# Patient Record
Sex: FEMALE | Race: BLACK OR AFRICAN AMERICAN | NOT HISPANIC OR LATINO | Employment: UNEMPLOYED | URBAN - METROPOLITAN AREA
[De-identification: names, ages, dates, MRNs, and addresses within clinical notes are randomized per-mention and may not be internally consistent; named-entity substitution may affect disease eponyms.]

---

## 2017-04-29 ENCOUNTER — HOSPITAL ENCOUNTER (EMERGENCY)
Facility: HOSPITAL | Age: 47
Discharge: HOME | End: 2017-04-29
Attending: EMERGENCY MEDICINE
Payer: MEDICAID

## 2017-04-29 VITALS
HEART RATE: 84 BPM | OXYGEN SATURATION: 97 % | DIASTOLIC BLOOD PRESSURE: 77 MMHG | RESPIRATION RATE: 18 BRPM | WEIGHT: 246 LBS | SYSTOLIC BLOOD PRESSURE: 133 MMHG | HEIGHT: 68 IN | TEMPERATURE: 99 F | BODY MASS INDEX: 37.28 KG/M2

## 2017-04-29 DIAGNOSIS — R03.0 ELEVATED BLOOD PRESSURE READING: ICD-10-CM

## 2017-04-29 DIAGNOSIS — L03.011 PARONYCHIA OF RIGHT RING FINGER: Primary | ICD-10-CM

## 2017-04-29 PROCEDURE — 99283 EMERGENCY DEPT VISIT LOW MDM: CPT | Mod: 25

## 2017-04-29 PROCEDURE — 10060 I&D ABSCESS SIMPLE/SINGLE: CPT

## 2017-04-29 PROCEDURE — 25000003 PHARM REV CODE 250: Performed by: EMERGENCY MEDICINE

## 2017-04-29 RX ORDER — HYDROCODONE BITARTRATE AND ACETAMINOPHEN 7.5; 325 MG/1; MG/1
1 TABLET ORAL EVERY 6 HOURS PRN
Qty: 11 TABLET | Refills: 0 | Status: ON HOLD | OUTPATIENT
Start: 2017-04-29 | End: 2018-09-05 | Stop reason: HOSPADM

## 2017-04-29 RX ORDER — CLINDAMYCIN HYDROCHLORIDE 150 MG/1
300 CAPSULE ORAL
Status: COMPLETED | OUTPATIENT
Start: 2017-04-29 | End: 2017-04-29

## 2017-04-29 RX ORDER — CLINDAMYCIN HYDROCHLORIDE 300 MG/1
300 CAPSULE ORAL EVERY 8 HOURS
Qty: 30 CAPSULE | Refills: 0 | Status: ON HOLD | OUTPATIENT
Start: 2017-04-29 | End: 2018-09-05 | Stop reason: HOSPADM

## 2017-04-29 RX ADMIN — CLINDAMYCIN HYDROCHLORIDE 300 MG: 150 CAPSULE ORAL at 11:04

## 2017-04-29 NOTE — ED AVS SNAPSHOT
[facility]  [address] [address] [address]               Marcie Geiger   2017 10:04 PM   ED    Description:  Female : 1970   Department:  [facility]           Your Care was Coordinated By:     Provider Role From To    Alivia Miller DO Attending Provider 17 [phone] --      Reason for Visit     Hand Pain           Diagnoses this Visit        Comments    Paronychia of right ring finger    -  Primary     Elevated blood pressure reading           ED Disposition     None           To Do List           Follow-up Information     Follow up with Rafael Santana MD. Schedule an appointment as soon as possible for a visit in 2 days.    Specialty:  Family Medicine    Why:  Warm soaks of the finger.  Keep elevated.  Return to emergency department for increasing swelling, drainage, pain, or other concerns.    Contact information:    [address] [facility] [address] [facility] [address]  [phone]          Please follow up.    Why:  Pelvic primary care physician for screening for high blood pressure.       These Medications        Disp Refills Start End    clindamycin (CLEOCIN) 300 MG capsule 30 capsule 0 2017     Take 1 capsule (300 mg total) by mouth every 8 (eight) hours. - Oral    hydrocodone-acetaminophen 7.5-325mg (NORCO) 7.5-325 mg per tablet 11 tablet 0 2017     Take 1 tablet by mouth every 6 (six) hours as needed for Pain. - Oral      [facility]     [facility] - 24/7 Assistance  Unless otherwise directed by your provider, please contact [facility], our nurse care line that is available for 24/7 assistance.     Registered nurses in the [facility] provide: appointment scheduling, clinical advisement, health education, and other advisory services.  Call: [phone] (toll free)               Medications           Message regarding Medications     Verify  "the changes and/or additions to your medication regime listed below are the same as discussed with your clinician today.  If any of these changes or additions are incorrect, please notify your healthcare provider.        START taking these NEW medications        Refills    clindamycin (CLEOCIN) 300 MG capsule 0    Sig: Take 1 capsule (300 mg total) by mouth every 8 (eight) hours.    Class: Print    Route: Oral    hydrocodone-acetaminophen 7.5-325mg (NORCO) 7.5-325 mg per tablet 0    Sig: Take 1 tablet by mouth every 6 (six) hours as needed for Pain.    Class: Print    Route: Oral      These medications were administered today        Dose Freq    clindamycin capsule 300 mg 300 mg ED 1 Time    Sig: Take 2 capsules (300 mg total) by mouth ED 1 Time.    Class: Normal    Route: Oral           Verify that the below list of medications is an accurate representation of the medications you are currently taking.  If none reported, the list may be blank. If incorrect, please contact your healthcare provider. Carry this list with you in case of emergency.           Current Medications     clindamycin (CLEOCIN) 300 MG capsule Take 1 capsule (300 mg total) by mouth every 8 (eight) hours.    clindamycin capsule 300 mg Take 2 capsules (300 mg total) by mouth ED 1 Time.    hydrocodone-acetaminophen 7.5-325mg (NORCO) 7.5-325 mg per tablet Take 1 tablet by mouth every 6 (six) hours as needed for Pain.           Clinical Reference Information           Your Vitals Were     BP Pulse Temp Resp Height Weight    133/77 (BP Location: Left arm, Patient Position: Sitting) 84 98.8 °F (37.1 °C) (Oral) 18 5' 8" (1.727 m) 111.6 kg (246 lb)    SpO2 BMI             97% 37.4 kg/m2         Allergies as of 4/29/2017     No Known Allergies      Immunizations Administered on Date of Encounter - 4/29/2017     None      ED Micro, Lab, POCT     None      ED Imaging Orders     None      Discharge References/Attachments     PARONYCHIA OF THE FINGER OR TOE " (ENGLISH)    ACETAMINOPHEN; HYDROCODONE TABLETS OR CAPSULES (ENGLISH)    CLINDAMYCIN CAPSULES (ENGLISH)    HYPERTENSION, TO BE CONFIRMED (ENGLISH)      [facility] Sign-Up     Activating your [facility] account is as easy as 1-2-3!     1) Visit [email or web address], select Sign Up Now, enter this activation code and your date of birth, then select Next.  [email or web address]  Expires: 6/13/2017 10:04 PM      2) Create a username and password to use when you visit [facility] in the future and select a security question in case you lose your password and select Next.    3) Enter your e-mail address and click Sign Up!    Additional Information  If you have questions, please e-mail [email or web address] or call [phone] to talk to our [facility] staff. Remember, [facility] is NOT to be used for urgent needs. For medical emergencies, dial 911.         Smoking Cessation     If you would like to quit smoking:   You may be eligible for free services if you are a [place] resident and started smoking cigarettes before September 1, 1988.  Call the Smoking Cessation Trust (SCT) toll free at (960) 956-1067 or (642) 346-5391.   Call 1-800-QUIT-NOW if you do not meet the above criteria.   Contact us via email: [email or web address]   View our website for more information: [email or web address]         [facility] complies with applicable Federal civil rights laws and does not discriminate on the basis of race, color, national origin, age, disability, or sex.        Language Assistance Services     ATTENTION: Language assistance services are available, free of charge. Please call [phone].      ATENCIÓN: Si habla español, tiene a blount disposición servicios gratuitos de asistencia lingüística. Llame al [phone].     CHÚ Ý: N?u b?n nói Ti?ng Vi?t, có các d?ch v? h? tr? ngôn ng? mi?n phí dành cho b?n. G?i s? [phone].

## 2017-04-30 NOTE — ED PROVIDER NOTES
Encounter Date: 4/29/2017       History     Chief Complaint   Patient presents with    Hand Pain     distal 4th digit on R hand swelling and inflammation. onset approx Monday. Denies any drainage.      Review of patient's allergies indicates:  No Known Allergies  HPI Comments: CHIEF COMPLIANT: Hand Pain (distal 4th digit on R hand swelling and inflammation. onset approx Monday. Denies any drainage. )      4/29/2017, 10:41 PM     The history is provided by the patient. Marcie Geiger is a 46 y.o. female presenting to the ED for right ring finger pain.  Onset was 6 days ago.  Patient believes she might have picked a hangnail, however she is not sure.  She does there is been worsening swelling to the right ring finger.  She notes that it is throbbing in nature.  It is worse when she touches it.  Pain is rated as moderate.  There is no radiation. Patient denies any.  Nails were pressed on nails.  Patient denies any temperature, fever, nausea or vomiting.     PCP: Rafael Santana MD  Specialist:       The history is provided by the patient.     History reviewed. No pertinent past medical history.  History reviewed. No pertinent surgical history.  History reviewed. No pertinent family history.  Social History   Substance Use Topics    Smoking status: Former Smoker     Types: Cigarettes     Quit date: 2000    Smokeless tobacco: None    Alcohol use Yes      Comment: occasionally      Review of Systems   Constitutional: Negative for fever.   HENT: Negative for sore throat.    Respiratory: Negative for shortness of breath.    Cardiovascular: Negative for chest pain.   Gastrointestinal: Negative for nausea.   Genitourinary: Negative for dysuria.   Musculoskeletal: Negative for back pain.        Right forth digit pain   Skin: Negative for rash.   Neurological: Negative for weakness.   Hematological: Does not bruise/bleed easily.       Physical Exam   Initial Vitals   BP Pulse Resp Temp SpO2   04/29/17 2201  "04/29/17 2201 04/29/17 2201 04/29/17 2201 04/29/17 2201   133/77 84 18 98.8 °F (37.1 °C) 97 %     Vitals:    04/29/17 2201   BP: 133/77   Pulse: 84   Resp: 18   Temp: 98.8 °F (37.1 °C)   TempSrc: Oral   SpO2: 97%   Weight: 111.6 kg (246 lb)   Height: 5' 8" (1.727 m)       Physical Exam    Constitutional: She appears well-developed and well-nourished.   HENT:   Head: Normocephalic and atraumatic.   Eyes: Conjunctivae and EOM are normal. Pupils are equal, round, and reactive to light.   Neck: Normal range of motion.   Cardiovascular: Normal rate, regular rhythm and normal heart sounds.   Pulmonary/Chest: Breath sounds normal. No respiratory distress.   Abdominal: Soft. Bowel sounds are normal. She exhibits no mass. There is no rebound and no guarding.   Musculoskeletal: Normal range of motion.        Right hand: Right ring finger: Exhibits tenderness (There is fluctuance and tenderness along the right lateral nail fold ulnar side.).   Neurological: She is alert and oriented to person, place, and time. She has normal strength. No cranial nerve deficit.   Cranial nerves II-XII intact   Skin: Skin is warm and dry.   Psychiatric: She has a normal mood and affect. Her speech is normal and behavior is normal. Thought content normal.             ED Course   I & D - Incision and Drainage  Date/Time: 4/29/2017 11:11 PM  Performed by: NABILA WILSON  Authorized by: NABILA WILSON   Type: abscess (Drainage of paronychia)  Body area: upper extremity  Location details: right ring finger  Scalpel size: 11  Drainage: pus  Drainage amount: moderate  Wound treatment: incision (Nailfold gently lifted with #11 blade)  Complications: No  Estimated blood loss (mL): 0  Specimens: No  Comments: Area was cleaned with ChloraPrep.  No full.  Lifted using #11 blade.  Area bandaged         Labs Reviewed - No data to display                            ED Course     Medications   clindamycin capsule 300 mg (300 mg Oral Given 4/29/17 0749) "       23:40  AM Reassessment: Dr. Miller reassessed the pt.  The pt is resting comfortably and is NAD.  Pt states their sx have improved. Discussed test results, shared treatment plan, specific conditions for return, and the need for f/u.  Answered their questions at this time.  Pt understands and agrees to the plan.  The pt has remained hemodynamically stable through ED course and is stable for discharge.     Follow-up Information     Follow up with Rafael Santana MD. Schedule an appointment as soon as possible for a visit in 2 days.    Specialty:  Family Medicine    Why:  Warm soaks of the finger.  Keep elevated.  Return to emergency department for increasing swelling, drainage, pain, or other concerns.    Contact information:    [address] [address] [facility]  [place] [address]  [phone]          Please follow up.    Why:  Pelvic primary care physician for screening for high blood pressure.            Discharge Medication List as of 4/29/2017 11:14 PM      START taking these medications    Details   clindamycin (CLEOCIN) 300 MG capsule Take 1 capsule (300 mg total) by mouth every 8 (eight) hours., Starting 4/29/2017, Until Discontinued, Print      hydrocodone-acetaminophen 7.5-325mg (NORCO) 7.5-325 mg per tablet Take 1 tablet by mouth every 6 (six) hours as needed for Pain., Starting 4/29/2017, Until Discontinued, Print              Discharge Medication List as of 4/29/2017 11:14 PM        I discussed wound care precautions with patient and/or family/caretaker; specifically that all wounds have risk of infection despite efforts to cleanse and debride the wound; and there is a risk of an occult foreign body (and thus increased risk of infection) despite a negative examination.  I discussed with patient need to return for any signs of infection, specifically redness, increased pain, fever, drainage of pus, or any concern, immediately.      Pre-hypertension/Hypertension: The pt has been  informed that they may have pre-hypertension or hypertension based on a blood pressure reading in the ED. I recommend that the pt call the PCP listed on their discharge instructions or a physician of their choice this week to arrange f/u for further evaluation of possible pre-hypertension or hypertension.     Clinical Impression:       ICD-10-CM ICD-9-CM   1. Paronychia of right ring finger L03.011 681.02   2. Elevated blood pressure reading R03.0 796.2         Disposition:   Disposition: Discharged  Condition: Stable       Alivia Miller, DO  04/30/17 7388

## 2017-08-28 ENCOUNTER — HOSPITAL ENCOUNTER (OUTPATIENT)
Dept: RADIOLOGY | Facility: HOSPITAL | Age: 47
Discharge: HOME | End: 2017-08-28
Attending: NURSE PRACTITIONER
Payer: MEDICAID

## 2017-08-28 DIAGNOSIS — Z92.89 HISTORY OF SCREENING MAMMOGRAPHY: Primary | ICD-10-CM

## 2017-08-28 DIAGNOSIS — Z92.89 HISTORY OF SCREENING MAMMOGRAPHY: ICD-10-CM

## 2017-08-28 PROCEDURE — 77067 SCR MAMMO BI INCL CAD: CPT | Mod: 26,,, | Performed by: RADIOLOGY

## 2017-08-28 PROCEDURE — 77063 BREAST TOMOSYNTHESIS BI: CPT | Mod: 26,,, | Performed by: RADIOLOGY

## 2017-08-28 PROCEDURE — 77067 SCR MAMMO BI INCL CAD: CPT | Mod: TC

## 2017-12-22 LAB — HPV16+18+H RISK 12 DNA CVX-IMP: NEGATIVE

## 2018-05-05 ENCOUNTER — HOSPITAL ENCOUNTER (EMERGENCY)
Facility: HOSPITAL | Age: 48
Discharge: HOME | End: 2018-05-05
Attending: EMERGENCY MEDICINE
Payer: MEDICAID

## 2018-05-05 VITALS
RESPIRATION RATE: 20 BRPM | DIASTOLIC BLOOD PRESSURE: 80 MMHG | HEIGHT: 68 IN | OXYGEN SATURATION: 98 % | SYSTOLIC BLOOD PRESSURE: 140 MMHG | TEMPERATURE: 99 F | WEIGHT: 258.13 LBS | HEART RATE: 94 BPM | BODY MASS INDEX: 39.12 KG/M2

## 2018-05-05 DIAGNOSIS — L02.411 ABSCESS OF RIGHT AXILLA: Primary | ICD-10-CM

## 2018-05-05 PROCEDURE — 99283 EMERGENCY DEPT VISIT LOW MDM: CPT | Mod: 25

## 2018-05-05 PROCEDURE — 10060 I&D ABSCESS SIMPLE/SINGLE: CPT | Mod: RT

## 2018-05-05 PROCEDURE — 25000003 PHARM REV CODE 250: Performed by: EMERGENCY MEDICINE

## 2018-05-05 RX ORDER — SULFAMETHOXAZOLE AND TRIMETHOPRIM 800; 160 MG/1; MG/1
2 TABLET ORAL 2 TIMES DAILY
Qty: 28 TABLET | Refills: 0 | Status: SHIPPED | OUTPATIENT
Start: 2018-05-05 | End: 2018-05-12

## 2018-05-05 RX ORDER — LIDOCAINE HYDROCHLORIDE 10 MG/ML
5 INJECTION, SOLUTION EPIDURAL; INFILTRATION; INTRACAUDAL; PERINEURAL
Status: COMPLETED | OUTPATIENT
Start: 2018-05-05 | End: 2018-05-05

## 2018-05-05 RX ORDER — ESOMEPRAZOLE MAGNESIUM 40 MG/1
40 CAPSULE, DELAYED RELEASE ORAL
COMMUNITY
End: 2020-02-14 | Stop reason: SDUPTHER

## 2018-05-05 RX ADMIN — LIDOCAINE HYDROCHLORIDE 50 MG: 10 INJECTION, SOLUTION EPIDURAL; INFILTRATION; INTRACAUDAL; PERINEURAL at 11:05

## 2018-05-05 NOTE — ED PROVIDER NOTES
Encounter Date: 5/5/2018       History     Chief Complaint   Patient presents with    Abscess     right axilla x 1 week, denies drainage     The history is provided by the patient.   Abscess    This is a new problem. The current episode started several days ago. The problem occurs rarely. The problem has been unchanged. The abscess is present on the torso. The abscess is characterized by swelling. Pertinent negatives include no fever and no sore throat. She has received no recent medical care.     Review of patient's allergies indicates:  No Known Allergies  History reviewed. No pertinent past medical history.  History reviewed. No pertinent surgical history.  History reviewed. No pertinent family history.  Social History   Substance Use Topics    Smoking status: Former Smoker     Types: Cigarettes     Quit date: 2000    Smokeless tobacco: Not on file    Alcohol use Yes      Comment: occasionally      Review of Systems   Constitutional: Negative for fever.   HENT: Negative for sore throat.    Respiratory: Negative for shortness of breath.    Cardiovascular: Negative for chest pain.   Gastrointestinal: Negative for nausea.   Genitourinary: Negative for dysuria.   Musculoskeletal: Negative for back pain.   Skin: Negative for rash.   Neurological: Negative for weakness.   Hematological: Does not bruise/bleed easily.       Physical Exam     Initial Vitals [05/05/18 1100]   BP Pulse Resp Temp SpO2   (!) 140/80 94 20 98.8 °F (37.1 °C) 98 %      MAP       100         Physical Exam    Nursing note and vitals reviewed.  Constitutional: She appears well-developed and well-nourished. No distress.   HENT:   Head: Normocephalic and atraumatic.   Mouth/Throat: Oropharynx is clear and moist.   Eyes: Conjunctivae and EOM are normal. Pupils are equal, round, and reactive to light.   Neck: Normal range of motion. Neck supple.   Cardiovascular: Normal rate, regular rhythm and normal heart sounds. Exam reveals no gallop and no  friction rub.    No murmur heard.  Pulmonary/Chest: Breath sounds normal. No respiratory distress. She has no wheezes. She has no rhonchi. She has no rales.   Abdominal: Soft. Bowel sounds are normal. She exhibits no distension and no mass. There is no tenderness. There is no rebound and no guarding.   Musculoskeletal: Normal range of motion. She exhibits no edema or tenderness.   Neurological: She is alert and oriented to person, place, and time. She has normal strength.   Skin: Skin is warm and dry. No rash noted.        Psychiatric: She has a normal mood and affect. Thought content normal.         ED Course   I & D - Incision and Drainage  Date/Time: 5/5/2018 11:20 AM  Performed by: HILARIO MOON  Authorized by: HILARIO MOON   Consent Done: Yes  Consent: Verbal consent obtained.  Type: abscess  Body area: trunk (Right axilla)  Anesthesia: local infiltration    Anesthesia:  Local Anesthetic: lidocaine 1% without epinephrine  Scalpel size: 11  Incision type: single straight  Complexity: simple  Drainage: purulent  Drainage amount: moderate  Wound treatment: incision,  drainage and  wound packed  Packing material: 1/4 in gauze  Patient tolerance: Patient tolerated the procedure well with no immediate complications        Labs Reviewed - No data to display                               Clinical Impression:       ICD-10-CM ICD-9-CM   1. Abscess of right axilla L02.411 682.3         Disposition:   Disposition: Discharged  Condition: Stable                        Hilario Moon MD  05/05/18 1200

## 2018-08-29 ENCOUNTER — HOSPITAL ENCOUNTER (OUTPATIENT)
Dept: RADIOLOGY | Facility: HOSPITAL | Age: 48
Discharge: HOME | End: 2018-08-29
Attending: FAMILY MEDICINE
Payer: MEDICAID

## 2018-08-29 ENCOUNTER — HOSPITAL ENCOUNTER (OUTPATIENT)
Dept: RADIOLOGY | Facility: HOSPITAL | Age: 48
Discharge: HOME | End: 2018-08-29
Attending: NURSE PRACTITIONER
Payer: MEDICAID

## 2018-08-29 VITALS — BODY MASS INDEX: 39.1 KG/M2 | WEIGHT: 258 LBS | HEIGHT: 68 IN

## 2018-08-29 DIAGNOSIS — R60.0 LOCALIZED EDEMA: Primary | ICD-10-CM

## 2018-08-29 DIAGNOSIS — Z12.31 ENCOUNTER FOR SCREENING MAMMOGRAM FOR MALIGNANT NEOPLASM OF BREAST: ICD-10-CM

## 2018-08-29 DIAGNOSIS — R60.0 LOCALIZED EDEMA: ICD-10-CM

## 2018-08-29 PROCEDURE — 93970 EXTREMITY STUDY: CPT | Mod: 26,,, | Performed by: RADIOLOGY

## 2018-08-29 PROCEDURE — 77067 SCR MAMMO BI INCL CAD: CPT | Mod: 26,,, | Performed by: RADIOLOGY

## 2018-08-29 PROCEDURE — 77067 SCR MAMMO BI INCL CAD: CPT | Mod: TC,PO

## 2018-08-29 PROCEDURE — 93970 EXTREMITY STUDY: CPT | Mod: TC,PO

## 2018-09-04 ENCOUNTER — HOSPITAL ENCOUNTER (OUTPATIENT)
Facility: HOSPITAL | Age: 48
Discharge: HOME | End: 2018-09-05
Attending: EMERGENCY MEDICINE | Admitting: INTERNAL MEDICINE
Payer: MEDICAID

## 2018-09-04 DIAGNOSIS — E87.0 HYPERNATREMIA: ICD-10-CM

## 2018-09-04 DIAGNOSIS — R11.2 NAUSEA AND VOMITING, INTRACTABILITY OF VOMITING NOT SPECIFIED, UNSPECIFIED VOMITING TYPE: ICD-10-CM

## 2018-09-04 DIAGNOSIS — N28.9 RENAL INSUFFICIENCY: ICD-10-CM

## 2018-09-04 DIAGNOSIS — R73.9 HYPERGLYCEMIA: Primary | ICD-10-CM

## 2018-09-04 PROBLEM — J06.9 UPPER RESPIRATORY INFECTION: Status: ACTIVE | Noted: 2018-09-04

## 2018-09-04 PROBLEM — E11.65 TYPE 2 DIABETES MELLITUS WITH HYPERGLYCEMIA: Status: ACTIVE | Noted: 2018-09-04

## 2018-09-04 LAB
ALBUMIN SERPL BCP-MCNC: 3.4 G/DL
ALBUMIN SERPL BCP-MCNC: 3.7 G/DL
ALP SERPL-CCNC: 148 U/L
ALT SERPL W/O P-5'-P-CCNC: 76 U/L
AMPHET+METHAMPHET UR QL: NEGATIVE
AMYLASE SERPL-CCNC: 43 U/L
ANION GAP SERPL CALC-SCNC: 12 MMOL/L
ANION GAP SERPL CALC-SCNC: 14 MMOL/L
ANION GAP SERPL CALC-SCNC: 15 MMOL/L
ANION GAP SERPL CALC-SCNC: 16 MMOL/L
ANION GAP SERPL CALC-SCNC: 16 MMOL/L
AST SERPL-CCNC: 35 U/L
BACTERIA #/AREA URNS AUTO: NORMAL /HPF
BARBITURATES UR QL SCN>200 NG/ML: NEGATIVE
BASOPHILS # BLD AUTO: 0.03 K/UL
BASOPHILS # BLD AUTO: 0.03 K/UL
BASOPHILS NFR BLD: 0.2 %
BASOPHILS NFR BLD: 0.3 %
BENZODIAZ UR QL SCN>200 NG/ML: NEGATIVE
BILIRUB SERPL-MCNC: 0.6 MG/DL
BILIRUB UR QL STRIP: NEGATIVE
BUN SERPL-MCNC: 10 MG/DL
BUN SERPL-MCNC: 8 MG/DL
BUN SERPL-MCNC: 9 MG/DL
BZE UR QL SCN: NEGATIVE
CALCIUM SERPL-MCNC: 9.1 MG/DL
CALCIUM SERPL-MCNC: 9.2 MG/DL
CALCIUM SERPL-MCNC: 9.7 MG/DL
CANNABINOIDS UR QL SCN: NEGATIVE
CHLORIDE SERPL-SCNC: 108 MMOL/L
CHLORIDE SERPL-SCNC: 108 MMOL/L
CHLORIDE SERPL-SCNC: 109 MMOL/L
CHLORIDE SERPL-SCNC: 111 MMOL/L
CHLORIDE SERPL-SCNC: 98 MMOL/L
CLARITY UR REFRACT.AUTO: ABNORMAL
CO2 SERPL-SCNC: 19 MMOL/L
CO2 SERPL-SCNC: 19 MMOL/L
CO2 SERPL-SCNC: 21 MMOL/L
CO2 SERPL-SCNC: 21 MMOL/L
CO2 SERPL-SCNC: 22 MMOL/L
COLOR UR AUTO: YELLOW
CREAT SERPL-MCNC: 1.1 MG/DL
CREAT SERPL-MCNC: 1.3 MG/DL
CREAT SERPL-MCNC: 1.7 MG/DL
CREAT UR-MCNC: 88.8 MG/DL
DEPRECATED S PYO AG THROAT QL EIA: NEGATIVE
DIFFERENTIAL METHOD: ABNORMAL
DIFFERENTIAL METHOD: ABNORMAL
EOSINOPHIL # BLD AUTO: 0 K/UL
EOSINOPHIL # BLD AUTO: 0 K/UL
EOSINOPHIL NFR BLD: 0.2 %
EOSINOPHIL NFR BLD: 0.3 %
ERYTHROCYTE [DISTWIDTH] IN BLOOD BY AUTOMATED COUNT: 14 %
ERYTHROCYTE [DISTWIDTH] IN BLOOD BY AUTOMATED COUNT: 14.1 %
EST. GFR  (AFRICAN AMERICAN): 40.8 ML/MIN/1.73 M^2
EST. GFR  (AFRICAN AMERICAN): 56.5 ML/MIN/1.73 M^2
EST. GFR  (AFRICAN AMERICAN): >60 ML/MIN/1.73 M^2
EST. GFR  (NON AFRICAN AMERICAN): 35.4 ML/MIN/1.73 M^2
EST. GFR  (NON AFRICAN AMERICAN): 49 ML/MIN/1.73 M^2
EST. GFR  (NON AFRICAN AMERICAN): 59.9 ML/MIN/1.73 M^2
EST. GFR  (NON AFRICAN AMERICAN): 60 ML/MIN/1.73 M^2
EST. GFR  (NON AFRICAN AMERICAN): 60 ML/MIN/1.73 M^2
GIANT PLATELETS BLD QL SMEAR: PRESENT
GLUCOSE SERPL-MCNC: 400 MG/DL
GLUCOSE SERPL-MCNC: 407 MG/DL
GLUCOSE SERPL-MCNC: 412 MG/DL
GLUCOSE SERPL-MCNC: 617 MG/DL
GLUCOSE SERPL-MCNC: 978 MG/DL
GLUCOSE UR QL STRIP: ABNORMAL
HCT VFR BLD AUTO: 42.9 %
HCT VFR BLD AUTO: 43.7 %
HGB BLD-MCNC: 14.5 G/DL
HGB BLD-MCNC: 15 G/DL
HGB UR QL STRIP: ABNORMAL
KETONES UR QL STRIP: NEGATIVE
LEUKOCYTE ESTERASE UR QL STRIP: NEGATIVE
LIPASE SERPL-CCNC: 18 U/L
LYMPHOCYTES # BLD AUTO: 2.1 K/UL
LYMPHOCYTES # BLD AUTO: 3.5 K/UL
LYMPHOCYTES NFR BLD: 17.9 %
LYMPHOCYTES NFR BLD: 25 %
MAGNESIUM SERPL-MCNC: 2.2 MG/DL
MCH RBC QN AUTO: 26.5 PG
MCH RBC QN AUTO: 27.1 PG
MCHC RBC AUTO-ENTMCNC: 33.8 G/DL
MCHC RBC AUTO-ENTMCNC: 34.3 G/DL
MCV RBC AUTO: 77 FL
MCV RBC AUTO: 80 FL
METHADONE UR QL SCN>300 NG/ML: NEGATIVE
MICROSCOPIC COMMENT: NORMAL
MONOCYTES # BLD AUTO: 0.8 K/UL
MONOCYTES # BLD AUTO: 0.8 K/UL
MONOCYTES NFR BLD: 5.6 %
MONOCYTES NFR BLD: 6.3 %
NEUTROPHILS # BLD AUTO: 9 K/UL
NEUTROPHILS # BLD AUTO: 9.7 K/UL
NEUTROPHILS NFR BLD: 69.2 %
NEUTROPHILS NFR BLD: 75 %
NITRITE UR QL STRIP: NEGATIVE
OPIATES UR QL SCN: NEGATIVE
PCP UR QL SCN>25 NG/ML: NEGATIVE
PH UR STRIP: 6 [PH] (ref 5–8)
PHOSPHATE SERPL-MCNC: 3.1 MG/DL
PHOSPHATE SERPL-MCNC: 3.1 MG/DL
PLATELET # BLD AUTO: 262 K/UL
PLATELET # BLD AUTO: 326 K/UL
PLATELET BLD QL SMEAR: ABNORMAL
PMV BLD AUTO: 11.6 FL
PMV BLD AUTO: 12.6 FL
POCT GLUCOSE: 330 MG/DL (ref 70–110)
POCT GLUCOSE: 386 MG/DL (ref 70–110)
POCT GLUCOSE: >500 MG/DL (ref 70–110)
POTASSIUM SERPL-SCNC: 4.2 MMOL/L
POTASSIUM SERPL-SCNC: 4.3 MMOL/L
POTASSIUM SERPL-SCNC: 4.4 MMOL/L
PROT SERPL-MCNC: 8.8 G/DL
PROT UR QL STRIP: NEGATIVE
RBC # BLD AUTO: 5.35 M/UL
RBC # BLD AUTO: 5.66 M/UL
RBC #/AREA URNS AUTO: 3 /HPF (ref 0–4)
SODIUM SERPL-SCNC: 135 MMOL/L
SODIUM SERPL-SCNC: 142 MMOL/L
SODIUM SERPL-SCNC: 143 MMOL/L
SODIUM SERPL-SCNC: 144 MMOL/L
SODIUM SERPL-SCNC: 145 MMOL/L
SP GR UR STRIP: <=1.005 (ref 1–1.03)
SQUAMOUS #/AREA URNS AUTO: 6 /HPF
TOXICOLOGY INFORMATION: NORMAL
TSH SERPL DL<=0.005 MIU/L-ACNC: 0.67 UIU/ML
URN SPEC COLLECT METH UR: ABNORMAL
UROBILINOGEN UR STRIP-ACNC: NEGATIVE EU/DL
WBC # BLD AUTO: 11.93 K/UL
WBC # BLD AUTO: 14.02 K/UL
YEAST UR QL AUTO: NORMAL

## 2018-09-04 PROCEDURE — 80053 COMPREHEN METABOLIC PANEL: CPT

## 2018-09-04 PROCEDURE — 96374 THER/PROPH/DIAG INJ IV PUSH: CPT

## 2018-09-04 PROCEDURE — 96361 HYDRATE IV INFUSION ADD-ON: CPT

## 2018-09-04 PROCEDURE — 82962 GLUCOSE BLOOD TEST: CPT

## 2018-09-04 PROCEDURE — 96372 THER/PROPH/DIAG INJ SC/IM: CPT | Mod: 59

## 2018-09-04 PROCEDURE — 85025 COMPLETE CBC W/AUTO DIFF WBC: CPT | Mod: 91

## 2018-09-04 PROCEDURE — 25000003 PHARM REV CODE 250: Performed by: EMERGENCY MEDICINE

## 2018-09-04 PROCEDURE — 96375 TX/PRO/DX INJ NEW DRUG ADDON: CPT

## 2018-09-04 PROCEDURE — 63600175 PHARM REV CODE 636 W HCPCS: Performed by: HOSPITALIST

## 2018-09-04 PROCEDURE — 87880 STREP A ASSAY W/OPTIC: CPT

## 2018-09-04 PROCEDURE — 85025 COMPLETE CBC W/AUTO DIFF WBC: CPT

## 2018-09-04 PROCEDURE — 96376 TX/PRO/DX INJ SAME DRUG ADON: CPT

## 2018-09-04 PROCEDURE — 87081 CULTURE SCREEN ONLY: CPT

## 2018-09-04 PROCEDURE — 83036 HEMOGLOBIN GLYCOSYLATED A1C: CPT

## 2018-09-04 PROCEDURE — 83735 ASSAY OF MAGNESIUM: CPT

## 2018-09-04 PROCEDURE — 84443 ASSAY THYROID STIM HORMONE: CPT

## 2018-09-04 PROCEDURE — 84100 ASSAY OF PHOSPHORUS: CPT

## 2018-09-04 PROCEDURE — 99284 EMERGENCY DEPT VISIT MOD MDM: CPT | Mod: 25

## 2018-09-04 PROCEDURE — 80048 BASIC METABOLIC PNL TOTAL CA: CPT | Mod: 91

## 2018-09-04 PROCEDURE — 80048 BASIC METABOLIC PNL TOTAL CA: CPT

## 2018-09-04 PROCEDURE — G0378 HOSPITAL OBSERVATION PER HR: HCPCS

## 2018-09-04 PROCEDURE — 83690 ASSAY OF LIPASE: CPT

## 2018-09-04 PROCEDURE — 80069 RENAL FUNCTION PANEL: CPT

## 2018-09-04 PROCEDURE — 25000003 PHARM REV CODE 250: Performed by: HOSPITALIST

## 2018-09-04 PROCEDURE — 36415 COLL VENOUS BLD VENIPUNCTURE: CPT

## 2018-09-04 PROCEDURE — 80307 DRUG TEST PRSMV CHEM ANLYZR: CPT

## 2018-09-04 PROCEDURE — 63600175 PHARM REV CODE 636 W HCPCS: Performed by: EMERGENCY MEDICINE

## 2018-09-04 PROCEDURE — 80061 LIPID PANEL: CPT

## 2018-09-04 PROCEDURE — 81000 URINALYSIS NONAUTO W/SCOPE: CPT | Mod: 59

## 2018-09-04 PROCEDURE — 82150 ASSAY OF AMYLASE: CPT

## 2018-09-04 RX ORDER — SODIUM CHLORIDE 450 MG/100ML
1000 INJECTION, SOLUTION INTRAVENOUS CONTINUOUS
Status: DISCONTINUED | OUTPATIENT
Start: 2018-09-04 | End: 2018-09-04

## 2018-09-04 RX ORDER — SODIUM CHLORIDE 0.9 % (FLUSH) 0.9 %
5 SYRINGE (ML) INJECTION
Status: DISCONTINUED | OUTPATIENT
Start: 2018-09-04 | End: 2018-09-05 | Stop reason: HOSPADM

## 2018-09-04 RX ORDER — GLUCAGON 1 MG
1 KIT INJECTION
Status: DISCONTINUED | OUTPATIENT
Start: 2018-09-04 | End: 2018-09-05 | Stop reason: HOSPADM

## 2018-09-04 RX ORDER — ONDANSETRON 2 MG/ML
4 INJECTION INTRAMUSCULAR; INTRAVENOUS
Status: COMPLETED | OUTPATIENT
Start: 2018-09-04 | End: 2018-09-04

## 2018-09-04 RX ORDER — ONDANSETRON 2 MG/ML
4 INJECTION INTRAMUSCULAR; INTRAVENOUS EVERY 12 HOURS PRN
Status: DISCONTINUED | OUTPATIENT
Start: 2018-09-04 | End: 2018-09-05 | Stop reason: HOSPADM

## 2018-09-04 RX ORDER — IBUPROFEN 200 MG
24 TABLET ORAL
Status: DISCONTINUED | OUTPATIENT
Start: 2018-09-04 | End: 2018-09-05 | Stop reason: HOSPADM

## 2018-09-04 RX ORDER — ENOXAPARIN SODIUM 100 MG/ML
40 INJECTION SUBCUTANEOUS EVERY 24 HOURS
Status: DISCONTINUED | OUTPATIENT
Start: 2018-09-04 | End: 2018-09-05 | Stop reason: HOSPADM

## 2018-09-04 RX ORDER — PROMETHAZINE HYDROCHLORIDE AND DEXTROMETHORPHAN HYDROBROMIDE 6.25; 15 MG/5ML; MG/5ML
5 SYRUP ORAL EVERY 6 HOURS PRN
Qty: 120 ML | Refills: 0 | Status: SHIPPED | OUTPATIENT
Start: 2018-09-04 | End: 2018-09-14

## 2018-09-04 RX ORDER — ACETAMINOPHEN 325 MG/1
650 TABLET ORAL EVERY 8 HOURS PRN
Status: DISCONTINUED | OUTPATIENT
Start: 2018-09-04 | End: 2018-09-05 | Stop reason: HOSPADM

## 2018-09-04 RX ORDER — IBUPROFEN 200 MG
16 TABLET ORAL
Status: DISCONTINUED | OUTPATIENT
Start: 2018-09-04 | End: 2018-09-05 | Stop reason: HOSPADM

## 2018-09-04 RX ORDER — SODIUM CHLORIDE 450 MG/100ML
INJECTION, SOLUTION INTRAVENOUS CONTINUOUS
Status: DISCONTINUED | OUTPATIENT
Start: 2018-09-04 | End: 2018-09-05 | Stop reason: HOSPADM

## 2018-09-04 RX ORDER — INSULIN ASPART 100 [IU]/ML
1-10 INJECTION, SOLUTION INTRAVENOUS; SUBCUTANEOUS EVERY 4 HOURS PRN
Status: DISCONTINUED | OUTPATIENT
Start: 2018-09-04 | End: 2018-09-05 | Stop reason: HOSPADM

## 2018-09-04 RX ADMIN — ONDANSETRON 4 MG: 2 INJECTION, SOLUTION INTRAMUSCULAR; INTRAVENOUS at 08:09

## 2018-09-04 RX ADMIN — SODIUM CHLORIDE 1000 ML: 0.45 INJECTION, SOLUTION INTRAVENOUS at 12:09

## 2018-09-04 RX ADMIN — INSULIN HUMAN 10 UNITS: 100 INJECTION, SOLUTION PARENTERAL at 09:09

## 2018-09-04 RX ADMIN — SODIUM CHLORIDE 1000 ML: 0.9 INJECTION, SOLUTION INTRAVENOUS at 08:09

## 2018-09-04 RX ADMIN — SODIUM CHLORIDE: 0.45 INJECTION, SOLUTION INTRAVENOUS at 10:09

## 2018-09-04 RX ADMIN — INSULIN ASPART 4 UNITS: 100 INJECTION, SOLUTION INTRAVENOUS; SUBCUTANEOUS at 11:09

## 2018-09-04 RX ADMIN — SODIUM CHLORIDE 1000 ML: 0.9 INJECTION, SOLUTION INTRAVENOUS at 09:09

## 2018-09-04 RX ADMIN — ENOXAPARIN SODIUM 40 MG: 40 INJECTION, SOLUTION INTRAVENOUS; SUBCUTANEOUS at 10:09

## 2018-09-04 NOTE — ED NOTES
Pt aware of admit and awaiting bed . Warm blanket provided and pt napping at this time. Denies any needs . Call light within reach.

## 2018-09-04 NOTE — ED NOTES
Aaox3, skin warm and dry, resp unlabored and even. amb with steady gait . Arzola well and without difficulty. C/o sore throat for 1 week states having problems keeping liquids down but states vomited x1 per day only. Pt drinking water in triage well with any problems or vomiting noted.

## 2018-09-04 NOTE — ED PROVIDER NOTES
Encounter Date: 2018       History     Chief Complaint   Patient presents with    Sore Throat     sore throat for 1 week and vomiting 5 days -1 time a day. also c/o dizziness for 3 days    Emesis     Patient is also complaining of dizziness, weakness, and frequent urination.      The history is provided by the patient.   Sore Throat   This is a new problem. The current episode started more than 2 days ago. The problem occurs constantly. The problem has not changed since onset.Pertinent negatives include no chest pain, no abdominal pain, no headaches and no shortness of breath. The symptoms are aggravated by swallowing. Nothing relieves the symptoms. She has tried nothing for the symptoms.   Emesis    This is a new problem. The current episode started several days ago. Episode frequency: Once per day. The problem has been gradually improving. The emesis has an appearance of stomach contents. Associated symptoms include cough. Pertinent negatives include no abdominal pain, no fever and no headaches. Risk factors include ill contacts.     Review of patient's allergies indicates:  No Known Allergies  History reviewed. No pertinent past medical history.  History reviewed. No pertinent surgical history.  History reviewed. No pertinent family history.  Social History     Tobacco Use    Smoking status: Former Smoker     Types: Cigarettes     Last attempt to quit: 2000     Years since quittin.6    Smokeless tobacco: Never Used   Substance Use Topics    Alcohol use: No     Frequency: Never    Drug use: No     Review of Systems   Constitutional: Negative for fever.   HENT: Positive for sore throat.    Respiratory: Positive for cough. Negative for shortness of breath.    Cardiovascular: Negative for chest pain.   Gastrointestinal: Positive for vomiting. Negative for abdominal pain and nausea.   Genitourinary: Positive for frequency. Negative for dysuria and pelvic pain.   Musculoskeletal: Negative for back pain.    Skin: Negative for rash.   Neurological: Negative for weakness and headaches.   Hematological: Does not bruise/bleed easily.       Physical Exam     Initial Vitals [09/04/18 0751]   BP Pulse Resp Temp SpO2   126/86 101 20 97.5 °F (36.4 °C) 98 %      MAP       --         Physical Exam    Nursing note and vitals reviewed.  Constitutional: She appears well-developed and well-nourished. No distress.   HENT:   Head: Normocephalic and atraumatic.   Mouth/Throat: Oropharynx is clear and moist. No oropharyngeal exudate.   Eyes: Conjunctivae and EOM are normal. Pupils are equal, round, and reactive to light.   Neck: Normal range of motion. Neck supple.   Cardiovascular: Normal rate, regular rhythm, normal heart sounds and intact distal pulses. Exam reveals no gallop and no friction rub.    No murmur heard.  Pulmonary/Chest: Breath sounds normal. No respiratory distress. She has no wheezes. She has no rhonchi. She has no rales.   Abdominal: Soft. Bowel sounds are normal. She exhibits no distension and no mass. There is no tenderness. There is no rebound and no guarding.   Musculoskeletal: Normal range of motion. She exhibits no edema or tenderness.   Neurological: She is alert and oriented to person, place, and time. She has normal strength.   Skin: Skin is warm and dry. No rash noted.   Psychiatric: She has a normal mood and affect. Thought content normal.         ED Course   Procedures  Labs Reviewed   CBC W/ AUTO DIFFERENTIAL - Abnormal; Notable for the following components:       Result Value    RBC 5.66 (*)     MCV 77 (*)     MCH 26.5 (*)     Gran # (ANC) 9.0 (*)     Gran% 75.0 (*)     Lymph% 17.9 (*)     All other components within normal limits   COMPREHENSIVE METABOLIC PANEL - Abnormal; Notable for the following components:    Sodium 135 (*)     CO2 21 (*)     Glucose 978 (*)     Creatinine 1.7 (*)     Total Protein 8.8 (*)     Alkaline Phosphatase 148 (*)     ALT 76 (*)     eGFR if  40.8 (*)      "eGFR if non  35.4 (*)     All other components within normal limits    Narrative:      Glucose critical result(s) called and verbal readback obtained from   Scarlet Looney RN in ED. , 09/04/2018 09:02   URINALYSIS, REFLEX TO URINE CULTURE - Abnormal; Notable for the following components:    Appearance, UA Hazy (*)     Specific Gravity, UA <=1.005 (*)     Glucose, UA 3+ (*)     Occult Blood UA 1+ (*)     All other components within normal limits    Narrative:     Preferred Collection Type->Urine, Clean Catch   BASIC METABOLIC PANEL - Abnormal; Notable for the following components:    CO2 21 (*)     Glucose 617 (*)     eGFR if  56.5 (*)     eGFR if non  49.0 (*)     All other components within normal limits    Narrative:      Glucose critical result(s) called and verbal readback obtained from   Dr. Moon. , 09/04/2018 12:30   POCT GLUCOSE - Abnormal; Notable for the following components:    POCT Glucose >500 (*)     All other components within normal limits   THROAT SCREEN, RAPID   CULTURE, STREP A,  THROAT   URINALYSIS MICROSCOPIC    Narrative:     Preferred Collection Type->Urine, Clean Catch   POCT GLUCOSE MONITORING CONTINUOUS          Imaging Results    None       ED Vital Signs:  Vitals:    09/04/18 0751 09/04/18 1132   BP: 126/86 128/75   Pulse: 101 94   Resp: 20 17   Temp: 97.5 °F (36.4 °C) 97.4 °F (36.3 °C)   TempSrc: Oral Oral   SpO2: 98% 100%   Weight: 110.3 kg (243 lb 2.7 oz)    Height: 5' 8" (1.727 m)          Abnormal Lab Results:  Labs Reviewed   CBC W/ AUTO DIFFERENTIAL - Abnormal; Notable for the following components:       Result Value    RBC 5.66 (*)     MCV 77 (*)     MCH 26.5 (*)     Gran # (ANC) 9.0 (*)     Gran% 75.0 (*)     Lymph% 17.9 (*)     All other components within normal limits   COMPREHENSIVE METABOLIC PANEL - Abnormal; Notable for the following components:    Sodium 135 (*)     CO2 21 (*)     Glucose 978 (*)     Creatinine 1.7 (*)     " Total Protein 8.8 (*)     Alkaline Phosphatase 148 (*)     ALT 76 (*)     eGFR if  40.8 (*)     eGFR if non  35.4 (*)     All other components within normal limits    Narrative:      Glucose critical result(s) called and verbal readback obtained from   Scarlet Looney RN in ED. , 09/04/2018 09:02   URINALYSIS, REFLEX TO URINE CULTURE - Abnormal; Notable for the following components:    Appearance, UA Hazy (*)     Specific Gravity, UA <=1.005 (*)     Glucose, UA 3+ (*)     Occult Blood UA 1+ (*)     All other components within normal limits    Narrative:     Preferred Collection Type->Urine, Clean Catch   BASIC METABOLIC PANEL - Abnormal; Notable for the following components:    CO2 21 (*)     Glucose 617 (*)     eGFR if  56.5 (*)     eGFR if non  49.0 (*)     All other components within normal limits    Narrative:      Glucose critical result(s) called and verbal readback obtained from   Dr. Moon. , 09/04/2018 12:30   POCT GLUCOSE - Abnormal; Notable for the following components:    POCT Glucose >500 (*)     All other components within normal limits   THROAT SCREEN, RAPID   CULTURE, STREP A,  THROAT   URINALYSIS MICROSCOPIC    Narrative:     Preferred Collection Type->Urine, Clean Catch   POCT GLUCOSE MONITORING CONTINUOUS          All Lab Results:  Results for orders placed or performed during the hospital encounter of 09/04/18   Rapid strep screen   Result Value Ref Range    Rapid Strep A Screen Negative Negative   CBC auto differential   Result Value Ref Range    WBC 11.93 3.90 - 12.70 K/uL    RBC 5.66 (H) 4.00 - 5.40 M/uL    Hemoglobin 15.0 12.0 - 16.0 g/dL    Hematocrit 43.7 37.0 - 48.5 %    MCV 77 (L) 82 - 98 fL    MCH 26.5 (L) 27.0 - 31.0 pg    MCHC 34.3 32.0 - 36.0 g/dL    RDW 14.1 11.5 - 14.5 %    Platelets 326 150 - 350 K/uL    MPV 12.6 9.2 - 12.9 fL    Gran # (ANC) 9.0 (H) 1.8 - 7.7 K/uL    Lymph # 2.1 1.0 - 4.8 K/uL    Mono # 0.8 0.3 - 1.0  K/uL    Eos # 0.0 0.0 - 0.5 K/uL    Baso # 0.03 0.00 - 0.20 K/uL    Gran% 75.0 (H) 38.0 - 73.0 %    Lymph% 17.9 (L) 18.0 - 48.0 %    Mono% 6.3 4.0 - 15.0 %    Eosinophil% 0.2 0.0 - 8.0 %    Basophil% 0.3 0.0 - 1.9 %    Differential Method Automated    Comprehensive metabolic panel   Result Value Ref Range    Sodium 135 (L) 136 - 145 mmol/L    Potassium 4.2 3.5 - 5.1 mmol/L    Chloride 98 95 - 110 mmol/L    CO2 21 (L) 23 - 29 mmol/L    Glucose 978 (HH) 70 - 110 mg/dL    BUN, Bld 10 6 - 20 mg/dL    Creatinine 1.7 (H) 0.5 - 1.4 mg/dL    Calcium 9.7 8.7 - 10.5 mg/dL    Total Protein 8.8 (H) 6.0 - 8.4 g/dL    Albumin 3.7 3.5 - 5.2 g/dL    Total Bilirubin 0.6 0.1 - 1.0 mg/dL    Alkaline Phosphatase 148 (H) 55 - 135 U/L    AST 35 10 - 40 U/L    ALT 76 (H) 10 - 44 U/L    Anion Gap 16 8 - 16 mmol/L    eGFR if African American 40.8 (A) >60 mL/min/1.73 m^2    eGFR if non  35.4 (A) >60 mL/min/1.73 m^2   Urinalysis, Reflex to Urine Culture Urine, Clean Catch   Result Value Ref Range    Specimen UA Urine, Clean Catch     Color, UA Yellow Yellow, Straw, July    Appearance, UA Hazy (A) Clear    pH, UA 6.0 5.0 - 8.0    Specific Gravity, UA <=1.005 (A) 1.005 - 1.030    Protein, UA Negative Negative    Glucose, UA 3+ (A) Negative    Ketones, UA Negative Negative    Bilirubin (UA) Negative Negative    Occult Blood UA 1+ (A) Negative    Nitrite, UA Negative Negative    Urobilinogen, UA Negative <2.0 EU/dL    Leukocytes, UA Negative Negative   Urinalysis Microscopic   Result Value Ref Range    RBC, UA 3 0 - 4 /hpf    Bacteria, UA Rare None-Occ /hpf    Yeast, UA None None    Squam Epithel, UA 6 /hpf    Microscopic Comment SEE COMMENT    Basic metabolic panel   Result Value Ref Range    Sodium 144 136 - 145 mmol/L    Potassium 4.3 3.5 - 5.1 mmol/L    Chloride 109 95 - 110 mmol/L    CO2 21 (L) 23 - 29 mmol/L    Glucose 617 (HH) 70 - 110 mg/dL    BUN, Bld 9 6 - 20 mg/dL    Creatinine 1.3 0.5 - 1.4 mg/dL    Calcium 9.1 8.7 -  10.5 mg/dL    Anion Gap 14 8 - 16 mmol/L    eGFR if African American 56.5 (A) >60 mL/min/1.73 m^2    eGFR if non African American 49.0 (A) >60 mL/min/1.73 m^2   POCT glucose   Result Value Ref Range    POCT Glucose >500 (H) 70 - 110 mg/dL           Imaging Results:  Imaging Results    None            The Emergency Provider reviewed the vital signs and test results, which are outlined above.    ED Discussions:  12:45 PM: Re-evaluated pt. I have discussed test results, shared treatment plan, and the need for admission with patient and family at bedside. Pt and family express understanding at this time and agree with all information. All questions answered. Pt and family have no further questions or concerns at this time. Pt is ready for admit.    All historical, clinical, radiographic, and laboratory findings were reviewed with the patient/family in detail along with the indications for transport to the facility in [place] in order to receive iv fluids and insulin.  All remaining questions and concerns were addressed at this time and the patient/family communicates understanding and agrees to proceed accordingly.  Similarly, all pertinent details of the encounter were discussed with Dr. Biggs via Tamra MURGUIA who agrees to receive the patient at [facility] for further care as outlined above.  The patient will be transferred by [facility] ambulance services secondary to a need for ongoing IV fluids and cardiac monitoring en route.  Nain Moon MD  12:45 PM                                      Clinical Impression:       ICD-10-CM ICD-9-CM   1. Hyperglycemia R73.9 790.29   2. Nausea and vomiting, intractability of vomiting not specified, unspecified vomiting type R11.2 787.01   3. Renal insufficiency N28.9 593.9   4. Hypernatremia E87.0 276.0         Disposition:   Disposition: Placed in Observation  Condition: Serious                        Nain Moon MD  09/04/18 0901       Nain Moon  MD  09/04/18 1243       Nain Mono MD  09/04/18 6237

## 2018-09-04 NOTE — ED NOTES
Report received from LINDA Viveros. I have assumed care of the pt at this time. Pt is aao4, resp e/u, VSS on cardiac monitor, skin warm and dry, nad. Pt is aware of poc. Bed locked in lowest position, side rails up X2, call bell in reach. Will continue to monitor.

## 2018-09-05 VITALS
WEIGHT: 247.81 LBS | RESPIRATION RATE: 18 BRPM | SYSTOLIC BLOOD PRESSURE: 140 MMHG | OXYGEN SATURATION: 97 % | BODY MASS INDEX: 37.56 KG/M2 | TEMPERATURE: 98 F | HEART RATE: 89 BPM | HEIGHT: 68 IN | DIASTOLIC BLOOD PRESSURE: 82 MMHG

## 2018-09-05 PROBLEM — E11.9 NEW ONSET TYPE 2 DIABETES MELLITUS: Status: ACTIVE | Noted: 2018-09-04

## 2018-09-05 LAB
ALBUMIN SERPL BCP-MCNC: 3.1 G/DL
ANION GAP SERPL CALC-SCNC: 14 MMOL/L
BASOPHILS # BLD AUTO: 0.03 K/UL
BASOPHILS NFR BLD: 0.2 %
BUN SERPL-MCNC: 9 MG/DL
CALCIUM SERPL-MCNC: 8.8 MG/DL
CHLORIDE SERPL-SCNC: 107 MMOL/L
CHOLEST SERPL-MCNC: 185 MG/DL
CHOLEST/HDLC SERPL: 6 {RATIO}
CO2 SERPL-SCNC: 21 MMOL/L
CREAT SERPL-MCNC: 1.1 MG/DL
DIFFERENTIAL METHOD: ABNORMAL
EOSINOPHIL # BLD AUTO: 0.1 K/UL
EOSINOPHIL NFR BLD: 0.4 %
ERYTHROCYTE [DISTWIDTH] IN BLOOD BY AUTOMATED COUNT: 14 %
EST. GFR  (AFRICAN AMERICAN): >60 ML/MIN/1.73 M^2
EST. GFR  (NON AFRICAN AMERICAN): 60 ML/MIN/1.73 M^2
ESTIMATED AVG GLUCOSE: 295 MG/DL
ESTIMATED AVG GLUCOSE: 295 MG/DL
GLUCOSE SERPL-MCNC: 416 MG/DL
HAV IGM SERPL QL IA: NEGATIVE
HBA1C MFR BLD HPLC: 11.9 %
HBA1C MFR BLD HPLC: 11.9 %
HBV CORE IGM SERPL QL IA: NEGATIVE
HBV SURFACE AG SERPL QL IA: NEGATIVE
HCT VFR BLD AUTO: 42.3 %
HCV AB SERPL QL IA: NEGATIVE
HDLC SERPL-MCNC: 31 MG/DL
HDLC SERPL: 16.8 %
HGB BLD-MCNC: 14.2 G/DL
HIV 1+2 AB+HIV1 P24 AG SERPL QL IA: NEGATIVE
LDLC SERPL CALC-MCNC: 116 MG/DL
LYMPHOCYTES # BLD AUTO: 3.5 K/UL
LYMPHOCYTES NFR BLD: 26.3 %
MAGNESIUM SERPL-MCNC: 2.1 MG/DL
MCH RBC QN AUTO: 27.3 PG
MCHC RBC AUTO-ENTMCNC: 33.6 G/DL
MCV RBC AUTO: 81 FL
MONOCYTES # BLD AUTO: 0.7 K/UL
MONOCYTES NFR BLD: 5.4 %
NEUTROPHILS # BLD AUTO: 8.9 K/UL
NEUTROPHILS NFR BLD: 67.7 %
NONHDLC SERPL-MCNC: 154 MG/DL
PHOSPHATE SERPL-MCNC: 2.9 MG/DL
PHOSPHATE SERPL-MCNC: 2.9 MG/DL
PLATELET # BLD AUTO: 273 K/UL
PMV BLD AUTO: 11.6 FL
POCT GLUCOSE: 363 MG/DL (ref 70–110)
POCT GLUCOSE: 380 MG/DL (ref 70–110)
POCT GLUCOSE: 493 MG/DL (ref 70–110)
POTASSIUM SERPL-SCNC: 4 MMOL/L
RBC # BLD AUTO: 5.2 M/UL
SODIUM SERPL-SCNC: 142 MMOL/L
TRIGL SERPL-MCNC: 190 MG/DL
WBC # BLD AUTO: 13.11 K/UL

## 2018-09-05 PROCEDURE — 25000003 PHARM REV CODE 250: Performed by: HOSPITALIST

## 2018-09-05 PROCEDURE — G0378 HOSPITAL OBSERVATION PER HR: HCPCS

## 2018-09-05 PROCEDURE — 80069 RENAL FUNCTION PANEL: CPT

## 2018-09-05 PROCEDURE — 85025 COMPLETE CBC W/AUTO DIFF WBC: CPT

## 2018-09-05 PROCEDURE — 83735 ASSAY OF MAGNESIUM: CPT

## 2018-09-05 PROCEDURE — 63600175 PHARM REV CODE 636 W HCPCS: Performed by: HOSPITALIST

## 2018-09-05 PROCEDURE — 86703 HIV-1/HIV-2 1 RESULT ANTBDY: CPT

## 2018-09-05 PROCEDURE — 63600175 PHARM REV CODE 636 W HCPCS: Performed by: NURSE PRACTITIONER

## 2018-09-05 PROCEDURE — 84100 ASSAY OF PHOSPHORUS: CPT

## 2018-09-05 PROCEDURE — 80074 ACUTE HEPATITIS PANEL: CPT

## 2018-09-05 PROCEDURE — C9113 INJ PANTOPRAZOLE SODIUM, VIA: HCPCS | Performed by: HOSPITALIST

## 2018-09-05 RX ORDER — INSULIN GLARGINE 100 [IU]/ML
20 INJECTION, SOLUTION SUBCUTANEOUS DAILY
Qty: 15 ML | Refills: 0 | Status: SHIPPED | OUTPATIENT
Start: 2018-09-05 | End: 2019-09-19

## 2018-09-05 RX ADMIN — INSULIN DETEMIR 15 UNITS: 100 INJECTION, SOLUTION SUBCUTANEOUS at 09:09

## 2018-09-05 RX ADMIN — INSULIN HUMAN 10 UNITS: 100 INJECTION, SOLUTION PARENTERAL at 12:09

## 2018-09-05 RX ADMIN — DEXTROSE 40 MG: 50 INJECTION, SOLUTION INTRAVENOUS at 09:09

## 2018-09-05 RX ADMIN — INSULIN ASPART 10 UNITS: 100 INJECTION, SOLUTION INTRAVENOUS; SUBCUTANEOUS at 11:09

## 2018-09-05 RX ADMIN — INSULIN ASPART 5 UNITS: 100 INJECTION, SOLUTION INTRAVENOUS; SUBCUTANEOUS at 03:09

## 2018-09-05 NOTE — HOSPITAL COURSE
The pt was placed in observation for hyperglycemia and new onset DM. Pt was placed on NISS and Levemir insulin. Blood sugars improved. Infectious work up negative. Diabetic educator was consulted and provided a glucometer, test strips and lancet. Pt was extensively educated on Diabetic diet, S/S hypo/hyperglycemia and what to do, insulin administration, and logging blood sugars. Pt will be discharged on Levemir 20units daily. She was instructed to bring her log to her PCP appointment in 2-5 days. However, pt left hospital prior to discharge. She did not have her medication prescriptions. She did not have her blood sugar checked prior to leaving. She did not get her discharge instructions. Attempts to call pt multiple times unsuccessful.

## 2018-09-05 NOTE — PROGRESS NOTES
Selina RN went to pt's room and she ws no longer in hospital. Pt left hospital with PIV in place and before getting blood sugar rechecked, obtaining her discharge medications, and receiving her discharge instructions. Attempted to contact pt unsuccessful. Called pt's sister, Wendi Geiger, and requested that she contact pt and have her call [phone] ASAP.

## 2018-09-05 NOTE — ASSESSMENT & PLAN NOTE
- newly diagnosed  - check a1c  - continue POC glucose q4h, SSI PRN  - check lipid panel, a1c, tsh, and uds

## 2018-09-05 NOTE — ASSESSMENT & PLAN NOTE
- possible gastroenteritis vs esophagitis vs pancreatitis vs gastroparesis  - check lipase, hiv panel, acute hep panel  - continue IVF

## 2018-09-05 NOTE — DISCHARGE SUMMARY
[facility] Hospital Medicine  Discharge Summary      Patient Name: Marcie Geiger  MRN: [id number]  Admission Date: 9/4/2018  Hospital Length of Stay: 0 days  Discharge Date and Time:  09/05/2018 4:48 PM  Attending Physician:Dr. Biggs   Discharging Provider: Ella Burton NP  Primary Care Provider: Rafael Santana MD      HPI:   47F no significant PMH presnets with sore throat x 5 days.  Associated with NP cough. The problem occurs constantly. The problem has not changed since onset.Pertinent negatives include no chest pain, no abdominal pain, no headaches and no shortness of breath. The symptoms are aggravated by swallowing. Nothing relieves the symptoms. She has tried nothing for the symptoms. Also c/o nausea and vomiting x 1.  Denies abdominal pain, , no fever and no headaches.   In ER, glucose >900 and corrected Na 149.  Patient given 2L NS bolus and 20 units regular insulin in ER.  Hospital medicine called for observation.           * No surgery found *      Hospital Course:   The pt was placed in observation for hyperglycemia and new onset DM. Pt was placed on NISS and Levemir insulin. Blood sugars improved. Infectious work up negative. Diabetic educator was consulted and provided a glucometer, test strips and lancet. Pt was extensively educated on Diabetic diet, S/S hypo/hyperglycemia and what to do, insulin administration, and logging blood sugars. Pt will be discharged on Levemir 20units daily. She was instructed to bring her log to her PCP appointment in 2-5 days. However, pt left hospital prior to discharge. She did not have her medication prescriptions. She did not have her blood sugar checked prior to leaving. She did not get her discharge instructions. Attempts to call pt multiple times unsuccessful.      Consults:   Consults (From admission, onward)        Status Ordering Provider     Inpatient consult to Diabetes educator  Once     Provider:  (Not yet assigned)     Completed ELLA SLAUGHTER.              Final Active Diagnoses:    Diagnosis Date Noted POA    PRINCIPAL PROBLEM:  New onset type 2 diabetes mellitus [E11.9] 09/04/2018 Yes    Nausea and vomiting [R11.2] 09/04/2018 Yes    Upper respiratory infection [J06.9] 09/04/2018 Yes    Hypernatremia [E87.0] 09/04/2018 Yes      Problems Resolved During this Admission:       Discharged Condition: stable    Disposition: Home or Self Care    Follow Up:  Follow-up Information     Rafael Santana MD.    Specialty:  Family Medicine  Why:  2-5 days  Contact information:  [address] [facility]  [place] [address]  [phone]                 Patient Instructions:      Diet diabetic     Activity as tolerated       Significant Diagnostic Studies:   Imaging Results    None         Pending Diagnostic Studies:     None         Medications:  Reconciled Home Medications:      Medication List      START taking these medications    insulin glargine 100 unit/mL (3 mL) Inpn pen  Commonly known as:  BASAGLAR KWIKPEN U-100 INSULIN  Inject 20 Units into the skin once daily.     promethazine-dextromethorphan 6.25-15 mg/5 mL Syrp  Commonly known as:  PROMETHAZINE-DM  Take 5 mLs by mouth every 6 (six) hours as needed.        CONTINUE taking these medications    esomeprazole 40 MG capsule  Commonly known as:  NEXIUM  Take 40 mg by mouth before breakfast.     estradiol cypionate 5 mg/mL injection  Commonly known as:  DEPO-ESTRADIOL  Inject into the muscle every 28 days.        STOP taking these medications    clindamycin 300 MG capsule  Commonly known as:  CLEOCIN     HYDROcodone-acetaminophen 7.5-325 mg per tablet  Commonly known as:  NORCO            Indwelling Lines/Drains at time of discharge:   Lines/Drains/Airways          None          Time spent on the discharge of patient: 42 minutes  Patient was seen and examined on the date of discharge and determined to be suitable for discharge.         Ella Slaughter  NP  Department of Hospital Medicine  [facility]

## 2018-09-05 NOTE — PROGRESS NOTES
Went to check pts blood sugar and pt was no longer there. I checked for pts IV I could not find it. Pt left without discharge paper work.

## 2018-09-05 NOTE — PLAN OF CARE
Problem: Patient Care Overview  Goal: Plan of Care Review  Outcome: Ongoing (interventions implemented as appropriate)  POC reviewed, verbalized understanding. Pt remained free from falls, fall precautions in place. Pt is NSR on monitor, box number [id number]. VSS.  Blood glucose monitoring Q4H. NaCl 0.45% infusing. No other c/o at this time. PIV intact. Call bell and personal belongings within reach. Hourly rounding complete. Reminded to call for assistance. Will continue to monitor.

## 2018-09-05 NOTE — ASSESSMENT & PLAN NOTE
- multifactorial due to GI fluid loss, decrease oral intake due to URI, osmotic diuresis from diabetes  - current corrected Na is 150  - NPO, except free water intake of about 500ml every 6 hours  - continue 1/2 NS at 125cc/hr  - repeat labs in AM  - goal Na 145 in 10-12 hours and 140 in 20-24 hours

## 2018-09-05 NOTE — HPI
47F no significant PMH presnets with sore throat x 5 days.  Associated with NP cough. The problem occurs constantly. The problem has not changed since onset.Pertinent negatives include no chest pain, no abdominal pain, no headaches and no shortness of breath. The symptoms are aggravated by swallowing. Nothing relieves the symptoms. She has tried nothing for the symptoms. Also c/o nausea and vomiting x 1.  Denies abdominal pain, , no fever and no headaches.   In ER, glucose >900 and corrected Na 149.  Patient given 2L NS bolus and 20 units regular insulin in ER.  Hospital medicine called for observation.

## 2018-09-05 NOTE — H&P
[facility] Hospital Medicine  History & Physical    Patient Name: Marcie Geiger  MRN: [id number]  Admission Date: 9/4/2018  Attending Physician: Nestor Cosme MD  Primary Care Provider: Rafael Santana MD         Patient information was obtained from patient and ER records.     Subjective:     Principal Problem:Type 2 diabetes mellitus with hyperglycemia    Chief Complaint:   Chief Complaint   Patient presents with    Sore Throat     sore throat for 1 week and vomiting 5 days -1 time a day. also c/o dizziness for 3 days    Emesis     47F no significant PMH presnets with sore throat x 5 days.  Associated with NP cough. The problem occurs constantly. The problem has not changed since onset.Pertinent negatives include no chest pain, no abdominal pain, no headaches and no shortness of breath. The symptoms are aggravated by swallowing. Nothing relieves the symptoms. She has tried nothing for the symptoms. Also c/o nausea and vomiting x 1.  Denies abdominal pain, , no fever and no headaches.   In ER, glucose >900 and corrected Na 149.  Patient given 2L NS bolus and 20 units regular insulin in ER.  Hospital medicine called for observation.         History reviewed. No pertinent past medical history.    History reviewed. No pertinent surgical history.    Review of patient's allergies indicates:  No Known Allergies    No current facility-administered medications on file prior to encounter.      Current Outpatient Medications on File Prior to Encounter   Medication Sig    esomeprazole (NEXIUM) 40 MG capsule Take 40 mg by mouth before breakfast.    clindamycin (CLEOCIN) 300 MG capsule Take 1 capsule (300 mg total) by mouth every 8 (eight) hours.    estradiol cypionate (DEPO-ESTRADIOL) 5 mg/mL injection Inject into the muscle every 28 days.    hydrocodone-acetaminophen 7.5-325mg (NORCO) 7.5-325 mg per tablet Take 1 tablet by mouth every 6 (six) hours as needed for Pain.     Family History      Reviewed and not Pertinent           Tobacco Use    Smoking status: Former Smoker     Types: Cigarettes     Last attempt to quit: 2000     Years since quittin.6    Smokeless tobacco: Never Used   Substance and Sexual Activity    Alcohol use: No     Frequency: Never    Drug use: No    Sexual activity: Yes     Review of Systems   Constitutional: Negative for chills, diaphoresis, fatigue, fever and unexpected weight change.   HENT: Positive for sore throat. Negative for congestion, facial swelling and trouble swallowing.    Eyes: Negative for photophobia, redness and visual disturbance.   Respiratory: Positive for cough. Negative for apnea, chest tightness, shortness of breath and wheezing.    Cardiovascular: Negative for chest pain, palpitations and leg swelling.   Gastrointestinal: Positive for nausea and vomiting. Negative for abdominal distention, abdominal pain, blood in stool, constipation and diarrhea.   Endocrine: Negative for polydipsia, polyphagia and polyuria.   Genitourinary: Negative for difficulty urinating, dysuria, flank pain, frequency, hematuria and urgency.   Musculoskeletal: Negative for arthralgias, back pain, joint swelling, myalgias and neck stiffness.   Skin: Negative for pallor and rash.   Allergic/Immunologic: Negative for immunocompromised state.   Neurological: Positive for dizziness and light-headedness. Negative for tremors, syncope, weakness and headaches.   Psychiatric/Behavioral: Negative for agitation, confusion and suicidal ideas.   All other systems reviewed and are negative.    Objective:     Vital Signs (Most Recent):  Temp: 98.4 °F (36.9 °C) (18)  Pulse: 82 (18)  Resp: 18 (18)  BP: (!) 142/83 (18)  SpO2: 97 % (18) Vital Signs (24h Range):  Temp:  [97.4 °F (36.3 °C)-98.9 °F (37.2 °C)] 98.4 °F (36.9 °C)  Pulse:  [] 82  Resp:  [16-20] 18  SpO2:  [97 %-100 %] 97 %  BP: (118-152)/(66-86) 142/83     Weight: 112.4 kg  (247 lb 13.5 oz)  Body mass index is 37.68 kg/m².    Physical Exam   Constitutional: She is oriented to person, place, and time. She appears well-developed and well-nourished. No distress.   HENT:   Head: Normocephalic and atraumatic.   Mouth/Throat: Oropharynx is clear and moist.   Eyes: Conjunctivae and EOM are normal. Pupils are equal, round, and reactive to light. No scleral icterus.   Neck: Normal range of motion. Neck supple. No JVD present. No thyromegaly present.   Cardiovascular: Normal rate, regular rhythm and intact distal pulses. Exam reveals no gallop and no friction rub.   No murmur heard.  Pulmonary/Chest: Effort normal and breath sounds normal. No respiratory distress. She has no wheezes. She has no rales. She exhibits no tenderness.   Abdominal: Soft. Bowel sounds are normal. She exhibits no distension and no mass. There is no tenderness. There is no guarding.   Musculoskeletal: Normal range of motion. She exhibits no tenderness.   Neurological: She is alert and oriented to person, place, and time. No cranial nerve deficit.   Skin: Skin is warm and dry. Capillary refill takes 2 to 3 seconds. No rash noted. She is not diaphoretic. No erythema.   Psychiatric: She has a normal mood and affect.   Nursing note and vitals reviewed.        CRANIAL NERVES     CN III, IV, VI   Pupils are equal, round, and reactive to light.  Extraocular motions are normal.        Significant Labs:   CBC:   Recent Labs   Lab  09/04/18   0821  09/04/18   2150   WBC  11.93  14.02*   HGB  15.0  14.5   HCT  43.7  42.9   PLT  326  262     CMP:   Recent Labs   Lab  09/04/18   0821   09/04/18   1634  09/04/18   2133  09/04/18   2150   NA  135*   < >  145  142  143   K  4.2   < >  4.4  4.2  4.2   CL  98   < >  111*  108  108   CO2  21*   < >  22*  19*  19*   GLU  978*   < >  400*  407*  412*   BUN  10   < >  8  9  9   CREATININE  1.7*   < >  1.1  1.1  1.1   CALCIUM  9.7   < >  9.2  9.2  9.2   PROT  8.8*   --    --    --    --     ALBUMIN  3.7   --    --    --   3.4*   BILITOT  0.6   --    --    --    --    ALKPHOS  148*   --    --    --    --    AST  35   --    --    --    --    ALT  76*   --    --    --    --    ANIONGAP  16   < >  12  15  16   EGFRNONAA  35.4*   < >  59.9*  60  60    < > = values in this interval not displayed.     Lactic Acid: No results for input(s): LACTATE in the last 48 hours.  Urine Studies:   Recent Labs   Lab  09/04/18   0810   COLORU  Yellow   APPEARANCEUA  Hazy*   PHUR  6.0   SPECGRAV  <=1.005*   PROTEINUA  Negative   GLUCUA  3+*   KETONESU  Negative   BILIRUBINUA  Negative   OCCULTUA  1+*   NITRITE  Negative   UROBILINOGEN  Negative   LEUKOCYTESUR  Negative   RBCUA  3   BACTERIA  Rare   SQUAMEPITHEL  6     All pertinent labs within the past 24 hours have been reviewed.    Significant Imaging: I have reviewed all pertinent imaging results/findings within the past 24 hours.   Imaging Results    None           Assessment/Plan:     * Type 2 diabetes mellitus with hyperglycemia    - newly diagnosed  - check a1c  - continue POC glucose q4h, SSI PRN  - check lipid panel, a1c, tsh, and uds            Hypernatremia    - multifactorial due to GI fluid loss, decrease oral intake due to URI, osmotic diuresis from diabetes  - current corrected Na is 150  - NPO, except free water intake of about 500ml every 6 hours  - continue 1/2 NS at 125cc/hr  - repeat labs in AM  - goal Na 145 in 10-12 hours and 140 in 20-24 hours          Upper respiratory infection    - likely viral  - supportive care with antitussives, duonebs prn sob          Nausea and vomiting    - possible gastroenteritis vs esophagitis vs pancreatitis vs gastroparesis  - check lipase, hiv panel, acute hep panel  - continue IVF             VTE Risk Mitigation (From admission, onward)        Ordered     enoxaparin injection 40 mg  Daily      09/04/18 2158     IP VTE HIGH RISK PATIENT  Once      09/04/18 2158     Place sequential compression device  Until  discontinued      09/04/18 2107             Baez Hugo Cosme MD  Department of Hospital Medicine   [facility]

## 2018-09-05 NOTE — ED NOTES
Spoke to Whitley RN at [facility] to inform her that specimen is hemolyzed and they will need to recollect when pt gets to their facility. She has verbalized understanding.

## 2018-09-05 NOTE — SUBJECTIVE & OBJECTIVE
History reviewed. No pertinent past medical history.    History reviewed. No pertinent surgical history.    Review of patient's allergies indicates:  No Known Allergies    No current facility-administered medications on file prior to encounter.      Current Outpatient Medications on File Prior to Encounter   Medication Sig    esomeprazole (NEXIUM) 40 MG capsule Take 40 mg by mouth before breakfast.    clindamycin (CLEOCIN) 300 MG capsule Take 1 capsule (300 mg total) by mouth every 8 (eight) hours.    estradiol cypionate (DEPO-ESTRADIOL) 5 mg/mL injection Inject into the muscle every 28 days.    hydrocodone-acetaminophen 7.5-325mg (NORCO) 7.5-325 mg per tablet Take 1 tablet by mouth every 6 (six) hours as needed for Pain.     Family History     None        Tobacco Use    Smoking status: Former Smoker     Types: Cigarettes     Last attempt to quit: 2000     Years since quittin.6    Smokeless tobacco: Never Used   Substance and Sexual Activity    Alcohol use: No     Frequency: Never    Drug use: No    Sexual activity: Yes     Review of Systems   Constitutional: Negative for chills, diaphoresis, fatigue, fever and unexpected weight change.   HENT: Positive for sore throat. Negative for congestion, facial swelling and trouble swallowing.    Eyes: Negative for photophobia, redness and visual disturbance.   Respiratory: Positive for cough. Negative for apnea, chest tightness, shortness of breath and wheezing.    Cardiovascular: Negative for chest pain, palpitations and leg swelling.   Gastrointestinal: Positive for nausea and vomiting. Negative for abdominal distention, abdominal pain, blood in stool, constipation and diarrhea.   Endocrine: Negative for polydipsia, polyphagia and polyuria.   Genitourinary: Negative for difficulty urinating, dysuria, flank pain, frequency, hematuria and urgency.   Musculoskeletal: Negative for arthralgias, back pain, joint swelling, myalgias and neck stiffness.   Skin:  Negative for pallor and rash.   Allergic/Immunologic: Negative for immunocompromised state.   Neurological: Positive for dizziness and light-headedness. Negative for tremors, syncope, weakness and headaches.   Psychiatric/Behavioral: Negative for agitation, confusion and suicidal ideas.   All other systems reviewed and are negative.    Objective:     Vital Signs (Most Recent):  Temp: 98.4 °F (36.9 °C) (09/04/18 2317)  Pulse: 82 (09/04/18 2317)  Resp: 18 (09/04/18 2317)  BP: (!) 142/83 (09/04/18 2317)  SpO2: 97 % (09/04/18 2317) Vital Signs (24h Range):  Temp:  [97.4 °F (36.3 °C)-98.9 °F (37.2 °C)] 98.4 °F (36.9 °C)  Pulse:  [] 82  Resp:  [16-20] 18  SpO2:  [97 %-100 %] 97 %  BP: (118-152)/(66-86) 142/83     Weight: 112.4 kg (247 lb 13.5 oz)  Body mass index is 37.68 kg/m².    Physical Exam   Constitutional: She is oriented to person, place, and time. She appears well-developed and well-nourished. No distress.   HENT:   Head: Normocephalic and atraumatic.   Mouth/Throat: Oropharynx is clear and moist.   Eyes: Conjunctivae and EOM are normal. Pupils are equal, round, and reactive to light. No scleral icterus.   Neck: Normal range of motion. Neck supple. No JVD present. No thyromegaly present.   Cardiovascular: Normal rate, regular rhythm and intact distal pulses. Exam reveals no gallop and no friction rub.   No murmur heard.  Pulmonary/Chest: Effort normal and breath sounds normal. No respiratory distress. She has no wheezes. She has no rales. She exhibits no tenderness.   Abdominal: Soft. Bowel sounds are normal. She exhibits no distension and no mass. There is no tenderness. There is no guarding.   Musculoskeletal: Normal range of motion. She exhibits no tenderness.   Neurological: She is alert and oriented to person, place, and time. No cranial nerve deficit.   Skin: Skin is warm and dry. Capillary refill takes 2 to 3 seconds. No rash noted. She is not diaphoretic. No erythema.   Psychiatric: She has a normal  mood and affect.   Nursing note and vitals reviewed.        CRANIAL NERVES     CN III, IV, VI   Pupils are equal, round, and reactive to light.  Extraocular motions are normal.        Significant Labs:   CBC:   Recent Labs   Lab  09/04/18   0821  09/04/18   2150   WBC  11.93  14.02*   HGB  15.0  14.5   HCT  43.7  42.9   PLT  326  262     CMP:   Recent Labs   Lab  09/04/18   0821   09/04/18   1634  09/04/18   2133  09/04/18   2150   NA  135*   < >  145  142  143   K  4.2   < >  4.4  4.2  4.2   CL  98   < >  111*  108  108   CO2  21*   < >  22*  19*  19*   GLU  978*   < >  400*  407*  412*   BUN  10   < >  8  9  9   CREATININE  1.7*   < >  1.1  1.1  1.1   CALCIUM  9.7   < >  9.2  9.2  9.2   PROT  8.8*   --    --    --    --    ALBUMIN  3.7   --    --    --   3.4*   BILITOT  0.6   --    --    --    --    ALKPHOS  148*   --    --    --    --    AST  35   --    --    --    --    ALT  76*   --    --    --    --    ANIONGAP  16   < >  12  15  16   EGFRNONAA  35.4*   < >  59.9*  60  60    < > = values in this interval not displayed.     Lactic Acid: No results for input(s): LACTATE in the last 48 hours.  Urine Studies:   Recent Labs   Lab  09/04/18   0810   COLORU  Yellow   APPEARANCEUA  Hazy*   PHUR  6.0   SPECGRAV  <=1.005*   PROTEINUA  Negative   GLUCUA  3+*   KETONESU  Negative   BILIRUBINUA  Negative   OCCULTUA  1+*   NITRITE  Negative   UROBILINOGEN  Negative   LEUKOCYTESUR  Negative   RBCUA  3   BACTERIA  Rare   SQUAMEPITHEL  6     All pertinent labs within the past 24 hours have been reviewed.    Significant Imaging: I have reviewed all pertinent imaging results/findings within the past 24 hours.   Imaging Results    None

## 2018-09-05 NOTE — CONSULTS
Consult received   Chart reviewed   Patient assessed  She reports was informed by her PCP in the past that her glucose was elevated and she needed to make changes in her diet and exercise.  She does not remember if she received a prescription for any diabetes med's at that time  She was in denial and did not make any changes.  She now understands she must make changes and is willing to do what it takes to feel better  Instructed in diabetes care and management to include the importance of diet, exercise (when PCP approves), med's and monitoring  Instructed in Type 2 diabetes, target glucose values (according to ADA standards), hyper and hypoglycemia to include signs, symptoms, causes and treatment  Instructed in provided Contour next EZ glucose monitor    She does not feel the need to practice.  Recommended she check her glucose FBS and pre-insulin nightly and record results on provided log for PCP visits  Instructed in insulin injection technique using the insulin pen to include:  1.  Attaching pen needle/2 unit safety, air check  2.  Dialing accurate dosage  3.  Injection technique  4.  Insulin storage  5.  Site selection/rotation  She did well with return demo of first insulin self-injection  She was drinking large quantities of regular sharif-aid with pineapple juice.  Instructed in importance of diet, zero-calorie, sugar free only.   Portion control and low fat eating  Not skipping meals or snacks  Info on above provided     She verbalizes understanding  Recommend she attend outpatient diabetes education classes for continued follow-up

## 2018-09-06 LAB — BACTERIA THROAT CULT: NORMAL

## 2018-10-22 DIAGNOSIS — E11.65 TYPE 2 DIABETES MELLITUS WITH HYPERGLYCEMIA: Primary | ICD-10-CM

## 2019-01-16 LAB — HPV16+18+H RISK 12 DNA CVX-IMP: NEGATIVE

## 2019-01-24 DIAGNOSIS — Z12.39 SCREENING BREAST EXAMINATION: Primary | ICD-10-CM

## 2019-02-12 ENCOUNTER — HOSPITAL ENCOUNTER (EMERGENCY)
Facility: HOSPITAL | Age: 49
Discharge: HOME | End: 2019-02-12
Attending: EMERGENCY MEDICINE
Payer: MEDICAID

## 2019-02-12 VITALS
TEMPERATURE: 99 F | BODY MASS INDEX: 37.86 KG/M2 | HEART RATE: 86 BPM | DIASTOLIC BLOOD PRESSURE: 69 MMHG | WEIGHT: 249.81 LBS | HEIGHT: 68 IN | SYSTOLIC BLOOD PRESSURE: 143 MMHG | RESPIRATION RATE: 19 BRPM | OXYGEN SATURATION: 98 %

## 2019-02-12 DIAGNOSIS — R03.0 ELEVATED BLOOD PRESSURE READING: ICD-10-CM

## 2019-02-12 DIAGNOSIS — J06.9 UPPER RESPIRATORY TRACT INFECTION, UNSPECIFIED TYPE: ICD-10-CM

## 2019-02-12 DIAGNOSIS — H69.91 DYSFUNCTION OF RIGHT EUSTACHIAN TUBE: Primary | ICD-10-CM

## 2019-02-12 PROCEDURE — 96372 THER/PROPH/DIAG INJ SC/IM: CPT | Mod: ER

## 2019-02-12 PROCEDURE — 63600175 PHARM REV CODE 636 W HCPCS: Mod: ER | Performed by: EMERGENCY MEDICINE

## 2019-02-12 PROCEDURE — 99284 EMERGENCY DEPT VISIT MOD MDM: CPT | Mod: 25,ER

## 2019-02-12 RX ORDER — PIOGLITAZONEHYDROCHLORIDE 45 MG/1
45 TABLET ORAL DAILY
COMMUNITY
End: 2019-07-02

## 2019-02-12 RX ORDER — BETAMETHASONE SODIUM PHOSPHATE AND BETAMETHASONE ACETATE 3; 3 MG/ML; MG/ML
12 INJECTION, SUSPENSION INTRA-ARTICULAR; INTRALESIONAL; INTRAMUSCULAR; SOFT TISSUE
Status: COMPLETED | OUTPATIENT
Start: 2019-02-12 | End: 2019-02-12

## 2019-02-12 RX ORDER — METFORMIN HYDROCHLORIDE 500 MG/1
500 TABLET ORAL 2 TIMES DAILY WITH MEALS
COMMUNITY
End: 2020-02-03 | Stop reason: SDUPTHER

## 2019-02-12 RX ADMIN — BETAMETHASONE ACETATE AND BETAMETHASONE SODIUM PHOSPHATE 12 MG: 3; 3 INJECTION, SUSPENSION INTRA-ARTICULAR; INTRALESIONAL; INTRAMUSCULAR; SOFT TISSUE at 08:02

## 2019-02-12 NOTE — ED PROVIDER NOTES
History     Chief Complaint   Patient presents with    Otalgia     R x 3 days       Review of patient's allergies indicates:  No Known Allergies    History of Present Illness   HPI    2019, 7:28 AM  The history is provided by the patient    Marcie Geiger is a 48 y.o. female presenting to the ED for right ear pain. Patient started having right ear pain 2 days ago.  Pain is rated 8/10.  It is worse when she swallows.  She hears a cracking sound when she swallows.  This was preceded by symptoms of URI 5 days ago.  Symptoms included rhinorrhea, nasal congestion, right frontal sinus pressure, and cough.  Patient reports that the UR symptoms have improved.  Prior treatment includes ibuprofen.  Patient denies any chest pain, chest pressure, difficulty breathing, nausea, vomiting, shortness of breath, headache, weakness on 1 side      Arrival mode:  Personal Vehicle    PCP: uHmberto Garcia MD     Allergies:  Review of patient's allergies indicates:  No Known Allergies    Past Medical History:  Past Medical History:   Diagnosis Date    Diabetes mellitus     GERD (gastroesophageal reflux disease)     Hyperlipidemia        Past Surgical History:  History reviewed. No pertinent surgical history.      Family History:  History reviewed. No pertinent family history.    Social History:  Social History     Tobacco Use    Smoking status: Former Smoker     Types: Cigarettes     Last attempt to quit: 2000     Years since quittin.1    Smokeless tobacco: Never Used   Substance and Sexual Activity    Alcohol use: No     Frequency: Never    Drug use: No    Sexual activity: Yes        Review of Systems   Review of Systems   Constitutional: Negative for chills and fever.   HENT: Positive for ear pain (Right ear pain), rhinorrhea and sinus pressure. Negative for ear discharge, sinus pain and sore throat.    Respiratory: Positive for cough. Negative for shortness of breath.    Cardiovascular: Negative for chest  "pain.   Gastrointestinal: Negative for nausea and vomiting.   Endocrine: Negative for polyuria.   Genitourinary: Negative for dysuria.   Musculoskeletal: Negative for back pain.   Skin: Negative for rash.   Neurological: Negative for weakness and headaches.   Hematological: Does not bruise/bleed easily.          Physical Exam     Initial Vitals [02/12/19 0733]   BP Pulse Resp Temp SpO2   (!) 143/69 86 19 98.6 °F (37 °C) 98 %      MAP       --          Physical Exam    Nursing Notes and Vital Signs Reviewed.  Constitutional: Patient is in no acute distress. Well-developed and well-nourished.  Head: Atraumatic. Normocephalic.  Eyes: PERRL. EOM intact. Conjunctivae are not pale. No scleral icterus.  ENT: Mucous membranes are moist. Oropharynx is clear and symmetric.  nasal mucosa is boggy with clear nasal drainage.  There is tenderness palpation along the right frontal sinus.  Left TM normal appearance.  Pearly gray.  The right external auditory canal:  No swelling or discharge noted. Right TM pearly gray    Neck: Supple. Full ROM. No lymphadenopathy.  No thyromegaly.  Cardiovascular: Regular rate. Regular rhythm. No murmurs, rubs, or gallops. Distal pulses are 2+ and symmetri.  Pulmonary/Chest: No respiratory distress. Clear to auscultation bilaterally. No wheezing or rales.  Musculoskeletal: Moves all extremities. No obvious deformities. No edema. No calf tenderness.  Skin: Warm and dry.  Neurological:  Alert, awake, and appropriate.  Normal speech.  No acute focal neurological deficits are appreciated.  Psychiatric: Normal affect. Good eye contact. Appropriate in content.     ED Course   Procedures  ED Vital Signs:  Vitals:    02/12/19 0733   BP: (!) 143/69   Pulse: 86   Resp: 19   Temp: 98.6 °F (37 °C)   TempSrc: Oral   SpO2: 98%   Weight: 113.3 kg (249 lb 12.5 oz)   Height: 5' 8" (1.727 m)       Abnormal Lab Results:  Labs Reviewed - No data to display           The Emergency Provider reviewed the vital signs and " test results, which are outlined above.     ED Discussion     7:53 AM  Reassessment: Dr. Miller reassessed the pt.  Discussed risk benefits of receiving Celestone.  Patient educated watch for elevated blood sugars.  Patient agreeable for treatment was Celestone  The pt is resting comfortably and is NAD.  Pt states their sx have improved. Discussed test results, shared treatment plan, specific conditions for return, and the need for f/u.  Answered their questions at this time.  Pt understands and agrees to the plan.  The pt has remained hemodynamically stable through ED course and is stable for discharge.      I discussed with patient and/or family/caretaker that evaluation in the ED does not suggest any emergent or life threatening medical conditions requiring immediate intervention beyond what was provided in the ED, and I believe patient is safe for discharge.  Regardless, an unremarkable evaluation in the ED does not preclude the development or presence of a serious of life threatening condition. As such, patient was instructed to return immediately for any worsening or change in current symptoms.      ED Medication(s):  Medications   betamethasone acetate-betamethasone sodium phosphate injection 12 mg (12 mg Intramuscular Given 2/12/19 0802)          Medication List      ASK your doctor about these medications    esomeprazole 40 MG capsule  Commonly known as:  NEXIUM     estradiol cypionate 5 mg/mL injection  Commonly known as:  DEPO-ESTRADIOL     insulin glargine 100 units/mL (3mL) SubQ pen  Commonly known as:  BASAGLAR KWIKPEN U-100 INSULIN  Inject 20 Units into the skin once daily.     metFORMIN 500 MG tablet  Commonly known as:  GLUCOPHAGE     pioglitazone 45 MG tablet  Commonly known as:  ACTOS            Follow-up Information     Humberto Garcia MD In 2 days.    Specialty:  Family Medicine  Why:  Return to emergency department for:  Worsening pain, swelling, drainage, or worsening condition  Contact  information:  [id number] [facility]  [place] [address]  [phone]             Yayo WALDROP DO [place] In 2 days.    Specialty:  Family Medicine  Contact information:  [address] [facility] [address]  [place] [address]  [phone]                        MIPS Measures     Smoker? No     Hypertension: Pre-hypertension/Hypertension: The pt has been informed that they may have pre-hypertension or hypertension based on a blood pressure reading in the ED. I recommend that the pt call the PCP listed on their discharge instructions or a physician of their choice this week to arrange f/u for further evaluation of possible pre-hypertension or hypertension.          Medical Decision Making                  Clinical Impression       ICD-10-CM ICD-9-CM   1. Dysfunction of right eustachian tube H69.81 381.81   2. Upper respiratory tract infection, unspecified type J06.9 465.9   3. Elevated blood pressure reading R03.0 796.2       Disposition:   Disposition: Discharged  Condition: Stable           Alivia Miller,   02/12/19 0822

## 2019-02-12 NOTE — DISCHARGE INSTRUCTIONS
Use over the counter:  Afrin nasal spray OTC as directed.    Do not use more than 3 days in a row.    Use over the counter:   Nasal saline.  Use this as needed for dryness or congestion.     Get plenty of rest, keep well hydrated.    For throat pain, suck on lozenges or popsicles.    Return to the ED for:   Severe headache, confusion, difficulty breathing, rash, shortness of breath, fever or worsening condition.

## 2019-03-07 ENCOUNTER — TELEPHONE (OUTPATIENT)
Dept: INTERNAL MEDICINE | Facility: CLINIC | Age: 49
End: 2019-03-07

## 2019-03-07 NOTE — TELEPHONE ENCOUNTER
Pt call returned, pt advised to keep scheduled appt and if need care before than to schedule with [facility] and keep appt scheduled new pt  Medicaid appt

## 2019-03-07 NOTE — TELEPHONE ENCOUNTER
----- Message from Priyanka Bruner sent at 3/7/2019  1:53 PM CST -----  Contact: [phone]   Pt would like to know if she can get a sooner appt than the one that is schedule.  Pt stating that she is in need of a check up and have other health issues. Please call back at [phone].  [facility]

## 2019-06-18 ENCOUNTER — PATIENT OUTREACH (OUTPATIENT)
Dept: ADMINISTRATIVE | Facility: HOSPITAL | Age: 49
End: 2019-06-18

## 2019-07-02 ENCOUNTER — OFFICE VISIT (OUTPATIENT)
Dept: INTERNAL MEDICINE | Facility: CLINIC | Age: 49
End: 2019-07-02
Payer: MEDICAID

## 2019-07-02 VITALS
HEART RATE: 74 BPM | HEIGHT: 68 IN | TEMPERATURE: 97 F | BODY MASS INDEX: 38.79 KG/M2 | DIASTOLIC BLOOD PRESSURE: 66 MMHG | RESPIRATION RATE: 19 BRPM | SYSTOLIC BLOOD PRESSURE: 130 MMHG | OXYGEN SATURATION: 99 % | WEIGHT: 255.94 LBS

## 2019-07-02 DIAGNOSIS — E66.01 SEVERE OBESITY (BMI 35.0-39.9) WITH COMORBIDITY: ICD-10-CM

## 2019-07-02 DIAGNOSIS — Z12.4 CERVICAL CANCER SCREENING: Primary | ICD-10-CM

## 2019-07-02 DIAGNOSIS — Z12.39 SCREENING BREAST EXAMINATION: ICD-10-CM

## 2019-07-02 DIAGNOSIS — E11.9 NEW ONSET TYPE 2 DIABETES MELLITUS: ICD-10-CM

## 2019-07-02 PROBLEM — E87.0 HYPERNATREMIA: Status: RESOLVED | Noted: 2018-09-04 | Resolved: 2019-07-02

## 2019-07-02 PROBLEM — R11.2 NAUSEA AND VOMITING: Status: RESOLVED | Noted: 2018-09-04 | Resolved: 2019-07-02

## 2019-07-02 PROBLEM — J06.9 UPPER RESPIRATORY INFECTION: Status: RESOLVED | Noted: 2018-09-04 | Resolved: 2019-07-02

## 2019-07-02 PROCEDURE — 99215 OFFICE O/P EST HI 40 MIN: CPT | Mod: PBBFAC,PO | Performed by: FAMILY MEDICINE

## 2019-07-02 PROCEDURE — 99204 PR OFFICE/OUTPT VISIT, NEW, LEVL IV, 45-59 MIN: ICD-10-PCS | Mod: S$PBB,,, | Performed by: FAMILY MEDICINE

## 2019-07-02 PROCEDURE — 99204 OFFICE O/P NEW MOD 45 MIN: CPT | Mod: S$PBB,,, | Performed by: FAMILY MEDICINE

## 2019-07-02 PROCEDURE — 99999 PR PBB SHADOW E&M-EST. PATIENT-LVL V: CPT | Mod: PBBFAC,,, | Performed by: FAMILY MEDICINE

## 2019-07-02 PROCEDURE — 99999 PR PBB SHADOW E&M-EST. PATIENT-LVL V: ICD-10-PCS | Mod: PBBFAC,,, | Performed by: FAMILY MEDICINE

## 2019-07-02 RX ORDER — MEDROXYPROGESTERONE ACETATE 150 MG/ML
150 INJECTION, SUSPENSION INTRAMUSCULAR
Status: DISCONTINUED | OUTPATIENT
Start: 2019-08-01 | End: 2020-08-27

## 2019-07-02 RX ORDER — LANCETS
EACH MISCELLANEOUS
Refills: 11 | COMMUNITY
Start: 2019-04-05 | End: 2020-11-11 | Stop reason: SDUPTHER

## 2019-07-02 RX ORDER — ATORVASTATIN CALCIUM 20 MG/1
20 TABLET, FILM COATED ORAL DAILY
Qty: 90 TABLET | Refills: 3 | Status: SHIPPED | OUTPATIENT
Start: 2019-07-02 | End: 2020-02-03 | Stop reason: SDUPTHER

## 2019-07-02 RX ORDER — CETIRIZINE HYDROCHLORIDE 10 MG/1
TABLET ORAL
Refills: 1 | COMMUNITY
Start: 2019-06-18 | End: 2020-03-10 | Stop reason: SDUPTHER

## 2019-07-02 RX ORDER — CALCIUM CITRATE/VITAMIN D3 200MG-6.25
TABLET ORAL
Refills: 11 | COMMUNITY
Start: 2019-06-15 | End: 2020-07-07 | Stop reason: SDUPTHER

## 2019-07-02 RX ORDER — PEN NEEDLE, DIABETIC 31 GX5/16"
NEEDLE, DISPOSABLE MISCELLANEOUS
Refills: 3 | COMMUNITY
Start: 2019-06-03 | End: 2019-08-07 | Stop reason: SDUPTHER

## 2019-07-02 RX ORDER — MEDROXYPROGESTERONE ACETATE 150 MG/ML
150 INJECTION, SUSPENSION INTRAMUSCULAR
COMMUNITY
End: 2019-12-23 | Stop reason: SDUPTHER

## 2019-07-02 NOTE — PROGRESS NOTES
"Subjective:       Patient ID: Marcie Geiger is a 48 y.o. female.    Chief Complaint: Establish Care and Diabetic Foot Exam    HPI    Here today to establish care.  She has past medical history of diabetes which was diagnosed in September of 2018.  She has been seen Dr. Humberto corona and is currently on 20 units of basal insulin along with Actos and metformin.  Has been very compliant with her medications however still has some struggles with diet and has not been exercising.  She is up-to-date on her eye exam which was most recently in September of 2018.  Will be due for mammogram later this year.  Also is up-to-date on her Pap smear which was at the health clinic in January of this year.    Family History   Problem Relation Age of Onset    Diabetes Mother     Heart disease Mother     Heart attack Father     Heart disease Father     Diabetes Brother     Hypertension Brother        Current Outpatient Medications:     BD ULTRA-FINE MINI PEN NEEDLE 31 gauge x 3/16" Ndle, USE WITH BASAGLAR ( ONCE DAILY), Disp: , Rfl: 3    cetirizine (ZYRTEC) 10 MG tablet, TAKE 1 TABLET(S) EVERY DAY BY ORAL ROUTE IN THE MORNING FOR 30 DAYS., Disp: , Rfl: 1    esomeprazole (NEXIUM) 40 MG capsule, Take 40 mg by mouth before breakfast., Disp: , Rfl:     insulin glargine (BASAGLAR KWIKPEN U-100 INSULIN) 100 unit/mL (3 mL) InPn pen, Inject 20 Units into the skin once daily., Disp: 15 mL, Rfl: 0    LANCETS,ULTRA THIN 26 gauge Misc, USE TO TEST 2 TIMES DAILY, Disp: , Rfl: 11    medroxyPROGESTERone (DEPO-PROVERA) 150 mg/mL injection, Inject 150 mg into the muscle every 3 (three) months., Disp: , Rfl:     metFORMIN (GLUCOPHAGE) 500 MG tablet, Take 500 mg by mouth 2 (two) times daily with meals., Disp: , Rfl:     TRUE METRIX GLUCOSE TEST STRIP Strp, TEST 2 TIMES DAILY, Disp: , Rfl: 11    atorvastatin (LIPITOR) 20 MG tablet, Take 1 tablet (20 mg total) by mouth once daily., Disp: 90 tablet, Rfl: 3    liraglutide 0.6 mg/0.1 " "mL, 18 mg/3 mL, subq PNIJ (VICTOZA 3-HERRERA) 0.6 mg/0.1 mL (18 mg/3 mL) PnIj, Inject 1.2 mg into the skin once daily., Disp: 18 mL, Rfl: 3    Current Facility-Administered Medications:     [START ON 8/1/2019] medroxyPROGESTERone (DEPO-PROVERA) injection 150 mg, 150 mg, Intramuscular, Q90 Days, Yayo Wing DO    Review of Systems   Constitutional: Negative for chills and fever.   HENT: Negative for congestion and sore throat.    Eyes: Negative for visual disturbance.   Respiratory: Negative for cough and shortness of breath.    Cardiovascular: Negative for chest pain.   Gastrointestinal: Negative for abdominal pain, constipation and diarrhea.   Endocrine: Negative for polydipsia and polyuria.   Genitourinary: Negative for difficulty urinating and menstrual problem.   Skin: Negative for rash.   Neurological: Negative for dizziness.   Hematological: Does not bruise/bleed easily.       Objective:   /66 (BP Location: Left arm, Patient Position: Sitting, BP Method: Large (Automatic))   Pulse 74   Temp 97.1 °F (36.2 °C) (Tympanic)   Resp 19   Ht 5' 8" (1.727 m)   Wt 116.1 kg (255 lb 15.3 oz)   LMP  (LMP Unknown)   SpO2 99%   BMI 38.92 kg/m²      Physical Exam   Constitutional: She is oriented to person, place, and time. She appears well-developed and well-nourished. No distress.   HENT:   Head: Normocephalic and atraumatic.   Nose: Nose normal.   Eyes: Pupils are equal, round, and reactive to light. Conjunctivae and EOM are normal. Right eye exhibits no discharge. Left eye exhibits no discharge.   Neck: No thyromegaly present.   Cardiovascular: Normal rate and regular rhythm.   No murmur heard.  Pulmonary/Chest: Effort normal and breath sounds normal. No respiratory distress.   Abdominal: Soft. She exhibits no distension.   Musculoskeletal: She exhibits no edema.   Neurological: She is alert and oriented to person, place, and time.   Skin: No rash noted. She is not diaphoretic.   Psychiatric: She has a " normal mood and affect. Her behavior is normal.     FOOT EVALUATION: 10 gram monofilament exam with protective sensation intact bilaterally. Nails appropriately trimmed. No ulcers. Distal pulses palpable.    Assessment & Plan     Problem List Items Addressed This Visit        Endocrine    New onset type 2 diabetes mellitus    Current Assessment & Plan       Unclear for diabetes controlled this time.  Getting records to look at the labs that she had done 1 month ago.  Recommended that since she has been having some issues with appetite control, that we started on Victoza.  Continue on the basal insulin and metformin for now.  Provided her with resources for information on diet and exercise.  Will plan to have her follow up at the beginning of September and do lab work at that time.  Started on a statin medication         Relevant Medications    liraglutide 0.6 mg/0.1 mL, 18 mg/3 mL, subq PNIJ (VICTOZA 3-HERRERA) 0.6 mg/0.1 mL (18 mg/3 mL) PnIj    Other Relevant Orders    Comprehensive metabolic panel    Microalbumin/creatinine urine ratio    Hemoglobin A1c    Lipid panel    Severe obesity (BMI 35.0-39.9) with comorbidity    Current Assessment & Plan     Counseled on importance on diet and exercise to decrease risk of comorbid conditions and for improved quality of life.             Other Visit Diagnoses     Cervical cancer screening    -  Primary    Relevant Orders    Ambulatory referral to Obstetrics / Gynecology    Screening breast examination                Follow up in about 3 months (around 10/2/2019) for Diabetes.    Disclaimer:  This note may have been prepared using voice recognition software, it may have not been extensively proofed, as such there could be errors within the text such as sound alike errors.

## 2019-07-02 NOTE — ASSESSMENT & PLAN NOTE
Unclear for diabetes controlled this time.  Getting records to look at the labs that she had done 1 month ago.  Recommended that since she has been having some issues with appetite control, that we started on Victoza.  Continue on the basal insulin and metformin for now.  Provided her with resources for information on diet and exercise.  Will plan to have her follow up at the beginning of September and do lab work at that time.  Started on a statin medication

## 2019-08-01 ENCOUNTER — CLINICAL SUPPORT (OUTPATIENT)
Dept: INTERNAL MEDICINE | Facility: CLINIC | Age: 49
End: 2019-08-01
Payer: MEDICAID

## 2019-08-01 PROCEDURE — 96372 THER/PROPH/DIAG INJ SC/IM: CPT | Mod: PBBFAC,PO

## 2019-08-01 PROCEDURE — 99999 PR PBB SHADOW E&M-EST. PATIENT-LVL II: ICD-10-PCS | Mod: PBBFAC,,,

## 2019-08-01 PROCEDURE — 99212 OFFICE O/P EST SF 10 MIN: CPT | Mod: PBBFAC,PO

## 2019-08-01 PROCEDURE — 99999 PR PBB SHADOW E&M-EST. PATIENT-LVL II: CPT | Mod: PBBFAC,,,

## 2019-08-01 RX ADMIN — MEDROXYPROGESTERONE ACETATE 150 MG: 150 INJECTION, SUSPENSION, EXTENDED RELEASE INTRAMUSCULAR at 08:08

## 2019-08-07 ENCOUNTER — PATIENT OUTREACH (OUTPATIENT)
Dept: ADMINISTRATIVE | Facility: HOSPITAL | Age: 49
End: 2019-08-07

## 2019-08-07 RX ORDER — PEN NEEDLE, DIABETIC 31 GX5/16"
NEEDLE, DISPOSABLE MISCELLANEOUS
Qty: 100 EACH | Refills: 3 | Status: SHIPPED | OUTPATIENT
Start: 2019-08-07 | End: 2020-10-19 | Stop reason: SDUPTHER

## 2019-08-14 ENCOUNTER — PATIENT OUTREACH (OUTPATIENT)
Dept: ADMINISTRATIVE | Facility: HOSPITAL | Age: 49
End: 2019-08-14

## 2019-08-30 ENCOUNTER — HOSPITAL ENCOUNTER (OUTPATIENT)
Dept: RADIOLOGY | Facility: HOSPITAL | Age: 49
Discharge: HOME | End: 2019-08-30
Attending: NURSE PRACTITIONER
Payer: MEDICAID

## 2019-08-30 VITALS — WEIGHT: 255 LBS | BODY MASS INDEX: 38.65 KG/M2 | HEIGHT: 68 IN

## 2019-08-30 PROCEDURE — 77067 SCR MAMMO BI INCL CAD: CPT | Mod: TC,PO

## 2019-08-30 PROCEDURE — 77067 MAMMO DIGITAL SCREENING BILAT WITH CAD: ICD-10-PCS | Mod: 26,,, | Performed by: RADIOLOGY

## 2019-08-30 PROCEDURE — 77067 SCR MAMMO BI INCL CAD: CPT | Mod: 26,,, | Performed by: RADIOLOGY

## 2019-09-16 ENCOUNTER — LAB VISIT (OUTPATIENT)
Dept: LAB | Facility: HOSPITAL | Age: 49
End: 2019-09-16
Attending: FAMILY MEDICINE
Payer: MEDICAID

## 2019-09-16 DIAGNOSIS — E11.9 NEW ONSET TYPE 2 DIABETES MELLITUS: ICD-10-CM

## 2019-09-16 LAB
ALBUMIN SERPL BCP-MCNC: 3.7 G/DL (ref 3.5–5.2)
ALBUMIN/CREAT UR: 40.7 UG/MG (ref 0–30)
ALP SERPL-CCNC: 73 U/L (ref 55–135)
ALT SERPL W/O P-5'-P-CCNC: 21 U/L (ref 10–44)
ANION GAP SERPL CALC-SCNC: 10 MMOL/L (ref 8–16)
AST SERPL-CCNC: 19 U/L (ref 10–40)
BILIRUB SERPL-MCNC: 0.4 MG/DL (ref 0.1–1)
BUN SERPL-MCNC: 7 MG/DL (ref 6–20)
CALCIUM SERPL-MCNC: 9.3 MG/DL (ref 8.7–10.5)
CHLORIDE SERPL-SCNC: 110 MMOL/L (ref 95–110)
CHOLEST SERPL-MCNC: 115 MG/DL (ref 120–199)
CHOLEST/HDLC SERPL: 3.4 {RATIO} (ref 2–5)
CO2 SERPL-SCNC: 24 MMOL/L (ref 23–29)
CREAT SERPL-MCNC: 0.8 MG/DL (ref 0.5–1.4)
CREAT UR-MCNC: 150 MG/DL (ref 15–325)
EST. GFR  (AFRICAN AMERICAN): >60 ML/MIN/1.73 M^2
EST. GFR  (NON AFRICAN AMERICAN): >60 ML/MIN/1.73 M^2
ESTIMATED AVG GLUCOSE: 103 MG/DL (ref 68–131)
GLUCOSE SERPL-MCNC: 102 MG/DL (ref 70–110)
HBA1C MFR BLD HPLC: 5.2 % (ref 4–5.6)
HDLC SERPL-MCNC: 34 MG/DL (ref 40–75)
HDLC SERPL: 29.6 % (ref 20–50)
LDLC SERPL CALC-MCNC: 65.4 MG/DL (ref 63–159)
MICROALBUMIN UR DL<=1MG/L-MCNC: 61 UG/ML
NONHDLC SERPL-MCNC: 81 MG/DL
POTASSIUM SERPL-SCNC: 3.6 MMOL/L (ref 3.5–5.1)
PROT SERPL-MCNC: 7.4 G/DL (ref 6–8.4)
SODIUM SERPL-SCNC: 144 MMOL/L (ref 136–145)
TRIGL SERPL-MCNC: 78 MG/DL (ref 30–150)

## 2019-09-16 PROCEDURE — 83036 HEMOGLOBIN GLYCOSYLATED A1C: CPT

## 2019-09-16 PROCEDURE — 80053 COMPREHEN METABOLIC PANEL: CPT | Mod: PO

## 2019-09-16 PROCEDURE — 80061 LIPID PANEL: CPT

## 2019-09-16 PROCEDURE — 36415 COLL VENOUS BLD VENIPUNCTURE: CPT | Mod: PO

## 2019-09-16 PROCEDURE — 82043 UR ALBUMIN QUANTITATIVE: CPT

## 2019-09-19 ENCOUNTER — APPOINTMENT (OUTPATIENT)
Dept: LAB | Facility: HOSPITAL | Age: 49
End: 2019-09-19
Attending: FAMILY MEDICINE
Payer: MEDICAID

## 2019-09-19 ENCOUNTER — OFFICE VISIT (OUTPATIENT)
Dept: INTERNAL MEDICINE | Facility: CLINIC | Age: 49
End: 2019-09-19
Payer: MEDICAID

## 2019-09-19 VITALS
TEMPERATURE: 98 F | SYSTOLIC BLOOD PRESSURE: 136 MMHG | RESPIRATION RATE: 18 BRPM | WEIGHT: 242.5 LBS | BODY MASS INDEX: 36.75 KG/M2 | DIASTOLIC BLOOD PRESSURE: 82 MMHG | HEIGHT: 68 IN | HEART RATE: 72 BPM

## 2019-09-19 DIAGNOSIS — E66.01 SEVERE OBESITY (BMI 35.0-39.9) WITH COMORBIDITY: ICD-10-CM

## 2019-09-19 DIAGNOSIS — E11.9 NEW ONSET TYPE 2 DIABETES MELLITUS: Primary | ICD-10-CM

## 2019-09-19 DIAGNOSIS — Z12.4 CERVICAL CANCER SCREENING: ICD-10-CM

## 2019-09-19 PROCEDURE — 88175 CYTOPATH C/V AUTO FLUID REDO: CPT

## 2019-09-19 PROCEDURE — 90472 IMMUNIZATION ADMIN EACH ADD: CPT | Mod: PBBFAC,PO

## 2019-09-19 PROCEDURE — 99213 OFFICE O/P EST LOW 20 MIN: CPT | Mod: PBBFAC,PO | Performed by: FAMILY MEDICINE

## 2019-09-19 PROCEDURE — 87624 HPV HI-RISK TYP POOLED RSLT: CPT

## 2019-09-19 PROCEDURE — 99214 OFFICE O/P EST MOD 30 MIN: CPT | Mod: S$PBB,25,, | Performed by: FAMILY MEDICINE

## 2019-09-19 PROCEDURE — 99214 PR OFFICE/OUTPT VISIT, EST, LEVL IV, 30-39 MIN: ICD-10-PCS | Mod: S$PBB,25,, | Performed by: FAMILY MEDICINE

## 2019-09-19 PROCEDURE — 99999 PR PBB SHADOW E&M-EST. PATIENT-LVL III: CPT | Mod: PBBFAC,,, | Performed by: FAMILY MEDICINE

## 2019-09-19 PROCEDURE — 90686 IIV4 VACC NO PRSV 0.5 ML IM: CPT | Mod: PBBFAC,PO

## 2019-09-19 PROCEDURE — 99999 PR PBB SHADOW E&M-EST. PATIENT-LVL III: ICD-10-PCS | Mod: PBBFAC,,, | Performed by: FAMILY MEDICINE

## 2019-09-19 RX ORDER — INSULIN GLARGINE 100 [IU]/ML
10 INJECTION, SOLUTION SUBCUTANEOUS DAILY
Qty: 9 ML | Refills: 3 | Status: SHIPPED | OUTPATIENT
Start: 2019-09-19 | End: 2019-09-23

## 2019-09-19 NOTE — PROGRESS NOTES
"Subjective:       Patient ID: Marcie Geiger is a 48 y.o. female.    Chief Complaint: Follow-up    HPI  Came in today to follow-up on diabetes.  Her hemoglobin A1c made tremendous improvement and was most recently 5.2.  She continues to take metformin victoza and 20 units of Lantus.  Has no complaints at this time.  Says that she used to get a lot of skin infections but has not had that recently.    Reviewed her chart and noticed that the scanned copy of recent Pap smear in 2017 showed that she had ASCUS.  HPV negative at that time.  Cannot tell if she had any other testing done afterwards.    Family History   Problem Relation Age of Onset    Diabetes Mother     Heart disease Mother     Heart attack Father     Heart disease Father     Diabetes Brother     Hypertension Brother        Current Outpatient Medications:     atorvastatin (LIPITOR) 20 MG tablet, Take 1 tablet (20 mg total) by mouth once daily., Disp: 90 tablet, Rfl: 3    BD ULTRA-FINE MINI PEN NEEDLE 31 gauge x 3/16" Ndle, USE WITH BASAGLAR ( ONCE DAILY), Disp: 100 each, Rfl: 3    cetirizine (ZYRTEC) 10 MG tablet, TAKE 1 TABLET(S) EVERY DAY BY ORAL ROUTE IN THE MORNING FOR 30 DAYS., Disp: , Rfl: 1    esomeprazole (NEXIUM) 40 MG capsule, Take 40 mg by mouth before breakfast., Disp: , Rfl:     LANCETS,ULTRA THIN 26 gauge Misc, USE TO TEST 2 TIMES DAILY, Disp: , Rfl: 11    liraglutide 0.6 mg/0.1 mL, 18 mg/3 mL, subq PNIJ (VICTOZA 3-HERRERA) 0.6 mg/0.1 mL (18 mg/3 mL) PnIj, Inject 1.2 mg into the skin once daily., Disp: 18 mL, Rfl: 3    medroxyPROGESTERone (DEPO-PROVERA) 150 mg/mL injection, Inject 150 mg into the muscle every 3 (three) months., Disp: , Rfl:     metFORMIN (GLUCOPHAGE) 500 MG tablet, Take 500 mg by mouth 2 (two) times daily with meals., Disp: , Rfl:     TRUE METRIX GLUCOSE TEST STRIP Strp, TEST 2 TIMES DAILY, Disp: , Rfl: 11    insulin (BASAGLAR KWIKPEN U-100 INSULIN) glargine 100 units/mL (3mL) SubQ pen, Inject 10 Units into " "the skin once daily., Disp: 9 mL, Rfl: 3    Current Facility-Administered Medications:     medroxyPROGESTERone (DEPO-PROVERA) injection 150 mg, 150 mg, Intramuscular, Q90 Days, Yayo Wing DO, 150 mg at 08/01/19 0807    Review of Systems   Constitutional: Negative for chills, fatigue and fever.   Respiratory: Negative for cough and shortness of breath.    Cardiovascular: Negative for chest pain.   Gastrointestinal: Negative for abdominal pain.   Endocrine: Positive for polydipsia. Negative for polyphagia and polyuria.   Skin: Negative for pallor and rash.   Neurological: Negative for dizziness, seizures, speech difficulty and weakness.   Psychiatric/Behavioral: Negative for confusion.       Objective:   /82   Pulse 72   Temp 97.6 °F (36.4 °C) (Tympanic)   Resp 18   Ht 5' 8" (1.727 m)   Wt 110 kg (242 lb 8.1 oz)   BMI 36.87 kg/m²      Physical Exam   Constitutional: She is oriented to person, place, and time. She appears well-developed and well-nourished.   HENT:   Head: Normocephalic and atraumatic.   Eyes: Conjunctivae are normal.   Cardiovascular: Normal rate.   Pulmonary/Chest: Effort normal. No respiratory distress.   Genitourinary: Vagina normal. Cervix exhibits friability. Cervix exhibits no discharge.   Musculoskeletal: She exhibits no edema.   Neurological: She is alert and oriented to person, place, and time. Coordination normal.   Skin: Skin is warm and dry. No rash noted.   Psychiatric: She has a normal mood and affect. Her behavior is normal.   Vitals reviewed.      Assessment & Plan     Problem List Items Addressed This Visit        Renal/    Cervical cancer screening    Current Assessment & Plan     Pap smear done today.         Relevant Orders    Liquid-based pap smear, screening    HPV High Risk Genotypes, PCR       Endocrine    New onset type 2 diabetes mellitus - Primary    Current Assessment & Plan     Tremendous improvement in diabetic control.  Recommended that she actually " decrease the basal insulin 10 units from 20 units.  Will recheck in 6 months to verify that everything seems stable.  Continue on same other medications and try to implement more exercise.         Relevant Medications    insulin (BASAGLAR KWIKPEN U-100 INSULIN) glargine 100 units/mL (3mL) SubQ pen    Severe obesity (BMI 35.0-39.9) with comorbidity    Current Assessment & Plan     Has lost some weight since the last visit.  I encouraged her to keep up the good work.                 No follow-ups on file.    Disclaimer:  This note may have been prepared using voice recognition software, it may have not been extensively proofed, as such there could be errors within the text such as sound alike errors.

## 2019-09-19 NOTE — ASSESSMENT & PLAN NOTE
Tremendous improvement in diabetic control.  Recommended that she actually decrease the basal insulin 10 units from 20 units.  Will recheck in 6 months to verify that everything seems stable.  Continue on same other medications and try to implement more exercise.

## 2019-09-20 ENCOUNTER — TELEPHONE (OUTPATIENT)
Dept: INTERNAL MEDICINE | Facility: CLINIC | Age: 49
End: 2019-09-20

## 2019-09-23 RX ORDER — INSULIN GLARGINE 100 [IU]/ML
10 INJECTION, SOLUTION SUBCUTANEOUS NIGHTLY
Qty: 9 ML | Refills: 3 | Status: SHIPPED | OUTPATIENT
Start: 2019-09-23 | End: 2020-01-28

## 2019-09-24 LAB
HPV HR 12 DNA CVX QL NAA+PROBE: NEGATIVE
HPV16 AG SPEC QL: NEGATIVE
HPV18 DNA SPEC QL NAA+PROBE: NEGATIVE

## 2019-09-25 ENCOUNTER — TELEPHONE (OUTPATIENT)
Dept: INTERNAL MEDICINE | Facility: CLINIC | Age: 49
End: 2019-09-25

## 2019-09-25 NOTE — TELEPHONE ENCOUNTER
----- Message from Perla Young sent at 9/25/2019  4:28 PM CDT -----  Contact: Patient  Type:  Patient Returning Call    Who Called:  Patient  Who Left Message for Patient:  Michelle  Does the patient know what this is regarding?:  Yes, original message  Best Call Back Number:  [phone] (home)    Additional Information:  na

## 2019-09-25 NOTE — TELEPHONE ENCOUNTER
----- Message from Shahram Rosenbaum sent at 9/25/2019  3:56 PM CDT -----  Contact: pt   Pt has some questions about medication changes, liraglutide 0.6 mg/0.1 mL, 18 mg/3 mL, subq PNIJ (VICTOZA 3-HERRERA) 0.6 mg/0.1 mL (18 mg/3 mL) PnIj , pt states pharmacy has script for lantus which was supposed to be changed. pls return call.         ..[phone]          ..  [facility]/pharmacy #[id number] - [place], [place] - [id number] [facility] [place]  [place] [address]  Phone: [phone] Fax: [phone]

## 2019-09-25 NOTE — TELEPHONE ENCOUNTER
----- Message from Shahram Rosenbaum sent at 9/25/2019  3:56 PM CDT -----  Contact: pt   Pt has some questions about medication changes, liraglutide 0.6 mg/0.1 mL, 18 mg/3 mL, subq PNIJ (VICTOZA 3-HERRERA) 0.6 mg/0.1 mL (18 mg/3 mL) PnIj , pt states pharmacy has script for lantus which was supposed to be changed. pls return call.         ..[phone]          ..  [facility]/pharmacy #[id number] - [place], [place] - [id number] [facility]  [place] [address]  Phone: [phone] Fax: [phone]

## 2019-10-07 ENCOUNTER — PATIENT OUTREACH (OUTPATIENT)
Dept: ADMINISTRATIVE | Facility: HOSPITAL | Age: 49
End: 2019-10-07

## 2019-12-23 ENCOUNTER — CLINICAL SUPPORT (OUTPATIENT)
Dept: INTERNAL MEDICINE | Facility: CLINIC | Age: 49
End: 2019-12-23
Payer: MEDICAID

## 2019-12-23 DIAGNOSIS — Z30.9 ENCOUNTER FOR CONTRACEPTIVE MANAGEMENT, UNSPECIFIED TYPE: Primary | ICD-10-CM

## 2019-12-23 LAB
B-HCG UR QL: NEGATIVE
CTP QC/QA: YES

## 2019-12-23 PROCEDURE — 99999 PR PBB SHADOW E&M-EST. PATIENT-LVL II: ICD-10-PCS | Mod: PBBFAC,,,

## 2019-12-23 PROCEDURE — 81025 URINE PREGNANCY TEST: CPT | Mod: PBBFAC,PO

## 2019-12-23 PROCEDURE — 96372 THER/PROPH/DIAG INJ SC/IM: CPT | Mod: PBBFAC,PO

## 2019-12-23 PROCEDURE — 99212 OFFICE O/P EST SF 10 MIN: CPT | Mod: PBBFAC,PO,25

## 2019-12-23 PROCEDURE — 99999 PR PBB SHADOW E&M-EST. PATIENT-LVL II: CPT | Mod: PBBFAC,,,

## 2019-12-23 RX ORDER — MEDROXYPROGESTERONE ACETATE 150 MG/ML
150 INJECTION, SUSPENSION INTRAMUSCULAR ONCE
Status: DISCONTINUED | OUTPATIENT
Start: 2019-12-23 | End: 2019-12-23

## 2019-12-23 RX ADMIN — MEDROXYPROGESTERONE ACETATE 150 MG: 150 INJECTION, SUSPENSION, EXTENDED RELEASE INTRAMUSCULAR at 03:12

## 2020-01-27 ENCOUNTER — TELEPHONE (OUTPATIENT)
Dept: INTERNAL MEDICINE | Facility: CLINIC | Age: 50
End: 2020-01-27

## 2020-01-27 NOTE — TELEPHONE ENCOUNTER
Pt call returned, appt time given for 7:40 with . Unable to scheduled due to restrictions will have someone schedule

## 2020-01-27 NOTE — TELEPHONE ENCOUNTER
----- Message from Raegan Omalley sent at 1/27/2020 12:05 PM CST -----  Contact: pt  Returning a call.

## 2020-01-27 NOTE — TELEPHONE ENCOUNTER
----- Message from Nelly Bailey sent at 1/27/2020 11:46 AM CST -----  Contact: self [phone]  .Type:  Sooner Apoointment Request    Caller is requesting a sooner appointment.  Caller declined first available appointment listed below.  Caller will not accept being placed on the waitlist and is requesting a message be sent to doctor.  Name of Caller:Marcie Fely  When is the first available appointment?03/2020  Symptoms:abscess under arm  Would the patient rather a call back or a response via [facility]? Call back  Best Call Back Number:[phone]  Additional Information:

## 2020-01-28 ENCOUNTER — OFFICE VISIT (OUTPATIENT)
Dept: INTERNAL MEDICINE | Facility: CLINIC | Age: 50
End: 2020-01-28
Payer: MEDICAID

## 2020-01-28 ENCOUNTER — LAB VISIT (OUTPATIENT)
Dept: LAB | Facility: HOSPITAL | Age: 50
End: 2020-01-28
Attending: FAMILY MEDICINE
Payer: MEDICAID

## 2020-01-28 VITALS
WEIGHT: 238.13 LBS | RESPIRATION RATE: 19 BRPM | BODY MASS INDEX: 36.09 KG/M2 | SYSTOLIC BLOOD PRESSURE: 133 MMHG | TEMPERATURE: 97 F | DIASTOLIC BLOOD PRESSURE: 75 MMHG | OXYGEN SATURATION: 96 % | HEIGHT: 68 IN | HEART RATE: 103 BPM

## 2020-01-28 DIAGNOSIS — L02.419 ABSCESS OF AXILLA: ICD-10-CM

## 2020-01-28 DIAGNOSIS — E11.9 NEW ONSET TYPE 2 DIABETES MELLITUS: ICD-10-CM

## 2020-01-28 DIAGNOSIS — E11.9 NEW ONSET TYPE 2 DIABETES MELLITUS: Primary | ICD-10-CM

## 2020-01-28 DIAGNOSIS — E66.01 SEVERE OBESITY (BMI 35.0-39.9) WITH COMORBIDITY: ICD-10-CM

## 2020-01-28 LAB
ESTIMATED AVG GLUCOSE: 105 MG/DL (ref 68–131)
HBA1C MFR BLD HPLC: 5.3 % (ref 4–5.6)

## 2020-01-28 PROCEDURE — 99999 PR PBB SHADOW E&M-EST. PATIENT-LVL IV: ICD-10-PCS | Mod: PBBFAC,,, | Performed by: FAMILY MEDICINE

## 2020-01-28 PROCEDURE — 99214 PR OFFICE/OUTPT VISIT, EST, LEVL IV, 30-39 MIN: ICD-10-PCS | Mod: S$PBB,,, | Performed by: FAMILY MEDICINE

## 2020-01-28 PROCEDURE — 99214 OFFICE O/P EST MOD 30 MIN: CPT | Mod: S$PBB,,, | Performed by: FAMILY MEDICINE

## 2020-01-28 PROCEDURE — 36415 COLL VENOUS BLD VENIPUNCTURE: CPT | Mod: PO

## 2020-01-28 PROCEDURE — 99999 PR PBB SHADOW E&M-EST. PATIENT-LVL IV: CPT | Mod: PBBFAC,,, | Performed by: FAMILY MEDICINE

## 2020-01-28 PROCEDURE — 99214 OFFICE O/P EST MOD 30 MIN: CPT | Mod: PBBFAC,PO | Performed by: FAMILY MEDICINE

## 2020-01-28 PROCEDURE — 83036 HEMOGLOBIN GLYCOSYLATED A1C: CPT

## 2020-01-28 RX ORDER — SULFAMETHOXAZOLE AND TRIMETHOPRIM 800; 160 MG/1; MG/1
1 TABLET ORAL 2 TIMES DAILY
Qty: 14 TABLET | Refills: 0 | Status: SHIPPED | OUTPATIENT
Start: 2020-01-28 | End: 2020-02-04

## 2020-01-28 NOTE — ASSESSMENT & PLAN NOTE
Due to location of abscess and history of recurrence will send to gen surg to ensure complete excision of cyst sac.

## 2020-01-28 NOTE — ASSESSMENT & PLAN NOTE
-Last A1c 5.2% 9/16/19, down from 11.9% (9/2018). Continue Victoza 1.2 mg daily and Metformin 500 mg BID.

## 2020-01-28 NOTE — PROGRESS NOTES
" Patient ID: Marcie Geiger is a 49 y.o. female.    Chief Complaint: Abscess (under rightarm )    HPI 50 yo F here with complaint of right axilla abscess that appeared 5 days ago. Has gone down in since but painful to touch. No drainage. Has right axilla cyst that first appeared 3 weeks ago and is non painful. Has tried no treatment modalities. No aggravating or alleviating factors. Diagnosed with diabetes approx 1 year ago. Has had multiple axilla abscess requiring drainage since diabetes diagnosis.     Last A1c 5.2% 9/16/19, down from 11.9% (9/2018). BGs at home 110-120 after meals. Takes Victoza 1.2 mg daily and Metformin 500 mg BID.    Family History   Problem Relation Age of Onset    Diabetes Mother     Heart disease Mother     Heart attack Father     Heart disease Father     Diabetes Brother     Hypertension Brother        Current Outpatient Medications:     atorvastatin (LIPITOR) 20 MG tablet, Take 1 tablet (20 mg total) by mouth once daily., Disp: 90 tablet, Rfl: 3    BD ULTRA-FINE MINI PEN NEEDLE 31 gauge x 3/16" Ndle, USE WITH BASAGLAR ( ONCE DAILY), Disp: 100 each, Rfl: 3    cetirizine (ZYRTEC) 10 MG tablet, TAKE 1 TABLET(S) EVERY DAY BY ORAL ROUTE IN THE MORNING FOR 30 DAYS., Disp: , Rfl: 1    esomeprazole (NEXIUM) 40 MG capsule, Take 40 mg by mouth before breakfast., Disp: , Rfl:     LANCETS,ULTRA THIN 26 gauge Misc, USE TO TEST 2 TIMES DAILY, Disp: , Rfl: 11    liraglutide 0.6 mg/0.1 mL, 18 mg/3 mL, subq PNIJ (VICTOZA 3-HERRERA) 0.6 mg/0.1 mL (18 mg/3 mL) PnIj, Inject 1.2 mg into the skin once daily., Disp: 18 mL, Rfl: 3    metFORMIN (GLUCOPHAGE) 500 MG tablet, Take 500 mg by mouth 2 (two) times daily with meals., Disp: , Rfl:     TRUE METRIX GLUCOSE TEST STRIP Strp, TEST 2 TIMES DAILY, Disp: , Rfl: 11    sulfamethoxazole-trimethoprim 800-160mg (BACTRIM DS) 800-160 mg Tab, Take 1 tablet by mouth 2 (two) times daily. for 7 days, Disp: 14 tablet, Rfl: 0    Current Facility-Administered " "Medications:     medroxyPROGESTERone (DEPO-PROVERA) injection 150 mg, 150 mg, Intramuscular, Q90 Days, Yayo Wing DO, 150 mg at 12/23/19 1533    Review of Systems   Constitutional: Negative for activity change, chills, fatigue and fever.   Skin: Positive for color change. Negative for rash.        Axilla abscess and cysts   Neurological: Negative for weakness.       Objective:   /75 (BP Location: Left arm, Patient Position: Sitting, BP Method: X-Large (Automatic))   Pulse 103   Temp 97.4 °F (36.3 °C) (Tympanic)   Resp 19   Ht 5' 8" (1.727 m)   Wt 108 kg (238 lb 1.6 oz)   LMP  (LMP Unknown)   SpO2 96%   BMI 36.20 kg/m²      Physical Exam   Constitutional: She is oriented to person, place, and time. She appears well-developed and well-nourished.   HENT:   Head: Normocephalic and atraumatic.   Cardiovascular: Normal rate, regular rhythm and normal heart sounds.   Pulmonary/Chest: Effort normal and breath sounds normal.   Musculoskeletal: Normal range of motion.   Neurological: She is alert and oriented to person, place, and time.   Skin: Skin is warm.   Psychiatric: She has a normal mood and affect. Her behavior is normal.         Upper abscess mild ttp, lower cyst non tender    Assessment & Plan     Problem List Items Addressed This Visit        ID    Abscess of axilla    Current Assessment & Plan     Due to location of abscess and history of recurrence will send to gen surg to ensure complete excision of cyst sac.          Relevant Medications    sulfamethoxazole-trimethoprim 800-160mg (BACTRIM DS) 800-160 mg Tab    Other Relevant Orders    Ambulatory Referral to General Surgery       Endocrine    New onset type 2 diabetes mellitus - Primary    Current Assessment & Plan     -Last A1c 5.2% 9/16/19, down from 11.9% (9/2018). Continue Victoza 1.2 mg daily and Metformin 500 mg BID.         Relevant Orders    Hemoglobin A1c    Severe obesity (BMI 35.0-39.9) with comorbidity            Follow up if " symptoms worsen or fail to improve.      Disclaimer:  This note may have been prepared using voice recognition software, without extensive proofreading. As such, there could be errors within the text such as sound alike errors.

## 2020-01-29 ENCOUNTER — TELEPHONE (OUTPATIENT)
Dept: SURGERY | Facility: CLINIC | Age: 50
End: 2020-01-29

## 2020-01-29 NOTE — TELEPHONE ENCOUNTER
Spoke with pt via phone, during conversation appt was memo to 02/11/2020 @ 9:15 with Dr Eaton/[facility]. Pt verbalized an understanding

## 2020-02-03 RX ORDER — METFORMIN HYDROCHLORIDE 500 MG/1
500 TABLET ORAL 2 TIMES DAILY WITH MEALS
Qty: 180 TABLET | Refills: 4 | Status: SHIPPED | OUTPATIENT
Start: 2020-02-03 | End: 2020-11-11 | Stop reason: SDUPTHER

## 2020-02-03 RX ORDER — ATORVASTATIN CALCIUM 20 MG/1
20 TABLET, FILM COATED ORAL DAILY
Qty: 90 TABLET | Refills: 3 | Status: SHIPPED | OUTPATIENT
Start: 2020-02-03 | End: 2020-11-11 | Stop reason: SDUPTHER

## 2020-02-03 NOTE — TELEPHONE ENCOUNTER
----- Message from Joselin Stein sent at 2/3/2020 11:06 AM CST -----  Contact: [facility] [place]  Stated she's calling about pt medication (metFORMIN (GLUCOPHAGE) 500 MG tablet, atorvastatin (LIPITOR) 20 MG tablet) new prescription are needed, she can be reached at [phone] Thanks         [facility] - [facility] [address] [facility]  [address] [facility] [address]  Phone: [phone] Fax: [phone]

## 2020-02-03 NOTE — TELEPHONE ENCOUNTER
Patient pharmacist called over requesting a new script for her metformin 500 mg, Atorvastatin 20 mg to be sent over to [facility] pharmacy. LOV: 01/28/2020.      Please Advise

## 2020-02-11 ENCOUNTER — OFFICE VISIT (OUTPATIENT)
Dept: SURGERY | Facility: CLINIC | Age: 50
End: 2020-02-11
Payer: MEDICAID

## 2020-02-11 VITALS
BODY MASS INDEX: 35.68 KG/M2 | WEIGHT: 235.44 LBS | TEMPERATURE: 99 F | SYSTOLIC BLOOD PRESSURE: 132 MMHG | DIASTOLIC BLOOD PRESSURE: 75 MMHG | HEIGHT: 68 IN | HEART RATE: 94 BPM

## 2020-02-11 DIAGNOSIS — L73.2 AXILLARY HIDRADENITIS SUPPURATIVA: Primary | ICD-10-CM

## 2020-02-11 PROCEDURE — 99202 OFFICE O/P NEW SF 15 MIN: CPT | Mod: S$PBB,,, | Performed by: SURGERY

## 2020-02-11 PROCEDURE — 99213 OFFICE O/P EST LOW 20 MIN: CPT | Mod: PBBFAC,PO | Performed by: SURGERY

## 2020-02-11 PROCEDURE — 99999 PR PBB SHADOW E&M-EST. PATIENT-LVL III: ICD-10-PCS | Mod: PBBFAC,,, | Performed by: SURGERY

## 2020-02-11 PROCEDURE — 99999 PR PBB SHADOW E&M-EST. PATIENT-LVL III: CPT | Mod: PBBFAC,,, | Performed by: SURGERY

## 2020-02-11 PROCEDURE — 99202 PR OFFICE/OUTPT VISIT, NEW, LEVL II, 15-29 MIN: ICD-10-PCS | Mod: S$PBB,,, | Performed by: SURGERY

## 2020-02-11 RX ORDER — NAPROXEN SODIUM 220 MG
220 TABLET ORAL
COMMUNITY
End: 2021-12-09

## 2020-02-11 NOTE — PROGRESS NOTES
Marcie is 49-year-old  female patient who is being seen at the request of her primary care provider for the evaluation of ongoing and persistent infection involving her axilla below size.  It had started several years ago it never resolved.  It comes and goes.  A examination confirms the patient has hidradenitis involving bilateral axilla.  There are multiple scar tissues and tracks are palpable.  Recently she had an infection, and managed with conservative management with oral antibiotic.  There are no evidence of an ongoing infection at this time.  Patient and I had discussed about the pathogenesis of hidradenitis.  The recommendation is prevention, namely with avoid using the oral rinse and keeping the area clean and dry.  The patient was informed that apple cider vinegar use during shower would alleviate the symptoms.  The patient is expected follow up with me on a p.r.n. basis.  With an acute abscess, she may require incision and drainage in the office setting.  Total time approximately 20 minute was spent this patient, more than 50% of that was spent for treatment planning and counseling.    Mendoza Eaton

## 2020-02-13 ENCOUNTER — TELEPHONE (OUTPATIENT)
Dept: INTERNAL MEDICINE | Facility: CLINIC | Age: 50
End: 2020-02-13

## 2020-02-13 NOTE — TELEPHONE ENCOUNTER
----- Message from Ale Nieves sent at 2/13/2020  7:06 AM CST -----  Contact: pt  Type:  Same Day Appointment Request    Caller is requesting a same day appointment.  Caller declined first available appointment listed below.    Name of CallerPatient  When is the first available appointment?4/4  Symptoms:Sprane Toe  Best Call Back Number:[phone]  Additional Information: pt states she hit toe on bed last night

## 2020-02-14 ENCOUNTER — OFFICE VISIT (OUTPATIENT)
Dept: INTERNAL MEDICINE | Facility: CLINIC | Age: 50
End: 2020-02-14
Payer: MEDICAID

## 2020-02-14 VITALS
RESPIRATION RATE: 20 BRPM | HEART RATE: 85 BPM | HEIGHT: 68 IN | TEMPERATURE: 97 F | SYSTOLIC BLOOD PRESSURE: 136 MMHG | WEIGHT: 238.13 LBS | BODY MASS INDEX: 36.09 KG/M2 | OXYGEN SATURATION: 98 % | DIASTOLIC BLOOD PRESSURE: 82 MMHG

## 2020-02-14 DIAGNOSIS — S93.509A TOE SPRAIN, INITIAL ENCOUNTER: Primary | ICD-10-CM

## 2020-02-14 DIAGNOSIS — K21.9 GASTROESOPHAGEAL REFLUX DISEASE WITHOUT ESOPHAGITIS: ICD-10-CM

## 2020-02-14 DIAGNOSIS — Z30.42 ENCOUNTER FOR DEPO-PROVERA CONTRACEPTION: ICD-10-CM

## 2020-02-14 PROCEDURE — 99999 PR PBB SHADOW E&M-EST. PATIENT-LVL IV: ICD-10-PCS | Mod: PBBFAC,,, | Performed by: FAMILY MEDICINE

## 2020-02-14 PROCEDURE — 99214 OFFICE O/P EST MOD 30 MIN: CPT | Mod: PBBFAC,PO | Performed by: FAMILY MEDICINE

## 2020-02-14 PROCEDURE — 99999 PR PBB SHADOW E&M-EST. PATIENT-LVL IV: CPT | Mod: PBBFAC,,, | Performed by: FAMILY MEDICINE

## 2020-02-14 PROCEDURE — 99214 PR OFFICE/OUTPT VISIT, EST, LEVL IV, 30-39 MIN: ICD-10-PCS | Mod: S$PBB,,, | Performed by: FAMILY MEDICINE

## 2020-02-14 PROCEDURE — 99214 OFFICE O/P EST MOD 30 MIN: CPT | Mod: S$PBB,,, | Performed by: FAMILY MEDICINE

## 2020-02-14 RX ORDER — MEDROXYPROGESTERONE ACETATE 150 MG/ML
150 INJECTION, SUSPENSION INTRAMUSCULAR
Status: DISCONTINUED | OUTPATIENT
Start: 2020-02-14 | End: 2020-02-14

## 2020-02-14 RX ORDER — KETOROLAC TROMETHAMINE 30 MG/ML
60 INJECTION, SOLUTION INTRAMUSCULAR; INTRAVENOUS
Status: COMPLETED | OUTPATIENT
Start: 2020-02-14 | End: 2020-02-14

## 2020-02-14 RX ORDER — ESOMEPRAZOLE MAGNESIUM 40 MG/1
40 CAPSULE, DELAYED RELEASE ORAL
Qty: 30 CAPSULE | Refills: 5 | Status: SHIPPED | OUTPATIENT
Start: 2020-02-14 | End: 2020-08-03

## 2020-02-14 RX ADMIN — KETOROLAC TROMETHAMINE 60 MG: 60 INJECTION, SOLUTION INTRAMUSCULAR at 07:02

## 2020-02-14 NOTE — PROGRESS NOTES
" Patient ID: Marcie Geiger is a 49 y.o. female.    Chief Complaint: Toe Pain (left 4th toe, trauma stumped it and now its red, swelling, painfull); Contraception (Is she due for depo prevera shot); and Medication Refill (nexium)    HPI Pt is 50 yo F here with left toe pain after hitting toe on bed 2 days ago. Toe red, swollen and painful. Can walk with no difficulty.  Says pain severity has decreased to 6/10.  Previously 10/10 when incident 1st occurred.  Has not taking any pain medications. Reports swelling and bruising have improved.    Family History   Problem Relation Age of Onset    Diabetes Mother     Heart disease Mother     Heart attack Father     Heart disease Father     Diabetes Brother     Hypertension Brother        Current Outpatient Medications:     atorvastatin (LIPITOR) 20 MG tablet, Take 1 tablet (20 mg total) by mouth once daily., Disp: 90 tablet, Rfl: 3    BD ULTRA-FINE MINI PEN NEEDLE 31 gauge x 3/16" Ndle, USE WITH BASAGLAR ( ONCE DAILY), Disp: 100 each, Rfl: 3    cetirizine (ZYRTEC) 10 MG tablet, TAKE 1 TABLET(S) EVERY DAY BY ORAL ROUTE IN THE MORNING FOR 30 DAYS., Disp: , Rfl: 1    esomeprazole (NEXIUM) 40 MG capsule, Take 1 capsule (40 mg total) by mouth before breakfast., Disp: 30 capsule, Rfl: 5    LANCETS,ULTRA THIN 26 gauge Misc, USE TO TEST 2 TIMES DAILY, Disp: , Rfl: 11    liraglutide 0.6 mg/0.1 mL, 18 mg/3 mL, subq PNIJ (VICTOZA 3-HERRERA) 0.6 mg/0.1 mL (18 mg/3 mL) PnIj, Inject 1.2 mg into the skin once daily., Disp: 18 mL, Rfl: 3    metFORMIN (GLUCOPHAGE) 500 MG tablet, Take 1 tablet (500 mg total) by mouth 2 (two) times daily with meals., Disp: 180 tablet, Rfl: 4    TRUE METRIX GLUCOSE TEST STRIP Strp, TEST 2 TIMES DAILY, Disp: , Rfl: 11    naproxen sodium (ANAPROX) 220 MG tablet, Take 220 mg by mouth every 12 (twelve) hours., Disp: , Rfl:     Current Facility-Administered Medications:     medroxyPROGESTERone (DEPO-PROVERA) injection 150 mg, 150 mg, " "Intramuscular, Q90 Days, Yayo Wing, , 150 mg at 12/23/19 1533    Review of Systems   Constitutional: Negative for activity change and unexpected weight change.   HENT: Negative for hearing loss, rhinorrhea and trouble swallowing.    Eyes: Negative for discharge and visual disturbance.   Respiratory: Negative for chest tightness and wheezing.    Cardiovascular: Negative for chest pain and palpitations.   Gastrointestinal: Negative for blood in stool, constipation, diarrhea and vomiting.   Endocrine: Negative for polydipsia and polyuria.   Genitourinary: Negative for difficulty urinating, dysuria, hematuria and menstrual problem.   Musculoskeletal: Negative for arthralgias, joint swelling and neck pain.        Left toe pain   Skin: Positive for color change.   Neurological: Negative for weakness and headaches.   Psychiatric/Behavioral: Negative for confusion and dysphoric mood.       Objective:   /82 (BP Location: Right arm, Patient Position: Sitting, BP Method: Large (Manual))   Pulse 85   Temp 97.1 °F (36.2 °C) (Tympanic)   Resp 20   Ht 5' 8" (1.727 m)   Wt 108 kg (238 lb 1.6 oz)   LMP  (LMP Unknown)   SpO2 98%   BMI 36.20 kg/m²      Physical Exam   Constitutional: She appears well-developed and well-nourished. No distress.   Cardiovascular: Normal rate, regular rhythm, normal heart sounds and intact distal pulses.   Pulses:       Dorsalis pedis pulses are 2+ on the right side, and 2+ on the left side.   Pulmonary/Chest: Effort normal and breath sounds normal.   Musculoskeletal: Normal range of motion. She exhibits edema. She exhibits no tenderness.   No restricted ROM   Feet:   Right Foot:   Protective Sensation: 5 sites tested. 5 sites sensed.   Skin Integrity: Negative for ulcer, blister or skin breakdown.   Left Foot:   Protective Sensation: 5 sites tested. 5 sites sensed.   Skin Integrity: Negative for ulcer, blister or skin breakdown.   Neurological: No sensory deficit.   Skin: She is " not diaphoretic. There is erythema.   Left toe 4th with ecchymoses, non ttp       Assessment & Plan     Problem List Items Addressed This Visit        Renal/    Encounter for Depo-Provera contraception    Current Assessment & Plan     -Next depo due march 23            GI    Gastroesophageal reflux disease without esophagitis    Relevant Medications    esomeprazole (NEXIUM) 40 MG capsule       Other    Toe sprain, initial encounter - Primary    Relevant Medications    ketorolac injection 60 mg (Completed)            Follow up if symptoms worsen or fail to improve.      Disclaimer:  This note may have been prepared using voice recognition software, without extensive proofreading. As such, there could be errors within the text such as sound alike errors.

## 2020-02-19 ENCOUNTER — TELEPHONE (OUTPATIENT)
Dept: INTERNAL MEDICINE | Facility: CLINIC | Age: 50
End: 2020-02-19

## 2020-02-21 ENCOUNTER — TELEPHONE (OUTPATIENT)
Dept: INTERNAL MEDICINE | Facility: CLINIC | Age: 50
End: 2020-02-21

## 2020-03-10 ENCOUNTER — OFFICE VISIT (OUTPATIENT)
Dept: INTERNAL MEDICINE | Facility: CLINIC | Age: 50
End: 2020-03-10
Payer: MEDICAID

## 2020-03-10 VITALS
TEMPERATURE: 98 F | OXYGEN SATURATION: 97 % | HEART RATE: 90 BPM | DIASTOLIC BLOOD PRESSURE: 64 MMHG | HEIGHT: 68 IN | SYSTOLIC BLOOD PRESSURE: 128 MMHG | WEIGHT: 241.38 LBS | BODY MASS INDEX: 36.58 KG/M2

## 2020-03-10 DIAGNOSIS — J30.9 ALLERGIC RHINITIS, UNSPECIFIED SEASONALITY, UNSPECIFIED TRIGGER: ICD-10-CM

## 2020-03-10 DIAGNOSIS — E11.9 TYPE 2 DIABETES MELLITUS WITHOUT COMPLICATION, WITHOUT LONG-TERM CURRENT USE OF INSULIN: Primary | ICD-10-CM

## 2020-03-10 DIAGNOSIS — Z30.42 ENCOUNTER FOR DEPO-PROVERA CONTRACEPTION: ICD-10-CM

## 2020-03-10 DIAGNOSIS — E66.01 SEVERE OBESITY (BMI 35.0-39.9) WITH COMORBIDITY: ICD-10-CM

## 2020-03-10 DIAGNOSIS — Z30.9 ENCOUNTER FOR CONTRACEPTIVE MANAGEMENT, UNSPECIFIED TYPE: ICD-10-CM

## 2020-03-10 PROCEDURE — 99999 PR PBB SHADOW E&M-EST. PATIENT-LVL IV: CPT | Mod: PBBFAC,,, | Performed by: FAMILY MEDICINE

## 2020-03-10 PROCEDURE — 99214 OFFICE O/P EST MOD 30 MIN: CPT | Mod: PBBFAC,PO | Performed by: FAMILY MEDICINE

## 2020-03-10 PROCEDURE — 99214 OFFICE O/P EST MOD 30 MIN: CPT | Mod: S$PBB,,, | Performed by: FAMILY MEDICINE

## 2020-03-10 PROCEDURE — 99214 PR OFFICE/OUTPT VISIT, EST, LEVL IV, 30-39 MIN: ICD-10-PCS | Mod: S$PBB,,, | Performed by: FAMILY MEDICINE

## 2020-03-10 PROCEDURE — 99999 PR PBB SHADOW E&M-EST. PATIENT-LVL IV: ICD-10-PCS | Mod: PBBFAC,,, | Performed by: FAMILY MEDICINE

## 2020-03-10 RX ORDER — GLUCOSAM/CHON-MSM1/C/MANG/BOSW 500-416.6
TABLET ORAL
COMMUNITY
Start: 2020-02-28 | End: 2020-04-22

## 2020-03-10 RX ORDER — CETIRIZINE HYDROCHLORIDE 10 MG/1
TABLET ORAL
Qty: 30 TABLET | Refills: 2 | Status: SHIPPED | OUTPATIENT
Start: 2020-03-10 | End: 2020-06-01

## 2020-03-10 RX ORDER — MEDROXYPROGESTERONE ACETATE 150 MG/ML
150 INJECTION, SUSPENSION INTRAMUSCULAR ONCE
Status: COMPLETED | OUTPATIENT
Start: 2020-03-25 | End: 2020-03-26

## 2020-03-10 RX ORDER — PEN NEEDLE, DIABETIC 31 GX3/16"
NEEDLE, DISPOSABLE MISCELLANEOUS
COMMUNITY
Start: 2020-02-28 | End: 2020-03-26

## 2020-03-10 NOTE — PROGRESS NOTES
" Patient ID: Marcie Geiger is a 49 y.o. female.    Chief Complaint: Follow-up (DM)    HPI Patient is 49-year-old female here for diabetes type 2 follow-up.  Last hemoglobin A1c 5.3.  Doing well on metformin and Victoza.  Has made dietary modifications and exercise as when she can.  She is up to date on diabetic eye and foot exams.    Family History   Problem Relation Age of Onset    Diabetes Mother     Heart disease Mother     Heart attack Father     Heart disease Father     Diabetes Brother     Hypertension Brother        Current Outpatient Medications:     atorvastatin (LIPITOR) 20 MG tablet, Take 1 tablet (20 mg total) by mouth once daily., Disp: 90 tablet, Rfl: 3    BD ULTRA-FINE MINI PEN NEEDLE 31 gauge x 3/16" Ndle, USE WITH BASAGLAR ( ONCE DAILY), Disp: 100 each, Rfl: 3    cetirizine (ZYRTEC) 10 MG tablet, Take 1 tab PO daily, Disp: 30 tablet, Rfl: 2    esomeprazole (NEXIUM) 40 MG capsule, Take 1 capsule (40 mg total) by mouth before breakfast., Disp: 30 capsule, Rfl: 5    LANCETS,ULTRA THIN 26 gauge Misc, USE TO TEST 2 TIMES DAILY, Disp: , Rfl: 11    liraglutide 0.6 mg/0.1 mL, 18 mg/3 mL, subq PNIJ (VICTOZA 3-HERRERA) 0.6 mg/0.1 mL (18 mg/3 mL) PnIj, Inject 1.2 mg into the skin once daily., Disp: 18 mL, Rfl: 3    metFORMIN (GLUCOPHAGE) 500 MG tablet, Take 1 tablet (500 mg total) by mouth 2 (two) times daily with meals., Disp: 180 tablet, Rfl: 4    naproxen sodium (ANAPROX) 220 MG tablet, Take 220 mg by mouth every 12 (twelve) hours., Disp: , Rfl:     SURE COMFORT PEN NEEDLE 31 gauge x 5/16" Ndle, , Disp: , Rfl:     TRUE METRIX GLUCOSE TEST STRIP Strp, TEST 2 TIMES DAILY, Disp: , Rfl: 11    TRUEPLUS LANCETS 28 gauge Misc, , Disp: , Rfl:     Current Facility-Administered Medications:     medroxyPROGESTERone (DEPO-PROVERA) injection 150 mg, 150 mg, Intramuscular, Q90 Days, Yayo Wing DO, 150 mg at 12/23/19 1533    [START ON 3/25/2020] medroxyPROGESTERone (DEPO-PROVERA) injection " "150 mg, 150 mg, Intramuscular, Once, Michelle Beavers MD    Review of Systems   Constitutional: Negative for activity change and unexpected weight change.   HENT: Negative for hearing loss, rhinorrhea and trouble swallowing.    Eyes: Negative for discharge and visual disturbance.   Respiratory: Negative for chest tightness and wheezing.    Cardiovascular: Negative for chest pain and palpitations.   Gastrointestinal: Negative for blood in stool, constipation, diarrhea and vomiting.   Endocrine: Negative for polydipsia and polyuria.   Genitourinary: Negative for difficulty urinating, dysuria, hematuria and menstrual problem.   Musculoskeletal: Negative for arthralgias, joint swelling and neck pain.   Neurological: Negative for weakness and headaches.   Psychiatric/Behavioral: Negative for confusion and dysphoric mood.       Objective:   /64 (BP Location: Right arm, Patient Position: Sitting, BP Method: X-Large (Automatic))   Pulse 90   Temp 97.8 °F (36.6 °C) (Tympanic)   Ht 5' 8" (1.727 m)   Wt 109.5 kg (241 lb 6.5 oz)   SpO2 97%   BMI 36.71 kg/m²      Physical Exam   Constitutional: She is oriented to person, place, and time. She appears well-developed and well-nourished.   HENT:   Head: Normocephalic and atraumatic.   Eyes: Conjunctivae are normal.   Neck: Normal range of motion.   Cardiovascular: Normal rate, regular rhythm and normal heart sounds.   Pulmonary/Chest: Effort normal and breath sounds normal.   Abdominal: Soft.   Musculoskeletal: Normal range of motion.   Neurological: She is alert and oriented to person, place, and time.   Skin: Skin is warm.   Psychiatric: She has a normal mood and affect. Her behavior is normal.       Assessment & Plan     Problem List Items Addressed This Visit        Renal/    Encounter for Depo-Provera contraception    Current Assessment & Plan     -next injection due March 26.  She will return for nurse visit            Endocrine    Type 2 diabetes mellitus " without complication, without long-term current use of insulin - Primary    Current Assessment & Plan     -last A1c well controlled at 5.3. Continue Metformin and Victoza. UTD on diabetic eye and foot exams         Severe obesity (BMI 35.0-39.9) with comorbidity    Current Assessment & Plan     -has made dietary modifications and exercises when she can           Other Visit Diagnoses     Allergic rhinitis, unspecified seasonality, unspecified trigger        Relevant Medications    cetirizine (ZYRTEC) 10 MG tablet    Encounter for contraceptive management, unspecified type        Relevant Medications    medroxyPROGESTERone (DEPO-PROVERA) injection 150 mg (Start on 3/25/2020 12:00 AM)          Disclaimer:  This note may have been prepared using voice recognition software, without extensive proofreading. As such, there could be errors within the text such as sound alike errors.

## 2020-03-10 NOTE — ASSESSMENT & PLAN NOTE
-last A1c well controlled at 5.3. Continue Metformin and Victoza. UTD on diabetic eye and foot exams

## 2020-03-26 ENCOUNTER — CLINICAL SUPPORT (OUTPATIENT)
Dept: INTERNAL MEDICINE | Facility: CLINIC | Age: 50
End: 2020-03-26
Payer: MEDICAID

## 2020-03-26 ENCOUNTER — TELEPHONE (OUTPATIENT)
Dept: INTERNAL MEDICINE | Facility: CLINIC | Age: 50
End: 2020-03-26

## 2020-03-26 DIAGNOSIS — Z30.42 ENCOUNTER FOR DEPO-PROVERA CONTRACEPTION: Primary | ICD-10-CM

## 2020-03-26 PROCEDURE — 99999 PR PBB SHADOW E&M-EST. PATIENT-LVL II: CPT | Mod: PBBFAC,,,

## 2020-03-26 PROCEDURE — 99999 PR PBB SHADOW E&M-EST. PATIENT-LVL II: ICD-10-PCS | Mod: PBBFAC,,,

## 2020-03-26 PROCEDURE — 99212 OFFICE O/P EST SF 10 MIN: CPT | Mod: PBBFAC,PO

## 2020-03-26 PROCEDURE — 96372 THER/PROPH/DIAG INJ SC/IM: CPT | Mod: PBBFAC,PO

## 2020-03-26 RX ORDER — PEN NEEDLE, DIABETIC 31 GX3/16"
NEEDLE, DISPOSABLE MISCELLANEOUS
Qty: 100 EACH | Refills: 1 | Status: SHIPPED | OUTPATIENT
Start: 2020-03-26 | End: 2020-05-19

## 2020-03-26 RX ADMIN — MEDROXYPROGESTERONE ACETATE 150 MG: 150 INJECTION, SUSPENSION, EXTENDED RELEASE INTRAMUSCULAR at 08:03

## 2020-03-26 NOTE — PROGRESS NOTES
Patient was scheduled for Depo injection today; she is 2 days past due date for injection so completed a UPT test which was negative. Injection given IM left ventrogluteal with no problems.

## 2020-03-26 NOTE — TELEPHONE ENCOUNTER
Naomi, can you sign order for poct urine pregnancy she is getting her depo shot today.     Thank you

## 2020-04-22 RX ORDER — GLUCOSAM/CHON-MSM1/C/MANG/BOSW 500-416.6
TABLET ORAL
Qty: 100 EACH | Refills: 11 | Status: SHIPPED | OUTPATIENT
Start: 2020-04-22 | End: 2020-07-13

## 2020-05-10 ENCOUNTER — PATIENT MESSAGE (OUTPATIENT)
Dept: INTERNAL MEDICINE | Facility: CLINIC | Age: 50
End: 2020-05-10

## 2020-05-11 ENCOUNTER — OFFICE VISIT (OUTPATIENT)
Dept: INTERNAL MEDICINE | Facility: CLINIC | Age: 50
End: 2020-05-11
Payer: MEDICAID

## 2020-05-11 VITALS
BODY MASS INDEX: 36.69 KG/M2 | WEIGHT: 242.06 LBS | DIASTOLIC BLOOD PRESSURE: 73 MMHG | SYSTOLIC BLOOD PRESSURE: 133 MMHG | HEART RATE: 80 BPM | OXYGEN SATURATION: 100 % | HEIGHT: 68 IN

## 2020-05-11 DIAGNOSIS — E11.9 TYPE 2 DIABETES MELLITUS WITHOUT COMPLICATION, WITHOUT LONG-TERM CURRENT USE OF INSULIN: ICD-10-CM

## 2020-05-11 DIAGNOSIS — L20.9 ATOPIC DERMATITIS, UNSPECIFIED TYPE: Primary | ICD-10-CM

## 2020-05-11 PROCEDURE — 99214 PR OFFICE/OUTPT VISIT, EST, LEVL IV, 30-39 MIN: ICD-10-PCS | Mod: S$PBB,,, | Performed by: NURSE PRACTITIONER

## 2020-05-11 PROCEDURE — 99214 OFFICE O/P EST MOD 30 MIN: CPT | Mod: PBBFAC,PO | Performed by: NURSE PRACTITIONER

## 2020-05-11 PROCEDURE — 99999 PR PBB SHADOW E&M-EST. PATIENT-LVL IV: CPT | Mod: PBBFAC,,, | Performed by: NURSE PRACTITIONER

## 2020-05-11 PROCEDURE — 99999 PR PBB SHADOW E&M-EST. PATIENT-LVL IV: ICD-10-PCS | Mod: PBBFAC,,, | Performed by: NURSE PRACTITIONER

## 2020-05-11 PROCEDURE — 99214 OFFICE O/P EST MOD 30 MIN: CPT | Mod: S$PBB,,, | Performed by: NURSE PRACTITIONER

## 2020-05-11 RX ORDER — METHYLPREDNISOLONE 4 MG/1
TABLET ORAL
Qty: 21 TABLET | Refills: 0 | Status: SHIPPED | OUTPATIENT
Start: 2020-05-11 | End: 2020-09-03

## 2020-05-11 RX ORDER — TRIAMCINOLONE ACETONIDE 1 MG/G
CREAM TOPICAL 2 TIMES DAILY
Qty: 45 G | Refills: 0 | Status: SHIPPED | OUTPATIENT
Start: 2020-05-11 | End: 2021-10-19

## 2020-05-11 NOTE — PATIENT INSTRUCTIONS
Self-Care for Skin Rashes     Pat your skin dry. Do not rub.     When your skin reacts to a substance your body is sensitive to, it can cause a rash. You can treat most rashes at home by keeping the skin clean and dry. Many rashes may get better on their own within 2 to 3 days. You may need medical attention if your rash itches, drains, or hurts, particularly if the rash is getting worse.  What can cause a skin rash?  · Sun poisoning, caused by too much exposure to the sun  · An irritant or allergic reaction to a certain type of food, plant, or chemical, such as  shellfish, poison ivy, and or cleaning products  · An infection caused by a fungus (ringworm), virus (chickenpox), or bacteria (strep)  · Bites or infestation caused by insects or pests, such as ticks, lice, or mites  · Dry skin, which is often seen during the winter months and in older people  How can I control itching and skin damage?  · Take soothing lukewarm baths in a colloidal oatmeal product. You can buy this at the [employer].  · Do your best not to scratch. Clip fingernails short, especially in young children, to reduce skin damage if scratching does occur.  · Use moisturizing skin lotion instead of scratching your dry skin.  · Use sunscreen whenever going out into direct sun.  · Use only mild cleansing agents whenever possible.  · Wash with mild, nonirritating soap and warm water.  · Wear clothing that breathes, such as cotton shirts or canvas shoes.  · If fluid is seeping from the rash, cover it loosely with clean gauze to absorb the discharge.  · Many rashes are contagious. Prevent the rash from spreading to others by washing your hands often before or after touching others with any skin rash.  Use medicine  · Antihistamines such as diphenhydramine can help control itching. But use with caution because they can make you drowsy.  · Using over-the-counter hydrocortisone cream on small rashes may help reduce swelling and itching  · Most  over-the-counter antifungal medicines can treat athletes foot and many other fungal infections of the skin.  Check with your healthcare provider  Call your healthcare provider if:  · You were told that you have a fungal infection on your skin to make sure you have the correct type of medicine.  · You have questions or concerns about medicines or their side effects.     Call 911  Call 911 if either of these occur:  · Your tongue or lips start to swell  · You have difficulty breathing      Call your healthcare provider  Call your healthcare provider if any of these occur:  · Temperature of more than 101.0°F (38.3°C), or as directed  · Sore throat, a cough, or unusual fatigue  · Red, oozy, or painful rash gets worse. These are signs of infection.  · Rash covers your face, genitals, or most of your body  · Crusty sores or red rings that begin to spread  · You were exposed to someone who has a contagious rash, such as scabies or lice.  · Red bulls-eye rash with a white center (a sign of Lyme disease)  · You were told that you have resistant bacteria (MRSA) on your skin.   Date Last Reviewed: 5/12/2015  © 7648-1262 [employer]. [address] [place] [address]. All rights reserved. This information is not intended as a substitute for professional medical care. Always follow your healthcare professional's instructions.        Nonspecific Dermatitis  Dermatitis is a skin rash caused by something that touches the skin and makes it irritated and inflamed.  Your skin may be red, swollen, dry, and may be cracked. Blisters may form and ooze. The rash will itch.  Dermatitis can form on the face and neck, backs of hands, forearms, genitals, and lower legs. Dermatitis is not passed from person to person.  Talk with your health care provider about what may have caused the rash. Common things that cause skin allergies are metal in jewelry, plants like poison ivy or poison oak, and certain skin care products.  You will need to avoid the source of your rash in the future to prevent it from coming back. In some cases, the cause of the dermatitis may not be found.  Treatment is done to relieve itching and prevent the rash from coming back. The rash should go away in a few days to a few weeks.  Home care  The health care provider may prescribe medications to relieve swelling and itching. Follow all instructions when using these medications.  · Avoid anything that heats up your skin, such as hot showers or baths, or direct sunlight. This can make itching worse.  · Stay away from whatever you think caused the rash.  · Bathe in warm, not hot, water. Apply a moisturizing lotion after bathing to prevent dry skin.  · Avoid skin irritants such as wool or silk clothing, grease, oils, harsh soaps, and detergents.  · Apply cold compresses to soothe your sores to help relieve your symptoms. Do this for 30 minutes 3 to 4 times a day. You can make a cold compress by soaking a cloth in cold water. Squeeze out excess water. You can add colloidal oatmeal to the water to help reduce itching. For severe itching in a small area, apply an ice pack wrapped in a thin towel. Do this for 20 minutes 3 to 4 times a day.  · You can also help relieve large areas of itching by taking a lukewarm bath with colloidal oatmeal added to the water.  · Use hydrocortisone cream for redness and irritation, unless another medicine was prescribed. You can also use benzocaine anesthetic cream or spray.  · Use oral diphenhydramine to help reduce itching. This is an antihistamine you can buy at drug and grocery stores. It can make you sleepy, so use lower doses during the daytime. Or you can use loratadine. This is an antihistamine that will not make you sleepy. Dont use diphenhydramine if you have glaucoma or have trouble urinating because of an enlarged prostate.  · Wash your hands or use an antibacterial gel often to prevent the spread of the rash.  Follow-up  care  Follow up with your health care provider. Make an appointment with your health care provider if your symptoms do not get better in the next 1 to 2 weeks.  When to seek medical advice  Call your health care provider right away if any of these occur:  · Spreading of the rash to other parts of your body  · Severe swelling of your face, eyelids, mouth, throat or tongue  · Trouble urinating due to swelling in the genital area  · Fever of 100.4°F (38°C) or higher  · Redness or swelling that gets worse  · Pain that gets worse  · Foul-smelling fluid leaking from the skin  · Yellow-brown crusts on the open blisters  · Joint pain   Date Last Reviewed: 7/23/2014  © 2552-9026 [employer]. [address] [place] [address], [place] [address]. All rights reserved. This information is not intended as a substitute for professional medical care. Always follow your healthcare professional's instructions.        Managing Atopic Dermatitis (Eczema)     After bathing, gently pat your skin dry (dont rub). Apply moisturizer while your skin is still damp.   To manage your symptoms and help reduce the severity and frequency, try these self-care tips:  Caring for your skin  · Use a gentle, fragrance-free cleanser (or nonsoap cleanser) for bathing. Rinse well. Pat skin dry.  · Take warm, not hot, baths or showers. Try to limit them to no more that 10 to 15 minutes.   · Use moisturizer liberally right after you bathe, while your skin is still damp.  · Avoid scratching because it will cause more damage to your skin.   · Topical, over-the-counter hydrocortisone cream may help control mild symptoms.   Controlling your environment  · Avoid extreme heat or cold.  · Avoid very humid or very dry air.  · If your home or office air is very dry, use a humidifier.  · Avoid allergens, such as dust, that may be present in bedding, carpets, plush toys, or rugs.  · Know that pet hair and dander can cause flare-ups.  Seeking medical  treatment  Another way to keep symptoms under control is to seek medical treatment. Talk with your healthcare provider about the type of treatment that may work best for you. Your provider may prescribe treatments such as the following:  · Topical treatments to put on the skin daily  · Medicines taken by mouth (oral medicines), such as antihistamines, antibiotics, or corticosteroids  · In severe cases shots (injections) may be needed to control the symptoms. You may even need antibiotics if skin infections occur.  Treatments dont work the same way for every person. So if your symptoms continue or get worse, ask your healthcare provider about other treatments.  Making lifestyle choices  · Manage the stress in your life.  · Wear loose-fitting cotton clothing that does not bind or rub your skin.  · Avoid contact with wool or other scratchy fabrics.  · Use fragrance-free products.  Getting good results  Now that you know more about atopic dermatitis, the next step is up to you. Follow your healthcare providers treatment plan and your self-care routine. This will help bring atopic dermatitis under control. If your symptoms persist, be sure to let your health care provider know.   Date Last Reviewed: 2/1/2017  © 1342-4193 [employer]. [address] [place] [address], [place] [address]. All rights reserved. This information is not intended as a substitute for professional medical care. Always follow your healthcare professional's instructions.

## 2020-05-11 NOTE — TELEPHONE ENCOUNTER
----- Message from Yaima Peters sent at 5/11/2020  8:16 AM CDT -----  Contact: SELF/[phone]  Type:  Patient Returning Call    Who Called:Marcie Geiger  Who Left Message for Patient:nurse  Does the patient know what this is regarding?:yes  Would the patient rather a call back or a response via [facility]? Call back   Best Call Back Number:[phone]  Additional Information:

## 2020-05-12 NOTE — ASSESSMENT & PLAN NOTE
PO and topical steroids since there is facial involvement. Advised pt that the steroid should not be used on face, and she may use OTC moisturizers and low potency hydrocortisone for facial itching/rash.   Avoid hot water/showers.   Avoid triggers if you begin to notice a pattern.

## 2020-05-12 NOTE — ASSESSMENT & PLAN NOTE
DM well controlled. OK with giving PO steroids since there is facial/orbital involvement. Advised pt that steroids may cause increase in blood glucose over the next week.

## 2020-05-19 RX ORDER — PEN NEEDLE, DIABETIC 31 GX3/16"
NEEDLE, DISPOSABLE MISCELLANEOUS
Qty: 100 EACH | Refills: 1 | Status: SHIPPED | OUTPATIENT
Start: 2020-05-19 | End: 2020-07-13 | Stop reason: SDUPTHER

## 2020-06-08 ENCOUNTER — TELEPHONE (OUTPATIENT)
Dept: INTERNAL MEDICINE | Facility: CLINIC | Age: 50
End: 2020-06-08

## 2020-06-08 NOTE — TELEPHONE ENCOUNTER
----- Message from Devan Flores sent at 6/8/2020 12:51 PM CDT -----  Contact: pt [phone]  Type:  Sooner Apoointment Request    Caller is requesting a sooner appointment.  Caller declined first available appointment listed below.  Caller will not accept being placed on the waitlist and is requesting a message be sent to doctor.  Name of Caller:.anm   When is the first available appointment?2020  Symptoms:follow up  Would the patient rather a call back or a response via [facility]? Call back  Best Call Back Number:[phone]  Additional Information: needs possible lab orders

## 2020-06-08 NOTE — TELEPHONE ENCOUNTER
Patient called in wanting to know if she was due for lab work. She states that she has an up coming appointment (nurse visit) 6/11/2020 for her depo injection and states that she can do her labs then. Pt advised that Dr. Beavers is off on today & verbally understood. She was made aware that on tomorrow Dr. Beavers would get the message and verbally understood.

## 2020-06-09 ENCOUNTER — TELEPHONE (OUTPATIENT)
Dept: INTERNAL MEDICINE | Facility: CLINIC | Age: 50
End: 2020-06-09

## 2020-06-09 DIAGNOSIS — E11.9 TYPE 2 DIABETES MELLITUS WITHOUT COMPLICATION, WITHOUT LONG-TERM CURRENT USE OF INSULIN: Primary | ICD-10-CM

## 2020-06-09 NOTE — TELEPHONE ENCOUNTER
Patient sent over a My Chart message on yesterday wanting to know if she needs to have lab work done. She has an appointment on 6/11/2020 for her depo injection & is wanting to have her labs done then if possible. LOV: 05/11/2020

## 2020-06-11 ENCOUNTER — LAB VISIT (OUTPATIENT)
Dept: LAB | Facility: HOSPITAL | Age: 50
End: 2020-06-11
Attending: FAMILY MEDICINE
Payer: MEDICAID

## 2020-06-11 ENCOUNTER — CLINICAL SUPPORT (OUTPATIENT)
Dept: INTERNAL MEDICINE | Facility: CLINIC | Age: 50
End: 2020-06-11
Payer: MEDICAID

## 2020-06-11 VITALS
RESPIRATION RATE: 18 BRPM | HEIGHT: 68 IN | BODY MASS INDEX: 36.82 KG/M2 | SYSTOLIC BLOOD PRESSURE: 130 MMHG | HEART RATE: 68 BPM | DIASTOLIC BLOOD PRESSURE: 76 MMHG | WEIGHT: 242.94 LBS | OXYGEN SATURATION: 98 %

## 2020-06-11 DIAGNOSIS — E11.9 TYPE 2 DIABETES MELLITUS WITHOUT COMPLICATION, WITHOUT LONG-TERM CURRENT USE OF INSULIN: ICD-10-CM

## 2020-06-11 LAB
ALBUMIN SERPL BCP-MCNC: 3.8 G/DL (ref 3.5–5.2)
ALP SERPL-CCNC: 69 U/L (ref 55–135)
ALT SERPL W/O P-5'-P-CCNC: 19 U/L (ref 10–44)
ANION GAP SERPL CALC-SCNC: 8 MMOL/L (ref 8–16)
AST SERPL-CCNC: 21 U/L (ref 10–40)
BILIRUB SERPL-MCNC: 0.4 MG/DL (ref 0.1–1)
BUN SERPL-MCNC: 7 MG/DL (ref 6–20)
CALCIUM SERPL-MCNC: 9.1 MG/DL (ref 8.7–10.5)
CHLORIDE SERPL-SCNC: 107 MMOL/L (ref 95–110)
CHOLEST SERPL-MCNC: 115 MG/DL (ref 120–199)
CHOLEST/HDLC SERPL: 3.6 {RATIO} (ref 2–5)
CO2 SERPL-SCNC: 25 MMOL/L (ref 23–29)
CREAT SERPL-MCNC: 0.8 MG/DL (ref 0.5–1.4)
EST. GFR  (AFRICAN AMERICAN): >60 ML/MIN/1.73 M^2
EST. GFR  (NON AFRICAN AMERICAN): >60 ML/MIN/1.73 M^2
GLUCOSE SERPL-MCNC: 96 MG/DL (ref 70–110)
HDLC SERPL-MCNC: 32 MG/DL (ref 40–75)
HDLC SERPL: 27.8 % (ref 20–50)
LDLC SERPL CALC-MCNC: 67.2 MG/DL (ref 63–159)
NONHDLC SERPL-MCNC: 83 MG/DL
POTASSIUM SERPL-SCNC: 3.9 MMOL/L (ref 3.5–5.1)
PROT SERPL-MCNC: 7.8 G/DL (ref 6–8.4)
SODIUM SERPL-SCNC: 140 MMOL/L (ref 136–145)
TRIGL SERPL-MCNC: 79 MG/DL (ref 30–150)

## 2020-06-11 PROCEDURE — 83036 HEMOGLOBIN GLYCOSYLATED A1C: CPT

## 2020-06-11 PROCEDURE — 36415 COLL VENOUS BLD VENIPUNCTURE: CPT | Mod: PO

## 2020-06-11 PROCEDURE — 80061 LIPID PANEL: CPT

## 2020-06-11 PROCEDURE — 96372 THER/PROPH/DIAG INJ SC/IM: CPT | Mod: PBBFAC,PO

## 2020-06-11 PROCEDURE — 80053 COMPREHEN METABOLIC PANEL: CPT | Mod: PO

## 2020-06-11 PROCEDURE — 99213 OFFICE O/P EST LOW 20 MIN: CPT | Mod: PBBFAC,PO,25

## 2020-06-11 PROCEDURE — 99999 PR PBB SHADOW E&M-EST. PATIENT-LVL III: CPT | Mod: PBBFAC,,,

## 2020-06-11 PROCEDURE — 82043 UR ALBUMIN QUANTITATIVE: CPT

## 2020-06-11 PROCEDURE — 99999 PR PBB SHADOW E&M-EST. PATIENT-LVL III: ICD-10-PCS | Mod: PBBFAC,,,

## 2020-06-11 RX ADMIN — MEDROXYPROGESTERONE ACETATE 150 MG: 150 INJECTION, SUSPENSION, EXTENDED RELEASE INTRAMUSCULAR at 08:06

## 2020-06-12 LAB
ALBUMIN/CREAT UR: 31.3 UG/MG (ref 0–30)
CREAT UR-MCNC: 144 MG/DL (ref 15–325)
ESTIMATED AVG GLUCOSE: 108 MG/DL (ref 68–131)
HBA1C MFR BLD HPLC: 5.4 % (ref 4–5.6)
MICROALBUMIN UR DL<=1MG/L-MCNC: 45 UG/ML

## 2020-07-06 ENCOUNTER — TELEPHONE (OUTPATIENT)
Dept: INTERNAL MEDICINE | Facility: CLINIC | Age: 50
End: 2020-07-06

## 2020-07-06 DIAGNOSIS — Z12.39 SCREENING BREAST EXAMINATION: Primary | ICD-10-CM

## 2020-07-06 NOTE — TELEPHONE ENCOUNTER
----- Message from Hanny Sharp sent at 7/6/2020  3:17 PM CDT -----  Regarding: returning a call  Contact: pt  Type:  Patient Returning Call    Who Called:pt  Who Left Message for Patient:hitesh  Does the patient know what this is regarding?:no  Would the patient rather a call back or a response via [facility]? Call back  Best Call Back Number:[phone]  Additional Information: none

## 2020-07-06 NOTE — TELEPHONE ENCOUNTER
----- Message from Wendi Jordan sent at 7/6/2020  3:03 PM CDT -----  Contact: Please call back at [phone]  Patient is returning a phone call.  Who left a message for the patient: Anna Mccauley LPN  Does patient know what this is regarding:  yes  Comments:

## 2020-07-06 NOTE — TELEPHONE ENCOUNTER
----- Message from Chiquita Richards sent at 7/6/2020  1:59 PM CDT -----  Pt is requesting an order for an mammogram. Would like to schedule an annual in few weeks.  Please call back at [phone]

## 2020-07-07 RX ORDER — CALCIUM CITRATE/VITAMIN D3 200MG-6.25
TABLET ORAL
Qty: 100 EACH | Refills: 11 | Status: SHIPPED | OUTPATIENT
Start: 2020-07-07 | End: 2020-11-11 | Stop reason: SDUPTHER

## 2020-08-27 ENCOUNTER — CLINICAL SUPPORT (OUTPATIENT)
Dept: INTERNAL MEDICINE | Facility: CLINIC | Age: 50
End: 2020-08-27
Payer: MEDICAID

## 2020-08-27 PROCEDURE — 96372 THER/PROPH/DIAG INJ SC/IM: CPT | Mod: PBBFAC,PO

## 2020-08-27 PROCEDURE — 99999 PR PBB SHADOW E&M-EST. PATIENT-LVL II: CPT | Mod: PBBFAC,,,

## 2020-08-27 PROCEDURE — 99212 OFFICE O/P EST SF 10 MIN: CPT | Mod: PBBFAC,PO

## 2020-08-27 PROCEDURE — 99999 PR PBB SHADOW E&M-EST. PATIENT-LVL II: ICD-10-PCS | Mod: PBBFAC,,,

## 2020-08-27 RX ORDER — MEDROXYPROGESTERONE ACETATE 150 MG/ML
150 INJECTION, SUSPENSION INTRAMUSCULAR ONCE
Qty: 1 ML | Refills: 0 | Status: SHIPPED | OUTPATIENT
Start: 2020-08-27 | End: 2020-08-27 | Stop reason: CLARIF

## 2020-08-27 RX ORDER — MEDROXYPROGESTERONE ACETATE 150 MG/ML
150 INJECTION, SUSPENSION INTRAMUSCULAR
Status: COMPLETED | OUTPATIENT
Start: 2020-08-27 | End: 2020-08-27

## 2020-08-27 RX ADMIN — MEDROXYPROGESTERONE ACETATE 150 MG: 150 INJECTION, SUSPENSION INTRAMUSCULAR at 08:08

## 2020-08-31 ENCOUNTER — HOSPITAL ENCOUNTER (OUTPATIENT)
Dept: RADIOLOGY | Facility: HOSPITAL | Age: 50
Discharge: HOME | End: 2020-08-31
Attending: FAMILY MEDICINE
Payer: MEDICAID

## 2020-08-31 VITALS — WEIGHT: 242.94 LBS | HEIGHT: 68 IN | BODY MASS INDEX: 36.82 KG/M2

## 2020-08-31 DIAGNOSIS — Z12.39 SCREENING BREAST EXAMINATION: ICD-10-CM

## 2020-08-31 PROCEDURE — 77067 SCR MAMMO BI INCL CAD: CPT | Mod: 26,,, | Performed by: RADIOLOGY

## 2020-08-31 PROCEDURE — 77067 MAMMO DIGITAL SCREENING BILAT WITH TOMOSYNTHESIS_CAD: ICD-10-PCS | Mod: 26,,, | Performed by: RADIOLOGY

## 2020-08-31 PROCEDURE — 77063 MAMMO DIGITAL SCREENING BILAT WITH TOMOSYNTHESIS_CAD: ICD-10-PCS | Mod: 26,,, | Performed by: RADIOLOGY

## 2020-08-31 PROCEDURE — 77063 BREAST TOMOSYNTHESIS BI: CPT | Mod: 26,,, | Performed by: RADIOLOGY

## 2020-08-31 PROCEDURE — 77067 SCR MAMMO BI INCL CAD: CPT | Mod: TC,PO

## 2020-09-03 ENCOUNTER — OFFICE VISIT (OUTPATIENT)
Dept: INTERNAL MEDICINE | Facility: CLINIC | Age: 50
End: 2020-09-03
Payer: MEDICAID

## 2020-09-03 ENCOUNTER — LAB VISIT (OUTPATIENT)
Dept: LAB | Facility: HOSPITAL | Age: 50
End: 2020-09-03
Attending: FAMILY MEDICINE
Payer: MEDICAID

## 2020-09-03 VITALS
SYSTOLIC BLOOD PRESSURE: 124 MMHG | WEIGHT: 244.06 LBS | HEIGHT: 68 IN | BODY MASS INDEX: 36.99 KG/M2 | RESPIRATION RATE: 18 BRPM | TEMPERATURE: 99 F | DIASTOLIC BLOOD PRESSURE: 82 MMHG

## 2020-09-03 DIAGNOSIS — L03.031 PARONYCHIA OF GREAT TOE, RIGHT: ICD-10-CM

## 2020-09-03 DIAGNOSIS — Z23 ENCOUNTER FOR ADMINISTRATION OF VACCINE: ICD-10-CM

## 2020-09-03 DIAGNOSIS — E11.9 TYPE 2 DIABETES MELLITUS WITHOUT COMPLICATION, WITHOUT LONG-TERM CURRENT USE OF INSULIN: Primary | ICD-10-CM

## 2020-09-03 DIAGNOSIS — E11.9 TYPE 2 DIABETES MELLITUS WITHOUT COMPLICATION, WITHOUT LONG-TERM CURRENT USE OF INSULIN: ICD-10-CM

## 2020-09-03 LAB
ALBUMIN SERPL BCP-MCNC: 3.6 G/DL (ref 3.5–5.2)
ALP SERPL-CCNC: 70 U/L (ref 55–135)
ALT SERPL W/O P-5'-P-CCNC: 25 U/L (ref 10–44)
ANION GAP SERPL CALC-SCNC: 8 MMOL/L (ref 8–16)
AST SERPL-CCNC: 21 U/L (ref 10–40)
BILIRUB SERPL-MCNC: 0.3 MG/DL (ref 0.1–1)
BUN SERPL-MCNC: 9 MG/DL (ref 6–20)
CALCIUM SERPL-MCNC: 9.3 MG/DL (ref 8.7–10.5)
CHLORIDE SERPL-SCNC: 107 MMOL/L (ref 95–110)
CO2 SERPL-SCNC: 28 MMOL/L (ref 23–29)
CREAT SERPL-MCNC: 0.8 MG/DL (ref 0.5–1.4)
EST. GFR  (AFRICAN AMERICAN): >60 ML/MIN/1.73 M^2
EST. GFR  (NON AFRICAN AMERICAN): >60 ML/MIN/1.73 M^2
GLUCOSE SERPL-MCNC: 103 MG/DL (ref 70–110)
POTASSIUM SERPL-SCNC: 4.4 MMOL/L (ref 3.5–5.1)
PROT SERPL-MCNC: 7.4 G/DL (ref 6–8.4)
SODIUM SERPL-SCNC: 143 MMOL/L (ref 136–145)

## 2020-09-03 PROCEDURE — 99214 OFFICE O/P EST MOD 30 MIN: CPT | Mod: S$PBB,,, | Performed by: FAMILY MEDICINE

## 2020-09-03 PROCEDURE — 99999 PR PBB SHADOW E&M-EST. PATIENT-LVL IV: CPT | Mod: PBBFAC,,, | Performed by: FAMILY MEDICINE

## 2020-09-03 PROCEDURE — 99214 PR OFFICE/OUTPT VISIT, EST, LEVL IV, 30-39 MIN: ICD-10-PCS | Mod: S$PBB,,, | Performed by: FAMILY MEDICINE

## 2020-09-03 PROCEDURE — 83036 HEMOGLOBIN GLYCOSYLATED A1C: CPT

## 2020-09-03 PROCEDURE — 99999 PR PBB SHADOW E&M-EST. PATIENT-LVL IV: ICD-10-PCS | Mod: PBBFAC,,, | Performed by: FAMILY MEDICINE

## 2020-09-03 PROCEDURE — 90471 IMMUNIZATION ADMIN: CPT | Mod: PBBFAC,PO

## 2020-09-03 PROCEDURE — 99214 OFFICE O/P EST MOD 30 MIN: CPT | Mod: PBBFAC,PO,25 | Performed by: FAMILY MEDICINE

## 2020-09-03 PROCEDURE — 36415 COLL VENOUS BLD VENIPUNCTURE: CPT | Mod: PO

## 2020-09-03 PROCEDURE — 80053 COMPREHEN METABOLIC PANEL: CPT | Mod: PO

## 2020-09-03 RX ORDER — DOXYCYCLINE HYCLATE 100 MG
100 TABLET ORAL EVERY 12 HOURS
Qty: 10 TABLET | Refills: 0 | Status: SHIPPED | OUTPATIENT
Start: 2020-09-03 | End: 2020-09-08

## 2020-09-03 NOTE — ASSESSMENT & PLAN NOTE
-due to duration of symptoms and little improvement with conservative measures oral antibiotics prescribed.  Did explain that if symptoms worsen or do not improve will refer to podiatry

## 2020-09-03 NOTE — ASSESSMENT & PLAN NOTE
-diabetes remains well controlled.  Continue metformin and Victoza.  Lab Results   Component Value Date    HGBA1C 5.4 06/11/2020     -pt is scheduled for eye exam this month

## 2020-09-03 NOTE — PROGRESS NOTES
" Patient ID: Marcie Geiger is a 49 y.o. female.    Chief Complaint: 3 month f/u DM and Right big toe hangnail    HPI patient is 49-year-old female who presents for 3 month diabetes follow-up.  Reports compliance with metformin and Victoza daily.  Has had very controlled A1c over the last few year.  She denies polyuria, polydipsia.      New complaint today is painful right great toe.  Eight days ago she removed hangnail.  Since then she has had swelling, pain and serosanguineous discharge from toe.  This has caused some pain with ambulation.  Has been applying hydrogen peroxide to area.  Reports no improvement. She denies fever and chills.    Family History   Problem Relation Age of Onset    Diabetes Mother     Heart disease Mother     Heart attack Father     Heart disease Father     Diabetes Brother     Hypertension Brother        Current Outpatient Medications:     atorvastatin (LIPITOR) 20 MG tablet, Take 1 tablet (20 mg total) by mouth once daily., Disp: 90 tablet, Rfl: 3    BD ULTRA-FINE MINI PEN NEEDLE 31 gauge x 3/16" Ndle, USE WITH BASAGLAR ( ONCE DAILY), Disp: 100 each, Rfl: 3    cetirizine (ZYRTEC) 10 MG tablet, TAKE ONE TABLET BY MOUTH DAILY, Disp: 30 tablet, Rfl: 2    esomeprazole (NEXIUM) 40 MG capsule, TAKE 1 CAPSULE BY MOUTH BEFORE BREAKFAST., Disp: 30 capsule, Rfl: 5    LANCETS,ULTRA THIN 26 gauge Misc, USE TO TEST 2 TIMES DAILY, Disp: , Rfl: 11    metFORMIN (GLUCOPHAGE) 500 MG tablet, Take 1 tablet (500 mg total) by mouth 2 (two) times daily with meals., Disp: 180 tablet, Rfl: 4    pen needle, diabetic (SURE COMFORT PEN NEEDLE) 31 gauge x 5/16" Ndle, Check BG daily prn, Disp: 100 each, Rfl: 1    TRUE METRIX GLUCOSE METER Misc, USE TWO TIMES A DAY, Disp: 1 each, Rfl: 0    TRUE METRIX GLUCOSE TEST STRIP Strp, TEST 2 TIMES DAILY, Disp: 100 each, Rfl: 11    TRUEPLUS LANCETS 28 gauge Misc, USE TWO TIMES A DAY, Disp: 100 each, Rfl: 0    VICTOZA 2-HERRERA 0.6 mg/0.1 mL (18 mg/3 mL) " "PnIj pen, INJECT 1.2MG UNDER THE SKIN ONCE DAILY, Disp: 6 mL, Rfl: 6    doxycycline (VIBRA-TABS) 100 MG tablet, Take 1 tablet (100 mg total) by mouth every 12 (twelve) hours. for 5 days, Disp: 10 tablet, Rfl: 0    naproxen sodium (ANAPROX) 220 MG tablet, Take 220 mg by mouth every 12 (twelve) hours., Disp: , Rfl:     triamcinolone acetonide 0.1% (KENALOG) 0.1 % cream, Apply topically 2 (two) times daily. for 14 days, Disp: 45 g, Rfl: 0    Review of Systems   Constitutional: Negative for activity change and unexpected weight change.   HENT: Negative for hearing loss, rhinorrhea and trouble swallowing.    Eyes: Negative for discharge and visual disturbance.   Respiratory: Negative for chest tightness and wheezing.    Cardiovascular: Negative for chest pain and palpitations.   Gastrointestinal: Negative for blood in stool, constipation, diarrhea and vomiting.   Endocrine: Negative for polydipsia and polyuria.   Genitourinary: Negative for difficulty urinating, dysuria, hematuria and menstrual problem.   Musculoskeletal: Negative for arthralgias, joint swelling and neck pain.   Skin: Positive for color change.   Neurological: Negative for weakness and headaches.   Psychiatric/Behavioral: Negative for confusion and dysphoric mood.       Objective:   /82 (BP Location: Left arm, Patient Position: Sitting, BP Method: Large (Manual))   Temp 98.7 °F (37.1 °C) (Temporal)   Resp 18   Ht 5' 8" (1.727 m)   Wt 110.7 kg (244 lb 0.8 oz)   BMI 37.11 kg/m²      Physical Exam  Constitutional:       Appearance: She is well-developed.   HENT:      Head: Normocephalic and atraumatic.   Eyes:      Conjunctiva/sclera: Conjunctivae normal.   Neck:      Musculoskeletal: Normal range of motion.   Cardiovascular:      Rate and Rhythm: Normal rate and regular rhythm.      Pulses: Normal pulses.      Heart sounds: Normal heart sounds.   Pulmonary:      Effort: Pulmonary effort is normal.      Breath sounds: Normal breath sounds. "   Abdominal:      Palpations: Abdomen is soft.   Musculoskeletal: Normal range of motion.   Skin:     General: Skin is warm.      Findings: Erythema present.   Neurological:      Mental Status: She is alert and oriented to person, place, and time.   Psychiatric:         Behavior: Behavior normal.        Lateral great toe ttp. No abscess appreciated     Assessment & Plan     Problem List Items Addressed This Visit        ID    Paronychia of great toe, right    Current Assessment & Plan     -due to duration of symptoms and little improvement with conservative measures oral antibiotics prescribed.  Did explain that if symptoms worsen or do not improve will refer to podiatry         Relevant Medications    doxycycline (VIBRA-TABS) 100 MG tablet       Endocrine    Type 2 diabetes mellitus without complication, without long-term current use of insulin - Primary    Current Assessment & Plan     -diabetes remains well controlled.  Continue metformin and Victoza.  Lab Results   Component Value Date    HGBA1C 5.4 06/11/2020     -pt is scheduled for eye exam this month         Relevant Orders    Comprehensive metabolic panel    Hemoglobin A1C      Other Visit Diagnoses     Encounter for administration of vaccine        Relevant Orders    Influenza - Quadrivalent (PF) (Completed)            Follow up in about 3 months (around 12/3/2020), or if symptoms worsen or fail to improve, for Follow-up Diabetes.      Disclaimer:  This note may have been prepared using voice recognition software, without extensive proofreading. As such, there could be errors within the text such as sound alike errors.

## 2020-09-04 LAB
ESTIMATED AVG GLUCOSE: 114 MG/DL (ref 68–131)
HBA1C MFR BLD HPLC: 5.6 % (ref 4–5.6)

## 2020-09-04 NOTE — PROGRESS NOTES
Please notify patient that hemoglobin A1c remains controlled.  Kidney function and electrolytes normal as well.  No changes to current medication

## 2020-09-08 ENCOUNTER — TELEPHONE (OUTPATIENT)
Dept: INTERNAL MEDICINE | Facility: CLINIC | Age: 50
End: 2020-09-08

## 2020-09-08 NOTE — TELEPHONE ENCOUNTER
PA request received from [facility] pharmacy. Completed PA on cover my meds 9/8/2020 pending response.     Called pt and informed her pending response for nexium 40 mg capsules. Asked what medications she has tried over the counter to submit to insurance she states has tried multiple otc antacids : tums, pepcid, omeprazole, ranitidine. Submitted to insurance. Pt states has been on nexium 40 mg for years and doing well on  Medication. Fc.

## 2020-09-09 NOTE — TELEPHONE ENCOUNTER
Response received from [facility] denied, unable to approve Esomeprazole 40 mg. Will inform Dr. Beavers.

## 2020-09-14 DIAGNOSIS — K21.9 GASTROESOPHAGEAL REFLUX DISEASE WITHOUT ESOPHAGITIS: Primary | ICD-10-CM

## 2020-09-14 RX ORDER — PANTOPRAZOLE SODIUM 40 MG/1
40 TABLET, DELAYED RELEASE ORAL DAILY
Qty: 30 TABLET | Refills: 5 | Status: SHIPPED | OUTPATIENT
Start: 2020-09-14 | End: 2020-09-17 | Stop reason: ALTCHOICE

## 2020-09-14 NOTE — TELEPHONE ENCOUNTER
Informed patient Nexium not covered by insurance. She is agreeable to an alternative pantoprazole to [facility]. Fc

## 2020-09-16 ENCOUNTER — TELEPHONE (OUTPATIENT)
Dept: INTERNAL MEDICINE | Facility: CLINIC | Age: 50
End: 2020-09-16

## 2020-09-16 NOTE — TELEPHONE ENCOUNTER
Returned call to patient. She states she needs to schedule her 3 month DM follow up. Informed her Dr. Beavers will no longer be working at [facility]. Offered appt with our NP Adali. She declined and wants appt with Dr. Wing. Appt scheduled for December, 2020. Fc

## 2020-09-16 NOTE — TELEPHONE ENCOUNTER
----- Message from Ale Nieves sent at 9/16/2020  1:45 PM CDT -----  Type:  Sooner Apoointment Request    Caller is requesting a sooner appointment.  Caller declined first available appointment listed below.  Caller will not accept being placed on the waitlist and is requesting a message be sent to doctor.  Name of Caller:Patient  When is the first available appointment?na  Symptoms: follow up  Would the patient rather a call back or a response [facility]? Call back  Best Call Back Number:[phone]  Additional Information: na

## 2020-09-17 ENCOUNTER — TELEPHONE (OUTPATIENT)
Dept: INTERNAL MEDICINE | Facility: CLINIC | Age: 50
End: 2020-09-17

## 2020-09-17 DIAGNOSIS — K21.9 GASTROESOPHAGEAL REFLUX DISEASE WITHOUT ESOPHAGITIS: Primary | ICD-10-CM

## 2020-09-17 RX ORDER — OMEPRAZOLE 20 MG/1
20 CAPSULE, DELAYED RELEASE ORAL DAILY
Qty: 30 CAPSULE | Refills: 11 | Status: SHIPPED | OUTPATIENT
Start: 2020-09-17 | End: 2020-11-11 | Stop reason: SDUPTHER

## 2020-09-17 NOTE — TELEPHONE ENCOUNTER
Fax from [facility]. Prior auth is needed for patients pantoprazole 40 mg. Should I do a PA or just change med? PA was done on nexium and was denied. Fc

## 2020-09-25 LAB
LEFT EYE DM RETINOPATHY: NEGATIVE
RIGHT EYE DM RETINOPATHY: NEGATIVE

## 2020-11-11 ENCOUNTER — TELEPHONE (OUTPATIENT)
Dept: INTERNAL MEDICINE | Facility: CLINIC | Age: 50
End: 2020-11-11

## 2020-11-11 DIAGNOSIS — J30.9 ALLERGIC RHINITIS, UNSPECIFIED SEASONALITY, UNSPECIFIED TRIGGER: ICD-10-CM

## 2020-11-11 DIAGNOSIS — K21.9 GASTROESOPHAGEAL REFLUX DISEASE WITHOUT ESOPHAGITIS: ICD-10-CM

## 2020-11-11 RX ORDER — CETIRIZINE HYDROCHLORIDE 10 MG/1
10 TABLET ORAL DAILY
Qty: 30 TABLET | Refills: 2 | Status: SHIPPED | OUTPATIENT
Start: 2020-11-11 | End: 2021-02-09 | Stop reason: SDUPTHER

## 2020-11-11 RX ORDER — MEDROXYPROGESTERONE ACETATE 150 MG/ML
150 INJECTION, SUSPENSION INTRAMUSCULAR
Status: COMPLETED | OUTPATIENT
Start: 2020-11-12 | End: 2020-11-12

## 2020-11-11 RX ORDER — PEN NEEDLE, DIABETIC 30 GX3/16"
NEEDLE, DISPOSABLE MISCELLANEOUS
Qty: 100 EACH | Refills: 3 | Status: SHIPPED | OUTPATIENT
Start: 2020-11-11 | End: 2021-04-12

## 2020-11-11 RX ORDER — ATORVASTATIN CALCIUM 20 MG/1
20 TABLET, FILM COATED ORAL DAILY
Qty: 90 TABLET | Refills: 3 | Status: SHIPPED | OUTPATIENT
Start: 2020-11-11 | End: 2021-08-05

## 2020-11-11 RX ORDER — PEN NEEDLE, DIABETIC 30 GX3/16"
NEEDLE, DISPOSABLE MISCELLANEOUS
Qty: 100 EACH | Refills: 11 | Status: SHIPPED | OUTPATIENT
Start: 2020-11-11 | End: 2021-11-16

## 2020-11-11 RX ORDER — CALCIUM CITRATE/VITAMIN D3 200MG-6.25
TABLET ORAL
Qty: 100 EACH | Refills: 11 | Status: SHIPPED | OUTPATIENT
Start: 2020-11-11 | End: 2021-03-08

## 2020-11-11 RX ORDER — OMEPRAZOLE 20 MG/1
20 CAPSULE, DELAYED RELEASE ORAL DAILY
Qty: 30 CAPSULE | Refills: 11 | Status: SHIPPED | OUTPATIENT
Start: 2020-11-11 | End: 2021-04-15 | Stop reason: SDUPTHER

## 2020-11-11 RX ORDER — DEXTROSE 4 G
TABLET,CHEWABLE ORAL
Qty: 1 EACH | Refills: 0 | Status: SHIPPED | OUTPATIENT
Start: 2020-11-11

## 2020-11-11 RX ORDER — METFORMIN HYDROCHLORIDE 500 MG/1
500 TABLET ORAL 2 TIMES DAILY WITH MEALS
Qty: 180 TABLET | Refills: 4 | Status: SHIPPED | OUTPATIENT
Start: 2020-11-11 | End: 2021-10-12

## 2020-11-11 RX ORDER — LANCETS
EACH MISCELLANEOUS
Qty: 100 EACH | Refills: 11 | Status: SHIPPED | OUTPATIENT
Start: 2020-11-11 | End: 2021-03-08

## 2020-11-11 RX ORDER — LANCETS 28 GAUGE
EACH MISCELLANEOUS
Qty: 100 EACH | Refills: 0 | Status: SHIPPED | OUTPATIENT
Start: 2020-11-11 | End: 2021-03-08

## 2020-11-11 RX ORDER — LIRAGLUTIDE 6 MG/ML
INJECTION SUBCUTANEOUS
Qty: 6 ML | Refills: 6 | Status: SHIPPED | OUTPATIENT
Start: 2020-11-11 | End: 2021-05-11 | Stop reason: SDUPTHER

## 2020-11-11 NOTE — TELEPHONE ENCOUNTER
----- Message from Janae Cavazos sent at 11/11/2020  9:17 AM CST -----  Contact: [phone]/SELF  Type:  Needs Medical Advice    Who Called: Patient  Symptoms (please be specific):    How long has patient had these symptoms:    Pharmacy name and phone #:        Would the patient rather a call back or a response via [facility]? Call Back  Best Call Back Number:[phone]   Additional Information: Patient would like to have prescription transferred to: Fax the Prescription to [facility]  [address] [place] [address] [phone]        Thanks/DERRICK

## 2020-11-11 NOTE — TELEPHONE ENCOUNTER
----- Message from Tiff Felder sent at 11/11/2020  9:30 AM CST -----  Contact: pt  Type:  Patient Returning Call    Who Called:Marcie  Who Left Message for Patient:Lyn  Does the patient know what this is regarding?:  Would the patient rather a call back or a response via [facility]? call  Best Call Back Number:[phone]  Additional Information:

## 2020-11-12 ENCOUNTER — CLINICAL SUPPORT (OUTPATIENT)
Dept: INTERNAL MEDICINE | Facility: CLINIC | Age: 50
End: 2020-11-12
Payer: MEDICAID

## 2020-11-12 PROCEDURE — 99999 PR PBB SHADOW E&M-EST. PATIENT-LVL II: CPT | Mod: PBBFAC,,,

## 2020-11-12 PROCEDURE — 96372 THER/PROPH/DIAG INJ SC/IM: CPT | Mod: PBBFAC,PO

## 2020-11-12 PROCEDURE — 99212 OFFICE O/P EST SF 10 MIN: CPT | Mod: PBBFAC,PO,25

## 2020-11-12 PROCEDURE — 99999 PR PBB SHADOW E&M-EST. PATIENT-LVL II: ICD-10-PCS | Mod: PBBFAC,,,

## 2020-11-12 RX ADMIN — MEDROXYPROGESTERONE ACETATE 150 MG: 150 INJECTION, SUSPENSION INTRAMUSCULAR at 08:11

## 2020-11-16 ENCOUNTER — TELEPHONE (OUTPATIENT)
Dept: INTERNAL MEDICINE | Facility: CLINIC | Age: 50
End: 2020-11-16

## 2020-11-16 NOTE — TELEPHONE ENCOUNTER
----- Message from Ruiz Vega sent at 11/16/2020 11:31 AM CST -----  Regarding: pt  Pt would like a call from nurse Nicholson in regards to her pharmacy ,. Please call back at.[phone] (home)         Thank You ,   Ruiz Vega

## 2020-12-07 ENCOUNTER — OFFICE VISIT (OUTPATIENT)
Dept: INTERNAL MEDICINE | Facility: CLINIC | Age: 50
End: 2020-12-07
Payer: MEDICAID

## 2020-12-07 ENCOUNTER — PATIENT OUTREACH (OUTPATIENT)
Dept: ADMINISTRATIVE | Facility: HOSPITAL | Age: 50
End: 2020-12-07

## 2020-12-07 VITALS
WEIGHT: 244.25 LBS | OXYGEN SATURATION: 98 % | TEMPERATURE: 99 F | HEIGHT: 68 IN | DIASTOLIC BLOOD PRESSURE: 86 MMHG | BODY MASS INDEX: 37.02 KG/M2 | SYSTOLIC BLOOD PRESSURE: 136 MMHG | RESPIRATION RATE: 18 BRPM | HEART RATE: 95 BPM

## 2020-12-07 DIAGNOSIS — I10 ESSENTIAL HYPERTENSION: ICD-10-CM

## 2020-12-07 DIAGNOSIS — E11.9 TYPE 2 DIABETES MELLITUS WITHOUT COMPLICATION, WITHOUT LONG-TERM CURRENT USE OF INSULIN: Primary | ICD-10-CM

## 2020-12-07 DIAGNOSIS — H10.12 ACUTE ATOPIC CONJUNCTIVITIS OF LEFT EYE: ICD-10-CM

## 2020-12-07 PROBLEM — Z12.4 CERVICAL CANCER SCREENING: Status: RESOLVED | Noted: 2019-09-19 | Resolved: 2020-12-07

## 2020-12-07 PROBLEM — L02.419 ABSCESS OF AXILLA: Status: RESOLVED | Noted: 2020-01-28 | Resolved: 2020-12-07

## 2020-12-07 PROBLEM — S93.509A TOE SPRAIN, INITIAL ENCOUNTER: Status: RESOLVED | Noted: 2020-02-14 | Resolved: 2020-12-07

## 2020-12-07 PROCEDURE — 99999 PR PBB SHADOW E&M-EST. PATIENT-LVL IV: CPT | Mod: PBBFAC,,, | Performed by: FAMILY MEDICINE

## 2020-12-07 PROCEDURE — 99214 PR OFFICE/OUTPT VISIT, EST, LEVL IV, 30-39 MIN: ICD-10-PCS | Mod: S$PBB,,, | Performed by: FAMILY MEDICINE

## 2020-12-07 PROCEDURE — 99214 OFFICE O/P EST MOD 30 MIN: CPT | Mod: S$PBB,,, | Performed by: FAMILY MEDICINE

## 2020-12-07 PROCEDURE — 99999 PR PBB SHADOW E&M-EST. PATIENT-LVL IV: ICD-10-PCS | Mod: PBBFAC,,, | Performed by: FAMILY MEDICINE

## 2020-12-07 PROCEDURE — 99214 OFFICE O/P EST MOD 30 MIN: CPT | Mod: PBBFAC,PO | Performed by: FAMILY MEDICINE

## 2020-12-07 RX ORDER — AZELASTINE HYDROCHLORIDE 0.5 MG/ML
1 SOLUTION/ DROPS OPHTHALMIC 2 TIMES DAILY
Qty: 4 ML | Refills: 11 | Status: SHIPPED | OUTPATIENT
Start: 2020-12-07 | End: 2022-09-26

## 2020-12-07 NOTE — PROGRESS NOTES
Called [facility] [place] in [place] requested eye exam be faxed over. Provided staff with fax number stated will send over shortly.

## 2020-12-07 NOTE — PROGRESS NOTES
Subjective:       Patient ID: Marcie Geiger is a 49 y.o. female.    Chief Complaint: Follow-up (3 month dm)    HPI   Here today for routine 3 month follow-up on diabetes.  Has maintained things well controlled.  Up-to-date on eye exam which she had a [facility].  She is having a little bit of irritation of her left eye.  Has a history of keratoacanthoma.  Has been applying topical saline eyedrops without much relief.    Diabetes Management Status    Statin: Taking  ACE/ARB: Not taking    Screening or Prevention Patient's value Goal Complete/Controlled?   HgA1C Testing and Control   Lab Results   Component Value Date    HGBA1C 5.6 09/03/2020      Annually/Less than 8% Yes   Lipid profile : 06/11/2020 Annually Yes   LDL control Lab Results   Component Value Date    LDLCALC 67.2 06/11/2020    Annually/Less than 100 mg/dl  Yes   Nephropathy screening Lab Results   Component Value Date    LABMICR 45.0 06/11/2020     Lab Results   Component Value Date    PROTEINUA Negative 09/04/2018    Annually Yes   Blood pressure BP Readings from Last 1 Encounters:   12/07/20 136/86    Less than 140/90 Yes   Dilated retinal exam : 09/25/2019 Annually No   Foot exam   : 02/14/2020 Annually Yes       Family History   Problem Relation Age of Onset    Diabetes Mother     Heart disease Mother     Heart attack Father     Heart disease Father     Diabetes Brother     Hypertension Brother        Current Outpatient Medications:     atorvastatin (LIPITOR) 20 MG tablet, Take 1 tablet (20 mg total) by mouth once daily., Disp: 90 tablet, Rfl: 3    blood-glucose meter (TRUE METRIX GLUCOSE METER) Misc, USE TWO TIMES A DAY, Disp: 1 each, Rfl: 0    cetirizine (ZYRTEC) 10 MG tablet, Take 1 tablet (10 mg total) by mouth once daily., Disp: 30 tablet, Rfl: 2    lancets (TRUEPLUS LANCETS) 28 gauge Misc, USE TWO TIMES A DAY, Disp: 100 each, Rfl: 0    LANCETS,ULTRA THIN 26 gauge Misc, USE TO TEST 2 TIMES DAILY, Disp: 100 each, Rfl:  "11    liraglutide 0.6 mg/0.1 mL, 18 mg/3 mL, subq PNIJ (VICTOZA 2-HERRERA) 0.6 mg/0.1 mL (18 mg/3 mL) PnIj pen, INJECT 1.2MG UNDER THE SKIN ONCE DAILY, Disp: 6 mL, Rfl: 6    metFORMIN (GLUCOPHAGE) 500 MG tablet, Take 1 tablet (500 mg total) by mouth 2 (two) times daily with meals., Disp: 180 tablet, Rfl: 4    naproxen sodium (ANAPROX) 220 MG tablet, Take 220 mg by mouth every 12 (twelve) hours., Disp: , Rfl:     omeprazole (PRILOSEC) 20 MG capsule, Take 1 capsule (20 mg total) by mouth once daily., Disp: 30 capsule, Rfl: 11    pen needle, diabetic (BD ULTRA-FINE MINI PEN NEEDLE) 31 gauge x 3/16" Ndle, USE WITH BASAGLAR ( ONCE DAILY), Disp: 100 each, Rfl: 3    pen needle, diabetic (SURE COMFORT PEN NEEDLE) 31 gauge x 5/16" Ndle, USE ONCE A DAY WITH BASAGLAR, Disp: 100 each, Rfl: 11    TRUE METRIX GLUCOSE TEST STRIP Strp, TEST 2 TIMES DAILY, Disp: 100 each, Rfl: 11    azelastine (OPTIVAR) 0.05 % ophthalmic solution, Place 1 drop into the left eye 2 (two) times daily., Disp: 4 mL, Rfl: 11    triamcinolone acetonide 0.1% (KENALOG) 0.1 % cream, Apply topically 2 (two) times daily. for 14 days, Disp: 45 g, Rfl: 0    Review of Systems   Constitutional: Negative for chills and fever.   Respiratory: Negative for cough and shortness of breath.    Cardiovascular: Negative for chest pain.   Gastrointestinal: Negative for abdominal pain.   Skin: Negative for rash.   Neurological: Negative for dizziness.       Objective:   /86   Pulse 95   Temp 98.8 °F (37.1 °C)   Resp 18   Ht 5' 8" (1.727 m)   Wt 110.8 kg (244 lb 4.3 oz)   SpO2 98%   BMI 37.14 kg/m²      Physical Exam  Vitals signs reviewed.   Constitutional:       Appearance: She is well-developed.   HENT:      Head: Normocephalic and atraumatic.   Eyes:      Conjunctiva/sclera: Conjunctivae normal.   Cardiovascular:      Rate and Rhythm: Normal rate.   Pulmonary:      Effort: Pulmonary effort is normal. No respiratory distress.   Skin:     General: Skin is " warm and dry.      Findings: No rash.   Neurological:      Mental Status: She is alert and oriented to person, place, and time.      Coordination: Coordination normal.   Psychiatric:         Behavior: Behavior normal.         Assessment & Plan     Problem List Items Addressed This Visit        Ophtho    Acute atopic conjunctivitis of left eye    Current Assessment & Plan     Advised if worsening/not improving go to optometry            Cardiac/Vascular    Essential hypertension    Current Assessment & Plan     At goal without meds. Continue monitoring.            Endocrine    Type 2 diabetes mellitus without complication, without long-term current use of insulin - Primary    Current Assessment & Plan     Well controlled. Continue f/u every 6 mth         Relevant Orders    Hemoglobin A1C    Comprehensive Metabolic Panel            Immunizations Administered on Date of Encounter - 12/7/2020     No immunizations on file.           Follow up in about 3 months (around 3/7/2021).    Disclaimer:  This note may have been prepared using voice recognition software, it may have not been extensively proofed, as such there could be errors within the text such as sound alike errors.

## 2020-12-11 ENCOUNTER — PATIENT MESSAGE (OUTPATIENT)
Dept: OTHER | Facility: OTHER | Age: 50
End: 2020-12-11

## 2021-01-25 ENCOUNTER — PATIENT OUTREACH (OUTPATIENT)
Dept: ADMINISTRATIVE | Facility: HOSPITAL | Age: 51
End: 2021-01-25

## 2021-01-28 ENCOUNTER — CLINICAL SUPPORT (OUTPATIENT)
Dept: INTERNAL MEDICINE | Facility: CLINIC | Age: 51
End: 2021-01-28
Payer: MEDICAID

## 2021-01-28 ENCOUNTER — TELEPHONE (OUTPATIENT)
Dept: INTERNAL MEDICINE | Facility: CLINIC | Age: 51
End: 2021-01-28

## 2021-01-28 DIAGNOSIS — Z30.42 ENCOUNTER FOR DEPO-PROVERA CONTRACEPTION: Primary | ICD-10-CM

## 2021-01-28 PROCEDURE — 99999 PR PBB SHADOW E&M-EST. PATIENT-LVL II: ICD-10-PCS | Mod: PBBFAC,,,

## 2021-01-28 PROCEDURE — 96372 THER/PROPH/DIAG INJ SC/IM: CPT | Mod: PBBFAC,PO

## 2021-01-28 PROCEDURE — 99999 PR PBB SHADOW E&M-EST. PATIENT-LVL II: CPT | Mod: PBBFAC,,,

## 2021-01-28 PROCEDURE — 99212 OFFICE O/P EST SF 10 MIN: CPT | Mod: PBBFAC,PO,25

## 2021-01-28 RX ORDER — MEDROXYPROGESTERONE ACETATE 150 MG/ML
150 INJECTION, SUSPENSION INTRAMUSCULAR ONCE
Status: COMPLETED | OUTPATIENT
Start: 2021-01-28 | End: 2021-01-28

## 2021-01-28 RX ADMIN — MEDROXYPROGESTERONE ACETATE 150 MG: 150 INJECTION, SUSPENSION INTRAMUSCULAR at 08:01

## 2021-02-09 DIAGNOSIS — J30.9 ALLERGIC RHINITIS, UNSPECIFIED SEASONALITY, UNSPECIFIED TRIGGER: ICD-10-CM

## 2021-02-09 RX ORDER — CETIRIZINE HYDROCHLORIDE 10 MG/1
10 TABLET ORAL DAILY
Qty: 30 TABLET | Refills: 2 | Status: SHIPPED | OUTPATIENT
Start: 2021-02-09 | End: 2021-05-10

## 2021-02-24 ENCOUNTER — TELEPHONE (OUTPATIENT)
Dept: INTERNAL MEDICINE | Facility: CLINIC | Age: 51
End: 2021-02-24

## 2021-03-08 ENCOUNTER — OFFICE VISIT (OUTPATIENT)
Dept: INTERNAL MEDICINE | Facility: CLINIC | Age: 51
End: 2021-03-08
Payer: MEDICAID

## 2021-03-08 ENCOUNTER — LAB VISIT (OUTPATIENT)
Dept: LAB | Facility: HOSPITAL | Age: 51
End: 2021-03-08
Attending: FAMILY MEDICINE
Payer: MEDICAID

## 2021-03-08 VITALS
OXYGEN SATURATION: 99 % | TEMPERATURE: 99 F | HEIGHT: 68 IN | RESPIRATION RATE: 18 BRPM | SYSTOLIC BLOOD PRESSURE: 134 MMHG | DIASTOLIC BLOOD PRESSURE: 72 MMHG | HEART RATE: 83 BPM | WEIGHT: 247.56 LBS | BODY MASS INDEX: 37.52 KG/M2

## 2021-03-08 DIAGNOSIS — E11.9 TYPE 2 DIABETES MELLITUS WITHOUT COMPLICATION, WITHOUT LONG-TERM CURRENT USE OF INSULIN: ICD-10-CM

## 2021-03-08 DIAGNOSIS — Z23 NEED FOR SHINGLES VACCINE: ICD-10-CM

## 2021-03-08 DIAGNOSIS — Z12.11 COLON CANCER SCREENING: ICD-10-CM

## 2021-03-08 DIAGNOSIS — I10 ESSENTIAL HYPERTENSION: Primary | ICD-10-CM

## 2021-03-08 DIAGNOSIS — E66.01 SEVERE OBESITY (BMI 35.0-39.9) WITH COMORBIDITY: ICD-10-CM

## 2021-03-08 PROBLEM — L03.031 PARONYCHIA OF GREAT TOE, RIGHT: Status: RESOLVED | Noted: 2020-09-03 | Resolved: 2021-03-08

## 2021-03-08 PROBLEM — H10.12 ACUTE ATOPIC CONJUNCTIVITIS OF LEFT EYE: Status: RESOLVED | Noted: 2020-12-07 | Resolved: 2021-03-08

## 2021-03-08 LAB
ALBUMIN SERPL BCP-MCNC: 3.7 G/DL (ref 3.5–5.2)
ALP SERPL-CCNC: 79 U/L (ref 55–135)
ALT SERPL W/O P-5'-P-CCNC: 19 U/L (ref 10–44)
ANION GAP SERPL CALC-SCNC: 9 MMOL/L (ref 8–16)
AST SERPL-CCNC: 18 U/L (ref 10–40)
BILIRUB SERPL-MCNC: 0.3 MG/DL (ref 0.1–1)
BUN SERPL-MCNC: 10 MG/DL (ref 6–20)
CALCIUM SERPL-MCNC: 9.1 MG/DL (ref 8.7–10.5)
CHLORIDE SERPL-SCNC: 108 MMOL/L (ref 95–110)
CO2 SERPL-SCNC: 25 MMOL/L (ref 23–29)
CREAT SERPL-MCNC: 0.8 MG/DL (ref 0.5–1.4)
EST. GFR  (AFRICAN AMERICAN): >60 ML/MIN/1.73 M^2
EST. GFR  (NON AFRICAN AMERICAN): >60 ML/MIN/1.73 M^2
GLUCOSE SERPL-MCNC: 106 MG/DL (ref 70–110)
POTASSIUM SERPL-SCNC: 4.2 MMOL/L (ref 3.5–5.1)
PROT SERPL-MCNC: 7.7 G/DL (ref 6–8.4)
SODIUM SERPL-SCNC: 142 MMOL/L (ref 136–145)

## 2021-03-08 PROCEDURE — 36415 COLL VENOUS BLD VENIPUNCTURE: CPT | Mod: PO | Performed by: FAMILY MEDICINE

## 2021-03-08 PROCEDURE — 99214 PR OFFICE/OUTPT VISIT, EST, LEVL IV, 30-39 MIN: ICD-10-PCS | Mod: S$PBB,,, | Performed by: NURSE PRACTITIONER

## 2021-03-08 PROCEDURE — 90750 HZV VACC RECOMBINANT IM: CPT | Mod: PBBFAC,PO

## 2021-03-08 PROCEDURE — 80053 COMPREHEN METABOLIC PANEL: CPT | Mod: PO | Performed by: FAMILY MEDICINE

## 2021-03-08 PROCEDURE — 99214 OFFICE O/P EST MOD 30 MIN: CPT | Mod: S$PBB,,, | Performed by: NURSE PRACTITIONER

## 2021-03-08 PROCEDURE — 99999 PR PBB SHADOW E&M-EST. PATIENT-LVL V: ICD-10-PCS | Mod: PBBFAC,,, | Performed by: NURSE PRACTITIONER

## 2021-03-08 PROCEDURE — 99999 PR PBB SHADOW E&M-EST. PATIENT-LVL V: CPT | Mod: PBBFAC,,, | Performed by: NURSE PRACTITIONER

## 2021-03-08 PROCEDURE — 99215 OFFICE O/P EST HI 40 MIN: CPT | Mod: PBBFAC,PO,25 | Performed by: NURSE PRACTITIONER

## 2021-03-08 PROCEDURE — 90471 IMMUNIZATION ADMIN: CPT | Mod: PBBFAC,PO

## 2021-03-08 PROCEDURE — 83036 HEMOGLOBIN GLYCOSYLATED A1C: CPT | Performed by: FAMILY MEDICINE

## 2021-03-08 RX ORDER — LANCETS 28 GAUGE
1 EACH MISCELLANEOUS 2 TIMES DAILY
Qty: 100 EACH | Refills: 11 | Status: SHIPPED | OUTPATIENT
Start: 2021-03-08 | End: 2021-09-09 | Stop reason: SDUPTHER

## 2021-03-09 LAB
ESTIMATED AVG GLUCOSE: 108 MG/DL (ref 68–131)
HBA1C MFR BLD: 5.4 % (ref 4–5.6)

## 2021-03-15 ENCOUNTER — PATIENT OUTREACH (OUTPATIENT)
Dept: ADMINISTRATIVE | Facility: HOSPITAL | Age: 51
End: 2021-03-15

## 2021-03-18 ENCOUNTER — IMMUNIZATION (OUTPATIENT)
Dept: PRIMARY CARE CLINIC | Facility: CLINIC | Age: 51
End: 2021-03-18

## 2021-03-18 DIAGNOSIS — Z23 NEED FOR VACCINATION: Primary | ICD-10-CM

## 2021-03-18 PROCEDURE — 91301 COVID-19, MRNA, LNP-S, PF, 100 MCG/0.5 ML DOSE VACCINE: CPT | Mod: S$GLB,,, | Performed by: FAMILY MEDICINE

## 2021-03-18 PROCEDURE — 0011A COVID-19, MRNA, LNP-S, PF, 100 MCG/0.5 ML DOSE VACCINE: ICD-10-PCS | Mod: CV19,S$GLB,, | Performed by: FAMILY MEDICINE

## 2021-03-18 PROCEDURE — 91301 COVID-19, MRNA, LNP-S, PF, 100 MCG/0.5 ML DOSE VACCINE: ICD-10-PCS | Mod: S$GLB,,, | Performed by: FAMILY MEDICINE

## 2021-03-18 PROCEDURE — 0011A COVID-19, MRNA, LNP-S, PF, 100 MCG/0.5 ML DOSE VACCINE: CPT | Mod: CV19,S$GLB,, | Performed by: FAMILY MEDICINE

## 2021-03-22 ENCOUNTER — PATIENT OUTREACH (OUTPATIENT)
Dept: ADMINISTRATIVE | Facility: HOSPITAL | Age: 51
End: 2021-03-22

## 2021-04-05 ENCOUNTER — TELEPHONE (OUTPATIENT)
Dept: INTERNAL MEDICINE | Facility: CLINIC | Age: 51
End: 2021-04-05

## 2021-04-10 DIAGNOSIS — K21.9 GASTROESOPHAGEAL REFLUX DISEASE WITHOUT ESOPHAGITIS: ICD-10-CM

## 2021-04-12 ENCOUNTER — PATIENT OUTREACH (OUTPATIENT)
Dept: ADMINISTRATIVE | Facility: HOSPITAL | Age: 51
End: 2021-04-12

## 2021-04-12 RX ORDER — PEN NEEDLE, DIABETIC 30 GX3/16"
NEEDLE, DISPOSABLE MISCELLANEOUS
Qty: 100 EACH | Refills: 3 | Status: SHIPPED | OUTPATIENT
Start: 2021-04-12 | End: 2021-07-19

## 2021-04-15 ENCOUNTER — IMMUNIZATION (OUTPATIENT)
Dept: PRIMARY CARE CLINIC | Facility: CLINIC | Age: 51
End: 2021-04-15

## 2021-04-15 ENCOUNTER — CLINICAL SUPPORT (OUTPATIENT)
Dept: INTERNAL MEDICINE | Facility: CLINIC | Age: 51
End: 2021-04-15
Payer: MEDICAID

## 2021-04-15 DIAGNOSIS — Z30.42 ENCOUNTER FOR DEPO-PROVERA CONTRACEPTION: Primary | ICD-10-CM

## 2021-04-15 DIAGNOSIS — Z23 NEED FOR VACCINATION: Primary | ICD-10-CM

## 2021-04-15 PROCEDURE — 0012A COVID-19, MRNA, LNP-S, PF, 100 MCG/0.5 ML DOSE VACCINE: CPT | Mod: CV19,S$GLB,, | Performed by: FAMILY MEDICINE

## 2021-04-15 PROCEDURE — 99999 PR PBB SHADOW E&M-EST. PATIENT-LVL II: ICD-10-PCS | Mod: PBBFAC,,,

## 2021-04-15 PROCEDURE — 91301 COVID-19, MRNA, LNP-S, PF, 100 MCG/0.5 ML DOSE VACCINE: CPT | Mod: S$GLB,,, | Performed by: FAMILY MEDICINE

## 2021-04-15 PROCEDURE — 99212 OFFICE O/P EST SF 10 MIN: CPT | Mod: PBBFAC,PO

## 2021-04-15 PROCEDURE — 99999 PR PBB SHADOW E&M-EST. PATIENT-LVL II: CPT | Mod: PBBFAC,,,

## 2021-04-15 PROCEDURE — 96372 THER/PROPH/DIAG INJ SC/IM: CPT | Mod: PBBFAC,PO

## 2021-04-15 PROCEDURE — 0012A COVID-19, MRNA, LNP-S, PF, 100 MCG/0.5 ML DOSE VACCINE: ICD-10-PCS | Mod: CV19,S$GLB,, | Performed by: FAMILY MEDICINE

## 2021-04-15 PROCEDURE — 91301 COVID-19, MRNA, LNP-S, PF, 100 MCG/0.5 ML DOSE VACCINE: ICD-10-PCS | Mod: S$GLB,,, | Performed by: FAMILY MEDICINE

## 2021-04-15 RX ORDER — OMEPRAZOLE 20 MG/1
20 CAPSULE, DELAYED RELEASE ORAL DAILY
Qty: 30 CAPSULE | Refills: 11 | Status: SHIPPED | OUTPATIENT
Start: 2021-04-15 | End: 2021-12-09 | Stop reason: SDUPTHER

## 2021-04-15 RX ORDER — MEDROXYPROGESTERONE ACETATE 150 MG/ML
150 INJECTION, SUSPENSION INTRAMUSCULAR ONCE
Status: COMPLETED | OUTPATIENT
Start: 2021-04-15 | End: 2021-04-15

## 2021-04-15 RX ADMIN — MEDROXYPROGESTERONE ACETATE 150 MG: 150 INJECTION, SUSPENSION INTRAMUSCULAR at 08:04

## 2021-04-19 ENCOUNTER — PATIENT OUTREACH (OUTPATIENT)
Dept: ADMINISTRATIVE | Facility: HOSPITAL | Age: 51
End: 2021-04-19

## 2021-06-16 DIAGNOSIS — E11.9 TYPE 2 DIABETES MELLITUS WITHOUT COMPLICATION: ICD-10-CM

## 2021-07-01 ENCOUNTER — CLINICAL SUPPORT (OUTPATIENT)
Dept: INTERNAL MEDICINE | Facility: CLINIC | Age: 51
End: 2021-07-01
Payer: MEDICAID

## 2021-07-01 PROCEDURE — 99212 OFFICE O/P EST SF 10 MIN: CPT | Mod: PBBFAC,PO

## 2021-07-01 PROCEDURE — 99999 PR PBB SHADOW E&M-EST. PATIENT-LVL II: ICD-10-PCS | Mod: PBBFAC,,,

## 2021-07-01 PROCEDURE — 96372 THER/PROPH/DIAG INJ SC/IM: CPT | Mod: PBBFAC,PO

## 2021-07-01 PROCEDURE — 99999 PR PBB SHADOW E&M-EST. PATIENT-LVL II: CPT | Mod: PBBFAC,,,

## 2021-07-01 RX ORDER — MEDROXYPROGESTERONE ACETATE 150 MG/ML
150 INJECTION, SUSPENSION INTRAMUSCULAR ONCE
Status: COMPLETED | OUTPATIENT
Start: 2021-07-01 | End: 2021-07-01

## 2021-07-01 RX ORDER — MEDROXYPROGESTERONE ACETATE 150 MG/ML
150 INJECTION, SUSPENSION INTRAMUSCULAR
Status: CANCELLED | OUTPATIENT
Start: 2021-07-01 | End: 2021-07-01

## 2021-07-01 RX ADMIN — MEDROXYPROGESTERONE ACETATE 150 MG: 150 INJECTION, SUSPENSION INTRAMUSCULAR at 08:07

## 2021-07-08 ENCOUNTER — PATIENT OUTREACH (OUTPATIENT)
Dept: ADMINISTRATIVE | Facility: HOSPITAL | Age: 51
End: 2021-07-08

## 2021-08-23 ENCOUNTER — TELEPHONE (OUTPATIENT)
Dept: INTERNAL MEDICINE | Facility: CLINIC | Age: 51
End: 2021-08-23

## 2021-08-23 DIAGNOSIS — Z12.39 ENCOUNTER FOR SCREENING FOR MALIGNANT NEOPLASM OF BREAST, UNSPECIFIED SCREENING MODALITY: Primary | ICD-10-CM

## 2021-09-09 ENCOUNTER — OFFICE VISIT (OUTPATIENT)
Dept: INTERNAL MEDICINE | Facility: CLINIC | Age: 51
End: 2021-09-09
Payer: MEDICAID

## 2021-09-09 ENCOUNTER — HOSPITAL ENCOUNTER (OUTPATIENT)
Dept: RADIOLOGY | Facility: HOSPITAL | Age: 51
Discharge: HOME | End: 2021-09-09
Attending: FAMILY MEDICINE
Payer: MEDICAID

## 2021-09-09 VITALS
WEIGHT: 255.5 LBS | BODY MASS INDEX: 38.72 KG/M2 | OXYGEN SATURATION: 99 % | DIASTOLIC BLOOD PRESSURE: 86 MMHG | SYSTOLIC BLOOD PRESSURE: 134 MMHG | RESPIRATION RATE: 18 BRPM | HEIGHT: 68 IN | TEMPERATURE: 98 F | HEART RATE: 86 BPM

## 2021-09-09 VITALS — BODY MASS INDEX: 38.65 KG/M2 | WEIGHT: 255 LBS | HEIGHT: 68 IN

## 2021-09-09 DIAGNOSIS — I10 ESSENTIAL HYPERTENSION: ICD-10-CM

## 2021-09-09 DIAGNOSIS — R80.9 TYPE 2 DIABETES MELLITUS WITH MICROALBUMINURIA, WITHOUT LONG-TERM CURRENT USE OF INSULIN: ICD-10-CM

## 2021-09-09 DIAGNOSIS — Z12.39 ENCOUNTER FOR SCREENING FOR MALIGNANT NEOPLASM OF BREAST, UNSPECIFIED SCREENING MODALITY: ICD-10-CM

## 2021-09-09 DIAGNOSIS — E11.9 TYPE 2 DIABETES MELLITUS WITHOUT COMPLICATION, WITHOUT LONG-TERM CURRENT USE OF INSULIN: Primary | ICD-10-CM

## 2021-09-09 DIAGNOSIS — Z12.11 SCREEN FOR COLON CANCER: ICD-10-CM

## 2021-09-09 DIAGNOSIS — E11.29 TYPE 2 DIABETES MELLITUS WITH MICROALBUMINURIA, WITHOUT LONG-TERM CURRENT USE OF INSULIN: ICD-10-CM

## 2021-09-09 PROCEDURE — 77067 SCR MAMMO BI INCL CAD: CPT | Mod: 26,,, | Performed by: RADIOLOGY

## 2021-09-09 PROCEDURE — 90471 IMMUNIZATION ADMIN: CPT | Mod: PBBFAC,PO

## 2021-09-09 PROCEDURE — 99214 OFFICE O/P EST MOD 30 MIN: CPT | Mod: PBBFAC,PO | Performed by: FAMILY MEDICINE

## 2021-09-09 PROCEDURE — 99214 OFFICE O/P EST MOD 30 MIN: CPT | Mod: S$PBB,,, | Performed by: FAMILY MEDICINE

## 2021-09-09 PROCEDURE — 90750 HZV VACC RECOMBINANT IM: CPT | Mod: PBBFAC,PO

## 2021-09-09 PROCEDURE — 99999 PR PBB SHADOW E&M-EST. PATIENT-LVL IV: ICD-10-PCS | Mod: PBBFAC,,, | Performed by: FAMILY MEDICINE

## 2021-09-09 PROCEDURE — 77063 BREAST TOMOSYNTHESIS BI: CPT | Mod: 26,,, | Performed by: RADIOLOGY

## 2021-09-09 PROCEDURE — 99214 PR OFFICE/OUTPT VISIT, EST, LEVL IV, 30-39 MIN: ICD-10-PCS | Mod: S$PBB,,, | Performed by: FAMILY MEDICINE

## 2021-09-09 PROCEDURE — 77067 MAMMO DIGITAL SCREENING BILAT WITH TOMO: ICD-10-PCS | Mod: 26,,, | Performed by: RADIOLOGY

## 2021-09-09 PROCEDURE — 77063 MAMMO DIGITAL SCREENING BILAT WITH TOMO: ICD-10-PCS | Mod: 26,,, | Performed by: RADIOLOGY

## 2021-09-09 PROCEDURE — 99999 PR PBB SHADOW E&M-EST. PATIENT-LVL IV: CPT | Mod: PBBFAC,,, | Performed by: FAMILY MEDICINE

## 2021-09-09 PROCEDURE — 77067 SCR MAMMO BI INCL CAD: CPT | Mod: TC,PO

## 2021-09-09 RX ORDER — OLOPATADINE HYDROCHLORIDE 1 MG/ML
SOLUTION/ DROPS OPHTHALMIC
COMMUNITY
Start: 2021-09-04 | End: 2021-12-27

## 2021-09-09 RX ORDER — LANCETS 33 GAUGE
EACH MISCELLANEOUS 2 TIMES DAILY
COMMUNITY
Start: 2021-06-20

## 2021-09-13 ENCOUNTER — PATIENT MESSAGE (OUTPATIENT)
Dept: INTERNAL MEDICINE | Facility: CLINIC | Age: 51
End: 2021-09-13

## 2021-09-13 DIAGNOSIS — E11.9 TYPE 2 DIABETES MELLITUS WITHOUT COMPLICATION, WITHOUT LONG-TERM CURRENT USE OF INSULIN: Primary | ICD-10-CM

## 2021-09-13 RX ORDER — LOSARTAN POTASSIUM 25 MG/1
25 TABLET ORAL DAILY
Qty: 90 TABLET | Refills: 3 | Status: SHIPPED | OUTPATIENT
Start: 2021-09-13 | End: 2021-12-09 | Stop reason: SDUPTHER

## 2021-09-23 ENCOUNTER — LAB VISIT (OUTPATIENT)
Dept: LAB | Facility: HOSPITAL | Age: 51
End: 2021-09-23
Attending: FAMILY MEDICINE
Payer: MEDICAID

## 2021-09-23 DIAGNOSIS — E11.9 TYPE 2 DIABETES MELLITUS WITHOUT COMPLICATION, WITHOUT LONG-TERM CURRENT USE OF INSULIN: ICD-10-CM

## 2021-09-23 LAB
ANION GAP SERPL CALC-SCNC: 11 MMOL/L (ref 8–16)
BUN SERPL-MCNC: 6 MG/DL (ref 6–20)
CALCIUM SERPL-MCNC: 9.2 MG/DL (ref 8.7–10.5)
CHLORIDE SERPL-SCNC: 107 MMOL/L (ref 95–110)
CO2 SERPL-SCNC: 23 MMOL/L (ref 23–29)
CREAT SERPL-MCNC: 0.8 MG/DL (ref 0.5–1.4)
EST. GFR  (AFRICAN AMERICAN): >60 ML/MIN/1.73 M^2
EST. GFR  (NON AFRICAN AMERICAN): >60 ML/MIN/1.73 M^2
GLUCOSE SERPL-MCNC: 94 MG/DL (ref 70–110)
POTASSIUM SERPL-SCNC: 3.8 MMOL/L (ref 3.5–5.1)
SODIUM SERPL-SCNC: 141 MMOL/L (ref 136–145)

## 2021-09-23 PROCEDURE — 80048 BASIC METABOLIC PNL TOTAL CA: CPT | Mod: PO | Performed by: FAMILY MEDICINE

## 2021-09-23 PROCEDURE — 36415 COLL VENOUS BLD VENIPUNCTURE: CPT | Mod: PO | Performed by: FAMILY MEDICINE

## 2021-09-27 LAB
LEFT EYE DM RETINOPATHY: NEGATIVE
RIGHT EYE DM RETINOPATHY: NEGATIVE

## 2021-10-01 ENCOUNTER — PATIENT OUTREACH (OUTPATIENT)
Dept: ADMINISTRATIVE | Facility: HOSPITAL | Age: 51
End: 2021-10-01

## 2021-10-19 ENCOUNTER — OFFICE VISIT (OUTPATIENT)
Dept: INTERNAL MEDICINE | Facility: CLINIC | Age: 51
End: 2021-10-19
Payer: MEDICAID

## 2021-10-19 ENCOUNTER — TELEPHONE (OUTPATIENT)
Dept: INTERNAL MEDICINE | Facility: CLINIC | Age: 51
End: 2021-10-19

## 2021-10-19 VITALS
OXYGEN SATURATION: 98 % | WEIGHT: 252.44 LBS | DIASTOLIC BLOOD PRESSURE: 88 MMHG | SYSTOLIC BLOOD PRESSURE: 136 MMHG | HEART RATE: 82 BPM | TEMPERATURE: 98 F | BODY MASS INDEX: 38.26 KG/M2 | RESPIRATION RATE: 18 BRPM | HEIGHT: 68 IN

## 2021-10-19 DIAGNOSIS — I10 ESSENTIAL HYPERTENSION: ICD-10-CM

## 2021-10-19 DIAGNOSIS — E11.9 TYPE 2 DIABETES MELLITUS WITHOUT COMPLICATION, WITHOUT LONG-TERM CURRENT USE OF INSULIN: Primary | ICD-10-CM

## 2021-10-19 DIAGNOSIS — E66.01 SEVERE OBESITY (BMI 35.0-39.9) WITH COMORBIDITY: ICD-10-CM

## 2021-10-19 PROCEDURE — 99999 PR PBB SHADOW E&M-EST. PATIENT-LVL IV: ICD-10-PCS | Mod: PBBFAC,,, | Performed by: NURSE PRACTITIONER

## 2021-10-19 PROCEDURE — 99999 PR PBB SHADOW E&M-EST. PATIENT-LVL IV: CPT | Mod: PBBFAC,,, | Performed by: NURSE PRACTITIONER

## 2021-10-19 PROCEDURE — 90686 IIV4 VACC NO PRSV 0.5 ML IM: CPT | Mod: PBBFAC,PO

## 2021-10-19 PROCEDURE — 99213 OFFICE O/P EST LOW 20 MIN: CPT | Mod: S$PBB,,, | Performed by: NURSE PRACTITIONER

## 2021-10-19 PROCEDURE — 99213 PR OFFICE/OUTPT VISIT, EST, LEVL III, 20-29 MIN: ICD-10-PCS | Mod: S$PBB,,, | Performed by: NURSE PRACTITIONER

## 2021-10-19 PROCEDURE — 99214 OFFICE O/P EST MOD 30 MIN: CPT | Mod: PBBFAC,PO | Performed by: NURSE PRACTITIONER

## 2021-10-20 LAB — NONINV COLON CA DNA+OCC BLD SCRN STL QL: NEGATIVE

## 2021-10-25 ENCOUNTER — TELEPHONE (OUTPATIENT)
Dept: INTERNAL MEDICINE | Facility: CLINIC | Age: 51
End: 2021-10-25
Payer: MEDICAID

## 2021-10-25 ENCOUNTER — CLINICAL SUPPORT (OUTPATIENT)
Dept: INTERNAL MEDICINE | Facility: CLINIC | Age: 51
End: 2021-10-25
Payer: MEDICAID

## 2021-10-25 DIAGNOSIS — Z30.42 ENCOUNTER FOR DEPO-PROVERA CONTRACEPTION: Primary | ICD-10-CM

## 2021-10-25 LAB
B-HCG UR QL: NEGATIVE
CTP QC/QA: YES

## 2021-10-25 PROCEDURE — 81025 URINE PREGNANCY TEST: CPT | Mod: PBBFAC,PO

## 2021-10-25 PROCEDURE — 96372 THER/PROPH/DIAG INJ SC/IM: CPT | Mod: PBBFAC,PO

## 2021-10-25 RX ORDER — MEDROXYPROGESTERONE ACETATE 150 MG/ML
150 INJECTION, SUSPENSION INTRAMUSCULAR
Status: COMPLETED | OUTPATIENT
Start: 2021-10-25 | End: 2021-10-25

## 2021-10-25 RX ADMIN — MEDROXYPROGESTERONE ACETATE 150 MG: 150 INJECTION, SUSPENSION INTRAMUSCULAR at 08:10

## 2021-12-09 ENCOUNTER — OFFICE VISIT (OUTPATIENT)
Dept: INTERNAL MEDICINE | Facility: CLINIC | Age: 51
End: 2021-12-09
Payer: MEDICAID

## 2021-12-09 VITALS
DIASTOLIC BLOOD PRESSURE: 84 MMHG | OXYGEN SATURATION: 98 % | BODY MASS INDEX: 38.52 KG/M2 | HEART RATE: 90 BPM | RESPIRATION RATE: 17 BRPM | HEIGHT: 68 IN | SYSTOLIC BLOOD PRESSURE: 136 MMHG | TEMPERATURE: 99 F | WEIGHT: 254.19 LBS

## 2021-12-09 DIAGNOSIS — E66.01 SEVERE OBESITY (BMI 35.0-39.9) WITH COMORBIDITY: ICD-10-CM

## 2021-12-09 DIAGNOSIS — K21.9 GASTROESOPHAGEAL REFLUX DISEASE WITHOUT ESOPHAGITIS: ICD-10-CM

## 2021-12-09 DIAGNOSIS — J34.89 NASAL SORE: ICD-10-CM

## 2021-12-09 DIAGNOSIS — I10 ESSENTIAL HYPERTENSION: Primary | ICD-10-CM

## 2021-12-09 DIAGNOSIS — E11.9 TYPE 2 DIABETES MELLITUS WITHOUT COMPLICATION, WITHOUT LONG-TERM CURRENT USE OF INSULIN: ICD-10-CM

## 2021-12-09 PROCEDURE — 99999 PR PBB SHADOW E&M-EST. PATIENT-LVL V: ICD-10-PCS | Mod: PBBFAC,,, | Performed by: NURSE PRACTITIONER

## 2021-12-09 PROCEDURE — 99213 PR OFFICE/OUTPT VISIT, EST, LEVL III, 20-29 MIN: ICD-10-PCS | Mod: S$PBB,,, | Performed by: NURSE PRACTITIONER

## 2021-12-09 PROCEDURE — 3060F POS MICROALBUMINURIA REV: CPT | Mod: CPTII,,, | Performed by: NURSE PRACTITIONER

## 2021-12-09 PROCEDURE — 99215 OFFICE O/P EST HI 40 MIN: CPT | Mod: PBBFAC,PO | Performed by: NURSE PRACTITIONER

## 2021-12-09 PROCEDURE — 99999 PR PBB SHADOW E&M-EST. PATIENT-LVL V: CPT | Mod: PBBFAC,,, | Performed by: NURSE PRACTITIONER

## 2021-12-09 PROCEDURE — 3060F PR POS MICROALBUMINURIA RESULT DOCUMENTED/REVIEW: ICD-10-PCS | Mod: CPTII,,, | Performed by: NURSE PRACTITIONER

## 2021-12-09 PROCEDURE — 99213 OFFICE O/P EST LOW 20 MIN: CPT | Mod: S$PBB,,, | Performed by: NURSE PRACTITIONER

## 2021-12-09 PROCEDURE — 3066F PR DOCUMENTATION OF TREATMENT FOR NEPHROPATHY: ICD-10-PCS | Mod: CPTII,,, | Performed by: NURSE PRACTITIONER

## 2021-12-09 PROCEDURE — 3066F NEPHROPATHY DOC TX: CPT | Mod: CPTII,,, | Performed by: NURSE PRACTITIONER

## 2021-12-09 PROCEDURE — 4010F ACE/ARB THERAPY RXD/TAKEN: CPT | Mod: CPTII,,, | Performed by: NURSE PRACTITIONER

## 2021-12-09 PROCEDURE — 4010F PR ACE/ARB THEARPY RXD/TAKEN: ICD-10-PCS | Mod: CPTII,,, | Performed by: NURSE PRACTITIONER

## 2021-12-09 RX ORDER — OMEPRAZOLE 20 MG/1
20 CAPSULE, DELAYED RELEASE ORAL DAILY
Qty: 30 CAPSULE | Refills: 11 | Status: SHIPPED | OUTPATIENT
Start: 2021-12-09 | End: 2023-07-09 | Stop reason: SDUPTHER

## 2021-12-09 RX ORDER — LOSARTAN POTASSIUM 25 MG/1
25 TABLET ORAL DAILY
Qty: 90 TABLET | Refills: 1 | Status: CANCELLED | OUTPATIENT
Start: 2021-12-09 | End: 2022-12-09

## 2021-12-09 RX ORDER — MUPIROCIN 20 MG/G
OINTMENT TOPICAL 3 TIMES DAILY
Qty: 22 G | Refills: 1 | Status: SHIPPED | OUTPATIENT
Start: 2021-12-09 | End: 2022-01-12

## 2021-12-09 RX ORDER — LOSARTAN POTASSIUM 25 MG/1
25 TABLET ORAL DAILY
Qty: 90 TABLET | Refills: 3 | Status: SHIPPED | OUTPATIENT
Start: 2021-12-09 | End: 2022-03-21

## 2022-01-05 ENCOUNTER — TELEPHONE (OUTPATIENT)
Dept: INTERNAL MEDICINE | Facility: CLINIC | Age: 52
End: 2022-01-05
Payer: MEDICAID

## 2022-01-05 NOTE — TELEPHONE ENCOUNTER
----- Message from Nelly Bailey sent at 1/5/2022 12:51 PM CST -----  Pt would like return call, pt states prior authorization for Victozia prescription.  Please call back at .[phone].  Nomi Brandt

## 2022-01-06 ENCOUNTER — TELEPHONE (OUTPATIENT)
Dept: INTERNAL MEDICINE | Facility: CLINIC | Age: 52
End: 2022-01-06
Payer: MEDICAID

## 2022-01-06 DIAGNOSIS — E11.9 TYPE 2 DIABETES MELLITUS WITHOUT COMPLICATION, WITHOUT LONG-TERM CURRENT USE OF INSULIN: Primary | ICD-10-CM

## 2022-01-11 NOTE — TELEPHONE ENCOUNTER
----- Message from Kimberly Guerrero sent at 1/11/2022 10:16 AM CST -----  Contact: MELVINA HAN [[id number]]  .Type:  Needs Medical Advice    Who Called: MELVINA HAN [[id number]]  Symptoms (please be specific):   How long has patient had these symptoms:   Pharmacy name and phone #:    Would the patient rather a call back or a response via [facility]? call  Best Call Back Number: .[phone] (home)    Additional Information:  Pt is req a call back in regards to a rx she states that VICTOZA 2-HERRERA 0.6 mg/0.1 mL (18 mg/3 mL) PnIj pen is the one she is having issues with. Pharmacy advised that she needed to speak with nurse to see what can be done to assist with getting this rx filled since the insurance denied the rx

## 2022-01-31 ENCOUNTER — TELEPHONE (OUTPATIENT)
Dept: INTERNAL MEDICINE | Facility: CLINIC | Age: 52
End: 2022-01-31
Payer: MEDICAID

## 2022-01-31 NOTE — TELEPHONE ENCOUNTER
----- Message from Nichol Lyons sent at 1/31/2022  9:51 AM CST -----  Contact: Patient, [phone]  No blue slot available to schedule an appointment for the patient.  Patient is established with which PCP: Dr Wing  Reason for the visit: Left eye irritation  Would the patient like a call back, or a response through their [facility] portal?:  Call back

## 2022-01-31 NOTE — TELEPHONE ENCOUNTER
Patient wanted an appointment for 02/01/22, informed patient nothing was available. Offered patient an virtual,patient declined stating wanted an in office visit. She also needed to discuss some paper work for weight loss program. Informed patient can drop paper work off and Adali can review paper work. Patient was offered the next available which is in May. Patient stated she did not want to wait informed patient she has an appointment in March and she could try to get in with her eye doctor. Patient stated she has a eye doctor but thought wit medical insurance she could be seen but she guess not. Informed patient was sorry but could try urgent care. Patient express understanding

## 2022-02-21 ENCOUNTER — CLINICAL SUPPORT (OUTPATIENT)
Dept: INTERNAL MEDICINE | Facility: CLINIC | Age: 52
End: 2022-02-21
Payer: MEDICAID

## 2022-02-21 DIAGNOSIS — E11.9 TYPE 2 DIABETES MELLITUS WITHOUT COMPLICATION, WITHOUT LONG-TERM CURRENT USE OF INSULIN: Primary | ICD-10-CM

## 2022-02-21 DIAGNOSIS — Z30.42 ENCOUNTER FOR DEPO-PROVERA CONTRACEPTION: Primary | ICD-10-CM

## 2022-02-21 LAB
B-HCG UR QL: NEGATIVE
CTP QC/QA: YES

## 2022-02-21 PROCEDURE — 81025 URINE PREGNANCY TEST: CPT | Mod: PBBFAC,PO

## 2022-02-21 PROCEDURE — 99999 PR PBB SHADOW E&M-EST. PATIENT-LVL II: ICD-10-PCS | Mod: PBBFAC,,,

## 2022-02-21 PROCEDURE — 99999 PR PBB SHADOW E&M-EST. PATIENT-LVL II: CPT | Mod: PBBFAC,,,

## 2022-02-21 PROCEDURE — 99212 OFFICE O/P EST SF 10 MIN: CPT | Mod: PBBFAC,PO

## 2022-02-21 PROCEDURE — 96372 THER/PROPH/DIAG INJ SC/IM: CPT | Mod: PBBFAC,PO

## 2022-02-21 RX ORDER — MEDROXYPROGESTERONE ACETATE 150 MG/ML
150 INJECTION, SUSPENSION INTRAMUSCULAR
Status: COMPLETED | OUTPATIENT
Start: 2022-02-21 | End: 2022-02-21

## 2022-02-21 RX ADMIN — MEDROXYPROGESTERONE ACETATE 150 MG: 150 INJECTION, SUSPENSION INTRAMUSCULAR at 08:02

## 2022-02-21 NOTE — TELEPHONE ENCOUNTER
Care Due:                  Date            Visit Type   Department     Provider  --------------------------------------------------------------------------------                                EP -                              PRIMARY      [facility]  Last Visit: 09-      CARE ([facility])   MEDICINE       Jose Llanes  Next Visit: None Scheduled  None         None Found                                                            Last  Test          Frequency    Reason                     Performed    Due Date  --------------------------------------------------------------------------------    CMP.........  12 months..  atorvastatin.............  03- 03-    Powered by [employer]. Reference number: [id number].   2/21/2022 4:07:57 AM CST

## 2022-02-22 RX ORDER — PEN NEEDLE, DIABETIC 30 GX3/16"
NEEDLE, DISPOSABLE MISCELLANEOUS
Qty: 100 EACH | Refills: 3 | Status: SHIPPED | OUTPATIENT
Start: 2022-02-22 | End: 2022-03-21 | Stop reason: ALTCHOICE

## 2022-02-22 NOTE — TELEPHONE ENCOUNTER
"Refill Authorization Note   Marcie Geiger  is requesting a refill authorization.  Brief Assessment and Rationale for Refill:  Approve     Medication Therapy Plan:       Medication Reconciliation Completed: No   Comments:   --->Care Gap information included below if applicable.   Orders Placed This Encounter    pen needle, diabetic (BD ULTRA-FINE MINI PEN NEEDLE) 31 gauge x 3/16" Ndle      Requested Prescriptions   Signed Prescriptions Disp Refills    pen needle, diabetic (BD ULTRA-FINE MINI PEN NEEDLE) 31 gauge x 3/16" Ndle 100 each 3     Sig: USE ONCE DAILY WITH INSULIN       Endocrinology: Diabetes - Supplies Passed - 2/21/2022  4:07 AM        Passed - Patient is at least 18 years old        Passed - Valid encounter within last 15 months     Recent Visits  Date Type Provider Dept   12/07/20 Office Visit Yayo Wing DO [facility]   Showing recent visits within past 720 days and meeting all other requirements  Future Appointments  No visits were found meeting these conditions.  Showing future appointments within next 150 days and meeting all other requirements      Future Appointments              In 3 weeks [facility] [place] [facility] [place]    In 3 weeks Adali Sibley NP [place] [facility] [place]    In 2 months INTERNAL MEDICINE NURSE, [facility] [place] [facility] [place]                Passed - HBA1C within 1080 days     Lab Results   Component Value Date    HGBA1C 6.0 (H) 09/09/2021    HGBA1C 5.4 03/08/2021    HGBA1C 5.6 09/03/2020                  Appointments  past 12m or future 3m with PCP    Date Provider   Last Visit   12/7/2020 Yayo Wing DO   Next Visit   Visit date not found Yayo Wing DO   ED visits in past 90 days: 0     Note composed:1:04 PM 02/22/2022         "

## 2022-03-18 ENCOUNTER — LAB VISIT (OUTPATIENT)
Dept: LAB | Facility: HOSPITAL | Age: 52
End: 2022-03-18
Payer: MEDICAID

## 2022-03-18 DIAGNOSIS — E11.9 TYPE 2 DIABETES MELLITUS WITHOUT COMPLICATION, WITHOUT LONG-TERM CURRENT USE OF INSULIN: ICD-10-CM

## 2022-03-18 LAB
ESTIMATED AVG GLUCOSE: 123 MG/DL (ref 68–131)
HBA1C MFR BLD: 5.9 % (ref 4–5.6)

## 2022-03-18 PROCEDURE — 83036 HEMOGLOBIN GLYCOSYLATED A1C: CPT | Performed by: NURSE PRACTITIONER

## 2022-03-18 PROCEDURE — 36415 COLL VENOUS BLD VENIPUNCTURE: CPT | Mod: PO | Performed by: NURSE PRACTITIONER

## 2022-03-21 ENCOUNTER — OFFICE VISIT (OUTPATIENT)
Dept: INTERNAL MEDICINE | Facility: CLINIC | Age: 52
End: 2022-03-21
Payer: MEDICAID

## 2022-03-21 VITALS
BODY MASS INDEX: 39.03 KG/M2 | TEMPERATURE: 98 F | HEIGHT: 68 IN | WEIGHT: 257.5 LBS | SYSTOLIC BLOOD PRESSURE: 144 MMHG | OXYGEN SATURATION: 96 % | HEART RATE: 103 BPM | DIASTOLIC BLOOD PRESSURE: 92 MMHG

## 2022-03-21 DIAGNOSIS — E11.9 TYPE 2 DIABETES MELLITUS WITHOUT COMPLICATION, WITHOUT LONG-TERM CURRENT USE OF INSULIN: ICD-10-CM

## 2022-03-21 DIAGNOSIS — I10 ESSENTIAL HYPERTENSION: Primary | ICD-10-CM

## 2022-03-21 DIAGNOSIS — K21.9 GASTROESOPHAGEAL REFLUX DISEASE WITHOUT ESOPHAGITIS: ICD-10-CM

## 2022-03-21 DIAGNOSIS — E66.01 SEVERE OBESITY (BMI 35.0-39.9) WITH COMORBIDITY: ICD-10-CM

## 2022-03-21 PROCEDURE — 3077F SYST BP >= 140 MM HG: CPT | Mod: CPTII,,, | Performed by: NURSE PRACTITIONER

## 2022-03-21 PROCEDURE — 3008F PR BODY MASS INDEX (BMI) DOCUMENTED: ICD-10-PCS | Mod: CPTII,,, | Performed by: NURSE PRACTITIONER

## 2022-03-21 PROCEDURE — 1159F PR MEDICATION LIST DOCUMENTED IN MEDICAL RECORD: ICD-10-PCS | Mod: CPTII,,, | Performed by: NURSE PRACTITIONER

## 2022-03-21 PROCEDURE — 4010F ACE/ARB THERAPY RXD/TAKEN: CPT | Mod: CPTII,,, | Performed by: NURSE PRACTITIONER

## 2022-03-21 PROCEDURE — 3044F PR MOST RECENT HEMOGLOBIN A1C LEVEL <7.0%: ICD-10-PCS | Mod: CPTII,,, | Performed by: NURSE PRACTITIONER

## 2022-03-21 PROCEDURE — 99999 PR PBB SHADOW E&M-EST. PATIENT-LVL IV: ICD-10-PCS | Mod: PBBFAC,,, | Performed by: NURSE PRACTITIONER

## 2022-03-21 PROCEDURE — 3077F PR MOST RECENT SYSTOLIC BLOOD PRESSURE >= 140 MM HG: ICD-10-PCS | Mod: CPTII,,, | Performed by: NURSE PRACTITIONER

## 2022-03-21 PROCEDURE — 1159F MED LIST DOCD IN RCRD: CPT | Mod: CPTII,,, | Performed by: NURSE PRACTITIONER

## 2022-03-21 PROCEDURE — 99214 OFFICE O/P EST MOD 30 MIN: CPT | Mod: S$PBB,,, | Performed by: NURSE PRACTITIONER

## 2022-03-21 PROCEDURE — 99999 PR PBB SHADOW E&M-EST. PATIENT-LVL IV: CPT | Mod: PBBFAC,,, | Performed by: NURSE PRACTITIONER

## 2022-03-21 PROCEDURE — 3080F DIAST BP >= 90 MM HG: CPT | Mod: CPTII,,, | Performed by: NURSE PRACTITIONER

## 2022-03-21 PROCEDURE — 1160F PR REVIEW ALL MEDS BY PRESCRIBER/CLIN PHARMACIST DOCUMENTED: ICD-10-PCS | Mod: CPTII,,, | Performed by: NURSE PRACTITIONER

## 2022-03-21 PROCEDURE — 4010F PR ACE/ARB THEARPY RXD/TAKEN: ICD-10-PCS | Mod: CPTII,,, | Performed by: NURSE PRACTITIONER

## 2022-03-21 PROCEDURE — 99214 PR OFFICE/OUTPT VISIT, EST, LEVL IV, 30-39 MIN: ICD-10-PCS | Mod: S$PBB,,, | Performed by: NURSE PRACTITIONER

## 2022-03-21 PROCEDURE — 1160F RVW MEDS BY RX/DR IN RCRD: CPT | Mod: CPTII,,, | Performed by: NURSE PRACTITIONER

## 2022-03-21 PROCEDURE — 99214 OFFICE O/P EST MOD 30 MIN: CPT | Mod: PBBFAC,PO | Performed by: NURSE PRACTITIONER

## 2022-03-21 PROCEDURE — 3080F PR MOST RECENT DIASTOLIC BLOOD PRESSURE >= 90 MM HG: ICD-10-PCS | Mod: CPTII,,, | Performed by: NURSE PRACTITIONER

## 2022-03-21 PROCEDURE — 3044F HG A1C LEVEL LT 7.0%: CPT | Mod: CPTII,,, | Performed by: NURSE PRACTITIONER

## 2022-03-21 PROCEDURE — 3008F BODY MASS INDEX DOCD: CPT | Mod: CPTII,,, | Performed by: NURSE PRACTITIONER

## 2022-03-21 RX ORDER — LOSARTAN POTASSIUM 50 MG/1
50 TABLET ORAL DAILY
Qty: 90 TABLET | Refills: 3 | Status: SHIPPED | OUTPATIENT
Start: 2022-03-21 | End: 2023-03-13

## 2022-03-21 NOTE — ASSESSMENT & PLAN NOTE
Elevated today. Increasing to 50 mg losartan since she has been trending in high 130s/80s-90s at previous visits. Nurse visit in two weeks.

## 2022-03-21 NOTE — ASSESSMENT & PLAN NOTE
Counseled on importance of diet and exercise in order to improve overall quality of life, and reduce risk of future comorbidities.  Has plans for weight loss surgery.

## 2022-03-21 NOTE — PROGRESS NOTES
Subjective:       Patient ID: Marcie Geiger is a 51 y.o. female.    Chief Complaint: Hypertension and Diabetes    Mrs. Geiger presents to visit for HTN/DM visit. Has been stable. Compliant with medications. Plans to have weight loss surgery. Has been trying to walk regularly for exercise, and has been trying to have balanced/diabetic diet.     Diabetes  She presents for her follow-up diabetic visit. She has type 2 diabetes mellitus. Her disease course has been stable. There are no hypoglycemic associated symptoms. Pertinent negatives for hypoglycemia include no dizziness, headaches, nervousness/anxiousness or sleepiness. There are no diabetic associated symptoms. Pertinent negatives for diabetes include no blurred vision, no chest pain, no fatigue, no foot paresthesias, no foot ulcerations, no polydipsia, no polyphagia, no polyuria, no visual change and no weakness. There are no hypoglycemic complications. There are no diabetic complications. Risk factors for coronary artery disease include diabetes mellitus, hypertension, obesity and post-menopausal. Current diabetic treatment includes oral agent (monotherapy) (trulicity). She is compliant with treatment all of the time. She is following a generally healthy and diabetic diet. When asked about meal planning, she reported none. She participates in exercise intermittently. Her breakfast blood glucose range is generally  mg/dl. An ACE inhibitor/angiotensin II receptor blocker is being taken. Eye exam is current.       Patient Active Problem List   Diagnosis    Type 2 diabetes mellitus without complication, without long-term current use of insulin    Severe obesity (BMI 35.0-39.9) with comorbidity    Gastroesophageal reflux disease without esophagitis    Encounter for Depo-Provera contraception    Atopic dermatitis    Essential hypertension       Family History   Problem Relation Age of Onset    Diabetes Mother     Heart disease Mother     Heart  attack Father     Heart disease Father     Diabetes Brother     Hypertension Brother      History reviewed. No pertinent surgical history.      Current Outpatient Medications:     atorvastatin (LIPITOR) 20 MG tablet, TAKE 1 TABLET(20 MG) BY MOUTH EVERY DAY, Disp: 90 tablet, Rfl: 3    blood-glucose meter (TRUE METRIX GLUCOSE METER) Misc, USE TWO TIMES A DAY, Disp: 1 each, Rfl: 0    cetirizine (ZYRTEC) 10 MG tablet, TAKE 1 TABLET BY MOUTH ONCE DAILY, Disp: 30 tablet, Rfl: 2    dulaglutide (TRULICITY) 0.75 mg/0.5 mL pen injector, Inject 0.75 mg into the skin every 7 days., Disp: 12 pen, Rfl: 0    metFORMIN (GLUCOPHAGE) 500 MG tablet, TAKE 1 TABLET(500 MG) BY MOUTH TWICE DAILY WITH MEALS, Disp: 180 tablet, Rfl: 4    mupirocin (BACTROBAN) 2 % ointment, APPLY TOPICALLY TO THE AFFECTED AREA THREE TIMES DAILY, Disp: 22 g, Rfl: 1    olopatadine (PATANOL) 0.1 % ophthalmic solution, INSTILL 1 DROP IN LEFT EYE TWICE DAILY, Disp: 5 mL, Rfl: 5    omeprazole (PRILOSEC) 20 MG capsule, Take 1 capsule (20 mg total) by mouth once daily., Disp: 30 capsule, Rfl: 11    TRUE METRIX GLUCOSE TEST STRIP Strp, USE TO TEST TWICE DAILY., Disp: 100 strip, Rfl: 11    TRUEPLUS LANCETS 33 gauge Misc, Apply topically 2 (two) times daily., Disp: , Rfl:     azelastine (OPTIVAR) 0.05 % ophthalmic solution, Place 1 drop into the left eye 2 (two) times daily., Disp: 4 mL, Rfl: 11    losartan (COZAAR) 50 MG tablet, Take 1 tablet (50 mg total) by mouth once daily., Disp: 90 tablet, Rfl: 3    Review of Systems   Constitutional: Negative for appetite change, chills, fatigue and fever.   Eyes: Negative for blurred vision.   Respiratory: Negative for cough, chest tightness and shortness of breath.    Cardiovascular: Negative for chest pain, palpitations and leg swelling.   Gastrointestinal: Negative for abdominal pain, diarrhea, nausea and vomiting.   Endocrine: Negative for polydipsia, polyphagia and polyuria.   Musculoskeletal: Negative for  "gait problem.   Neurological: Negative for dizziness, syncope, weakness, light-headedness and headaches.   Psychiatric/Behavioral: Negative for dysphoric mood. The patient is not nervous/anxious.        Objective:   BP (!) 144/92 (BP Location: Left arm, Patient Position: Sitting, BP Method: Medium (Manual))   Pulse 103   Temp 98.2 °F (36.8 °C) (Temporal)   Ht 5' 8" (1.727 m)   Wt 116.8 kg (257 lb 8 oz)   SpO2 96%   BMI 39.15 kg/m²      Physical Exam  Constitutional:       General: She is not in acute distress.     Appearance: Normal appearance. She is not ill-appearing.   Cardiovascular:      Rate and Rhythm: Normal rate and regular rhythm.      Pulses: Normal pulses.      Heart sounds: Normal heart sounds. No murmur heard.    No friction rub. No gallop.   Pulmonary:      Effort: Pulmonary effort is normal. No respiratory distress.      Breath sounds: Normal breath sounds. No wheezing.   Musculoskeletal:         General: Normal range of motion.      Cervical back: Normal range of motion.   Feet:      Comments: FOOT EVALUATION: 10 gram monofilament exam with protective sensation intact bilaterally. Nails appropriately trimmed. No ulcers. Distal pulses palpable.    Skin:     General: Skin is warm and dry.      Coloration: Skin is not pale.      Findings: No erythema.   Neurological:      Mental Status: She is alert and oriented to person, place, and time.   Psychiatric:         Mood and Affect: Mood normal.         Behavior: Behavior normal.         Assessment & Plan     Problem List Items Addressed This Visit        Cardiac/Vascular    Essential hypertension - Primary    Current Assessment & Plan     Elevated today. Increasing to 50 mg losartan since she has been trending in high 130s/80s-90s at previous visits. Nurse visit in two weeks.            Relevant Medications    losartan (COZAAR) 50 MG tablet       Endocrine    Type 2 diabetes mellitus without complication, without long-term current use of insulin    " "Current Assessment & Plan     Well controlled. a1c in 6 months. Continue current medications.            Relevant Orders    Hemoglobin A1C    Lipid Panel    Comprehensive Metabolic Panel    Severe obesity (BMI 35.0-39.9) with comorbidity    Current Assessment & Plan     Counseled on importance of diet and exercise in order to improve overall quality of life, and reduce risk of future comorbidities.  Has plans for weight loss surgery.            Relevant Orders    Lipid Panel    Comprehensive Metabolic Panel       GI    Gastroesophageal reflux disease without esophagitis    Current Assessment & Plan     Stable with prilosec.                Follow up in about 2 weeks (around 4/4/2022) for  hypertension (nurse visit) and 6 months for diabetes follow up. .            Portions of this note may have been created with voice recognition software. Occasional "wrong-word" or "sound-a-like" substitutions may have occurred due to the inherent limitations of voice recognition software. Please, read the note carefully and recognize, using context, where substitutions have occurred.       "

## 2022-04-04 ENCOUNTER — CLINICAL SUPPORT (OUTPATIENT)
Dept: INTERNAL MEDICINE | Facility: CLINIC | Age: 52
End: 2022-04-04
Payer: MEDICAID

## 2022-04-04 VITALS — SYSTOLIC BLOOD PRESSURE: 138 MMHG | DIASTOLIC BLOOD PRESSURE: 70 MMHG

## 2022-04-04 PROCEDURE — 99999 PR PBB SHADOW E&M-EST. PATIENT-LVL I: CPT | Mod: PBBFAC,,,

## 2022-04-04 PROCEDURE — 99999 PR PBB SHADOW E&M-EST. PATIENT-LVL I: ICD-10-PCS | Mod: PBBFAC,,,

## 2022-04-04 PROCEDURE — 99211 OFF/OP EST MAY X REQ PHY/QHP: CPT | Mod: PBBFAC,PO

## 2022-05-09 ENCOUNTER — TELEPHONE (OUTPATIENT)
Dept: INTERNAL MEDICINE | Facility: CLINIC | Age: 52
End: 2022-05-09
Payer: MEDICAID

## 2022-05-09 ENCOUNTER — TELEPHONE (OUTPATIENT)
Dept: INTERNAL MEDICINE | Facility: CLINIC | Age: 52
End: 2022-05-09

## 2022-05-09 ENCOUNTER — CLINICAL SUPPORT (OUTPATIENT)
Dept: INTERNAL MEDICINE | Facility: CLINIC | Age: 52
End: 2022-05-09
Payer: MEDICAID

## 2022-05-09 DIAGNOSIS — Z30.42 ENCOUNTER FOR DEPO-PROVERA CONTRACEPTION: Primary | ICD-10-CM

## 2022-05-09 PROCEDURE — 99213 OFFICE O/P EST LOW 20 MIN: CPT | Mod: PBBFAC,PO

## 2022-05-09 PROCEDURE — 99999 PR PBB SHADOW E&M-EST. PATIENT-LVL III: CPT | Mod: PBBFAC,,,

## 2022-05-09 PROCEDURE — 99999 PR PBB SHADOW E&M-EST. PATIENT-LVL III: ICD-10-PCS | Mod: PBBFAC,,,

## 2022-05-09 PROCEDURE — 96372 THER/PROPH/DIAG INJ SC/IM: CPT | Mod: PBBFAC,PO

## 2022-05-09 RX ORDER — MEDROXYPROGESTERONE ACETATE 150 MG/ML
150 INJECTION, SUSPENSION INTRAMUSCULAR
Status: COMPLETED | OUTPATIENT
Start: 2022-05-09 | End: 2022-05-09

## 2022-05-09 RX ADMIN — MEDROXYPROGESTERONE ACETATE 150 MG: 150 INJECTION, SUSPENSION INTRAMUSCULAR at 08:05

## 2022-05-09 NOTE — PROGRESS NOTES
Pt here for 3 month depo injection. Depo administered to Lt ventrogluteal site using aspetic technique. Pt tolerated well. Referral placed by Adali to gyn for evaluation of continued depo injections. Pt aware.

## 2022-06-06 ENCOUNTER — TELEPHONE (OUTPATIENT)
Dept: INTERNAL MEDICINE | Facility: CLINIC | Age: 52
End: 2022-06-06
Payer: MEDICAID

## 2022-06-06 DIAGNOSIS — E11.9 TYPE 2 DIABETES MELLITUS WITHOUT COMPLICATION, WITHOUT LONG-TERM CURRENT USE OF INSULIN: ICD-10-CM

## 2022-06-06 NOTE — TELEPHONE ENCOUNTER
----- Message from Fatoumata Keyes sent at 6/6/2022  3:31 PM CDT -----  Contact: Marcie self [phone]   [email or web address]     Patient is calling for Medical Advice regarding:    How long has patient had these symptoms:    Pharmacy name and phone#:    Would like response via [employer]: call back    Comments: Pt is requesting a call back from the nurse regarding a rx for her trulicity

## 2022-06-06 NOTE — TELEPHONE ENCOUNTER
Pt called requesting refills on Trulicity 0.75ml/0.5ml pens. Inject 0.75 mg every 7 days. Not listed on current med list.

## 2022-06-28 ENCOUNTER — LAB VISIT (OUTPATIENT)
Dept: LAB | Facility: HOSPITAL | Age: 52
End: 2022-06-28
Attending: OBSTETRICS & GYNECOLOGY
Payer: MEDICAID

## 2022-06-28 ENCOUNTER — OFFICE VISIT (OUTPATIENT)
Dept: OBSTETRICS AND GYNECOLOGY | Facility: CLINIC | Age: 52
End: 2022-06-28
Payer: MEDICAID

## 2022-06-28 VITALS
WEIGHT: 257.69 LBS | HEIGHT: 68 IN | BODY MASS INDEX: 39.06 KG/M2 | SYSTOLIC BLOOD PRESSURE: 138 MMHG | DIASTOLIC BLOOD PRESSURE: 82 MMHG

## 2022-06-28 DIAGNOSIS — Z12.31 ENCOUNTER FOR SCREENING MAMMOGRAM FOR MALIGNANT NEOPLASM OF BREAST: ICD-10-CM

## 2022-06-28 DIAGNOSIS — Z12.4 CERVICAL CANCER SCREENING: ICD-10-CM

## 2022-06-28 DIAGNOSIS — Z30.42 ENCOUNTER FOR DEPO-PROVERA CONTRACEPTION: ICD-10-CM

## 2022-06-28 DIAGNOSIS — Z11.3 SCREEN FOR STD (SEXUALLY TRANSMITTED DISEASE): ICD-10-CM

## 2022-06-28 DIAGNOSIS — N95.1 HOT FLUSHES, PERIMENOPAUSAL: ICD-10-CM

## 2022-06-28 DIAGNOSIS — Z01.419 WELL WOMAN EXAM WITH ROUTINE GYNECOLOGICAL EXAM: Primary | ICD-10-CM

## 2022-06-28 LAB — FSH SERPL-ACNC: 22.28 MIU/ML

## 2022-06-28 PROCEDURE — 3008F PR BODY MASS INDEX (BMI) DOCUMENTED: ICD-10-PCS | Mod: CPTII,,, | Performed by: OBSTETRICS & GYNECOLOGY

## 2022-06-28 PROCEDURE — 3044F PR MOST RECENT HEMOGLOBIN A1C LEVEL <7.0%: ICD-10-PCS | Mod: CPTII,,, | Performed by: OBSTETRICS & GYNECOLOGY

## 2022-06-28 PROCEDURE — 83001 ASSAY OF GONADOTROPIN (FSH): CPT | Performed by: OBSTETRICS & GYNECOLOGY

## 2022-06-28 PROCEDURE — 88175 CYTOPATH C/V AUTO FLUID REDO: CPT | Performed by: OBSTETRICS & GYNECOLOGY

## 2022-06-28 PROCEDURE — 86803 HEPATITIS C AB TEST: CPT | Performed by: OBSTETRICS & GYNECOLOGY

## 2022-06-28 PROCEDURE — 1159F PR MEDICATION LIST DOCUMENTED IN MEDICAL RECORD: ICD-10-PCS | Mod: CPTII,,, | Performed by: OBSTETRICS & GYNECOLOGY

## 2022-06-28 PROCEDURE — 87591 N.GONORRHOEAE DNA AMP PROB: CPT | Performed by: OBSTETRICS & GYNECOLOGY

## 2022-06-28 PROCEDURE — 99999 PR PBB SHADOW E&M-EST. PATIENT-LVL IV: ICD-10-PCS | Mod: PBBFAC,,, | Performed by: OBSTETRICS & GYNECOLOGY

## 2022-06-28 PROCEDURE — 3008F BODY MASS INDEX DOCD: CPT | Mod: CPTII,,, | Performed by: OBSTETRICS & GYNECOLOGY

## 2022-06-28 PROCEDURE — 99214 OFFICE O/P EST MOD 30 MIN: CPT | Mod: PBBFAC,PO | Performed by: OBSTETRICS & GYNECOLOGY

## 2022-06-28 PROCEDURE — 99386 PR PREVENTIVE VISIT,NEW,40-64: ICD-10-PCS | Mod: S$PBB,,, | Performed by: OBSTETRICS & GYNECOLOGY

## 2022-06-28 PROCEDURE — 3075F SYST BP GE 130 - 139MM HG: CPT | Mod: CPTII,,, | Performed by: OBSTETRICS & GYNECOLOGY

## 2022-06-28 PROCEDURE — 3079F PR MOST RECENT DIASTOLIC BLOOD PRESSURE 80-89 MM HG: ICD-10-PCS | Mod: CPTII,,, | Performed by: OBSTETRICS & GYNECOLOGY

## 2022-06-28 PROCEDURE — 99999 PR PBB SHADOW E&M-EST. PATIENT-LVL IV: CPT | Mod: PBBFAC,,, | Performed by: OBSTETRICS & GYNECOLOGY

## 2022-06-28 PROCEDURE — 4010F PR ACE/ARB THEARPY RXD/TAKEN: ICD-10-PCS | Mod: CPTII,,, | Performed by: OBSTETRICS & GYNECOLOGY

## 2022-06-28 PROCEDURE — 36415 COLL VENOUS BLD VENIPUNCTURE: CPT | Mod: PO | Performed by: OBSTETRICS & GYNECOLOGY

## 2022-06-28 PROCEDURE — 1159F MED LIST DOCD IN RCRD: CPT | Mod: CPTII,,, | Performed by: OBSTETRICS & GYNECOLOGY

## 2022-06-28 PROCEDURE — 3044F HG A1C LEVEL LT 7.0%: CPT | Mod: CPTII,,, | Performed by: OBSTETRICS & GYNECOLOGY

## 2022-06-28 PROCEDURE — 87389 HIV-1 AG W/HIV-1&-2 AB AG IA: CPT | Performed by: OBSTETRICS & GYNECOLOGY

## 2022-06-28 PROCEDURE — 3075F PR MOST RECENT SYSTOLIC BLOOD PRESS GE 130-139MM HG: ICD-10-PCS | Mod: CPTII,,, | Performed by: OBSTETRICS & GYNECOLOGY

## 2022-06-28 PROCEDURE — 3079F DIAST BP 80-89 MM HG: CPT | Mod: CPTII,,, | Performed by: OBSTETRICS & GYNECOLOGY

## 2022-06-28 PROCEDURE — 86592 SYPHILIS TEST NON-TREP QUAL: CPT | Performed by: OBSTETRICS & GYNECOLOGY

## 2022-06-28 PROCEDURE — 4010F ACE/ARB THERAPY RXD/TAKEN: CPT | Mod: CPTII,,, | Performed by: OBSTETRICS & GYNECOLOGY

## 2022-06-28 PROCEDURE — 87624 HPV HI-RISK TYP POOLED RSLT: CPT | Performed by: OBSTETRICS & GYNECOLOGY

## 2022-06-28 PROCEDURE — 87340 HEPATITIS B SURFACE AG IA: CPT | Performed by: OBSTETRICS & GYNECOLOGY

## 2022-06-28 PROCEDURE — 87491 CHLMYD TRACH DNA AMP PROBE: CPT | Performed by: OBSTETRICS & GYNECOLOGY

## 2022-06-28 PROCEDURE — 99386 PREV VISIT NEW AGE 40-64: CPT | Mod: S$PBB,,, | Performed by: OBSTETRICS & GYNECOLOGY

## 2022-06-28 RX ORDER — PEN NEEDLE, DIABETIC 31 GX5/16"
NEEDLE, DISPOSABLE MISCELLANEOUS DAILY
COMMUNITY
Start: 2022-06-03 | End: 2022-06-30

## 2022-06-28 NOTE — PROGRESS NOTES
Subjective:       Patient ID: Marcie Geiger is a 51 y.o. female.    Chief Complaint:  Well Woman (New patient, [facility])      History of Present Illness  HPI  Presents for well-woman exam.  No gyn complaints.  Pt has been on depo provera x 10 years for management of heavy painful periods.  Acheived amenorrhea with it, and would like to continue it.  Last year when she skipped a shot, she started spotting and had significant cramping.  She has been having some hot flushes.  Is not currently sexually active, but would like STD screening.  Last pap: 2019: normal, HPV neg  MM2021: normal  Cologuard 10/2021: negative    GYN & OB History  No LMP recorded. Patient has had an injection.   Date of Last Pap: No result found    OB History    Para Term  AB Living   0 0 0 0 0 0   SAB IAB Ectopic Multiple Live Births   0 0 0 0 0       Review of Systems  Review of Systems   Constitutional: Negative for fatigue, fever and unexpected weight change.   Gastrointestinal: Negative for abdominal pain, bloating, blood in stool, constipation, diarrhea, nausea and vomiting.   Endocrine: Positive for hot flashes.   Genitourinary: Positive for hot flashes. Negative for decreased libido, dyspareunia, dysuria, flank pain, frequency, genital sores, hematuria, pelvic pain, urgency, vaginal bleeding, vaginal discharge, vaginal pain, urinary incontinence, postcoital bleeding, postmenopausal bleeding and vaginal odor.   Integumentary:  Negative for rash, hair changes, breast mass, nipple discharge and breast skin changes.   Psychiatric/Behavioral: Negative for depression. The patient is not nervous/anxious.    Breast: Negative for mass, mastodynia, nipple discharge and skin changes          Objective:    Physical Exam:   Constitutional: She is oriented to person, place, and time. She appears well-developed and well-nourished. No distress.      Neck: No thyromegaly present.     Pulmonary/Chest: Right breast  exhibits no inverted nipple, no mass, no nipple discharge, no skin change, no tenderness, no bleeding and no swelling. Left breast exhibits no inverted nipple, no mass, no nipple discharge, no skin change, no tenderness, no bleeding and no swelling. Breasts are symmetrical.        Abdominal: Soft. She exhibits no distension and no mass. There is no abdominal tenderness. There is no rebound and no guarding.     Genitourinary:    Pelvic exam was performed with patient supine.   There is no rash, tenderness, lesion or injury on the right labia. There is no rash, tenderness, lesion or injury on the left labia. Cervix is normal. Right adnexum displays no mass, no tenderness and no fullness. Left adnexum displays no mass, no tenderness and no fullness. No erythema,  no vaginal discharge, tenderness, bleeding, rectocele or cystocele in the vagina.    No foreign body in the vagina.      No signs of injury in the vagina.   Cervix exhibits no motion tenderness, no discharge and no friability. Uterus is not deviated, not enlarged, not fixed, not tender and not hosting fibroids.               Neurological: She is alert and oriented to person, place, and time.     Psychiatric: She has a normal mood and affect.          Assessment:        1. Well woman exam with routine gynecological exam    2. Encounter for Depo-Provera contraception    3. Cervical cancer screening    4. Encounter for screening mammogram for malignant neoplasm of breast    5. Hot flushes, perimenopausal    6. Screen for STD (sexually transmitted disease)                Plan:      Marcie was seen today for well woman.    Diagnoses and all orders for this visit:    Well woman exam with routine gynecological exam    Encounter for Depo-Provera contraception  -     Ambulatory referral/consult to Gynecology    Cervical cancer screening  -     Liquid-Based Pap Smear, Screening  -     HPV High Risk Genotypes, PCR    Encounter for screening mammogram for malignant  neoplasm of breast  -     Mammo Digital Screening Bilat w/ Richy; Future    Hot flushes, perimenopausal  -     FOLLICLE STIMULATING HORMONE; Future    Screen for STD (sexually transmitted disease)  -     C. trachomatis/N. gonorrhoeae by AMP DNA  -     HIV 1/2 Ag/Ab (4th Gen); Future  -     RPR; Future  -     Hepatitis B Surface Antigen; Future  -     Hepatitis C Antibody; Future    Reviewed updated recommendations for pap smears (every 3 years) in low risk patients.   Recommend annual pelvic exams.  Reviewed recommendations for annual CBE and annual MMG.  If FSH in postmenopausal range, will d/c depo provera.  If FSH low, will continue depo provera and reassess next year.

## 2022-06-29 LAB
C TRACH DNA SPEC QL NAA+PROBE: NOT DETECTED
HBV SURFACE AG SERPL QL IA: NEGATIVE
HCV AB SERPL QL IA: NEGATIVE
HIV 1+2 AB+HIV1 P24 AG SERPL QL IA: NEGATIVE
N GONORRHOEA DNA SPEC QL NAA+PROBE: NOT DETECTED
RPR SER QL: NORMAL

## 2022-06-29 NOTE — TELEPHONE ENCOUNTER
Refill Routing Note   Medication(s) are not appropriate for processing by [facility] for the following reason(s):      - Non-participating provider    [facility] action(s):  Defer          Medication reconciliation completed: No     Appointments  past 12m or future 3m with PCP    Date Provider   Last Visit   3/21/2022 Adali Sibley NP   Next Visit   9/26/2022 Adali Sibley NP   ED visits in past 90 days: 0        Note composed:3:17 PM 06/29/2022

## 2022-06-30 RX ORDER — ATORVASTATIN CALCIUM 20 MG/1
TABLET, FILM COATED ORAL
Qty: 90 TABLET | Refills: 3 | Status: SHIPPED | OUTPATIENT
Start: 2022-06-30 | End: 2023-06-16

## 2022-06-30 RX ORDER — PEN NEEDLE, DIABETIC 31 GX5/16"
NEEDLE, DISPOSABLE MISCELLANEOUS
Qty: 100 EACH | Refills: 11 | Status: SHIPPED | OUTPATIENT
Start: 2022-06-30 | End: 2022-09-26

## 2022-07-02 LAB
FINAL PATHOLOGIC DIAGNOSIS: NORMAL
Lab: NORMAL

## 2022-07-05 LAB
HPV HR 12 DNA SPEC QL NAA+PROBE: NEGATIVE
HPV16 AG SPEC QL: NEGATIVE
HPV18 DNA SPEC QL NAA+PROBE: NEGATIVE

## 2022-09-06 LAB
LEFT EYE DM RETINOPATHY: NEGATIVE
RIGHT EYE DM RETINOPATHY: NEGATIVE

## 2022-09-19 ENCOUNTER — TELEPHONE (OUTPATIENT)
Dept: INTERNAL MEDICINE | Facility: CLINIC | Age: 52
End: 2022-09-19
Payer: MEDICAID

## 2022-09-19 NOTE — TELEPHONE ENCOUNTER
----- Message from Zaheer Gallagher sent at 9/19/2022 10:10 AM CDT -----  Contact: Patient  Marcie Geiger would like a call back at [phone], in regards to her appointment on 9/26/22. Pt would like to get an earlier time. Pt states she would like to come around 7 a.m or 7:30.

## 2022-09-21 ENCOUNTER — HOSPITAL ENCOUNTER (OUTPATIENT)
Dept: RADIOLOGY | Facility: HOSPITAL | Age: 52
Discharge: HOME | End: 2022-09-21
Attending: OBSTETRICS & GYNECOLOGY
Payer: MEDICAID

## 2022-09-21 VITALS — HEIGHT: 68 IN | WEIGHT: 257.69 LBS | BODY MASS INDEX: 39.06 KG/M2

## 2022-09-21 DIAGNOSIS — Z12.31 ENCOUNTER FOR SCREENING MAMMOGRAM FOR MALIGNANT NEOPLASM OF BREAST: ICD-10-CM

## 2022-09-21 PROCEDURE — 77063 MAMMO DIGITAL SCREENING BILAT WITH TOMO: ICD-10-PCS | Mod: 26,,, | Performed by: RADIOLOGY

## 2022-09-21 PROCEDURE — 77067 SCR MAMMO BI INCL CAD: CPT | Mod: TC,PO

## 2022-09-21 PROCEDURE — 77067 MAMMO DIGITAL SCREENING BILAT WITH TOMO: ICD-10-PCS | Mod: 26,,, | Performed by: RADIOLOGY

## 2022-09-21 PROCEDURE — 77063 BREAST TOMOSYNTHESIS BI: CPT | Mod: TC,PO

## 2022-09-21 PROCEDURE — 77063 BREAST TOMOSYNTHESIS BI: CPT | Mod: 26,,, | Performed by: RADIOLOGY

## 2022-09-21 PROCEDURE — 77067 SCR MAMMO BI INCL CAD: CPT | Mod: 26,,, | Performed by: RADIOLOGY

## 2022-09-26 ENCOUNTER — OFFICE VISIT (OUTPATIENT)
Dept: INTERNAL MEDICINE | Facility: CLINIC | Age: 52
End: 2022-09-26
Payer: MEDICAID

## 2022-09-26 VITALS
TEMPERATURE: 98 F | SYSTOLIC BLOOD PRESSURE: 138 MMHG | HEART RATE: 100 BPM | OXYGEN SATURATION: 96 % | DIASTOLIC BLOOD PRESSURE: 86 MMHG | WEIGHT: 263.88 LBS | BODY MASS INDEX: 40.12 KG/M2

## 2022-09-26 DIAGNOSIS — I10 ESSENTIAL HYPERTENSION: ICD-10-CM

## 2022-09-26 DIAGNOSIS — E66.01 MORBID OBESITY WITH BODY MASS INDEX (BMI) OF 40.0 TO 49.9: ICD-10-CM

## 2022-09-26 DIAGNOSIS — E11.9 TYPE 2 DIABETES MELLITUS WITHOUT COMPLICATION, WITHOUT LONG-TERM CURRENT USE OF INSULIN: Primary | ICD-10-CM

## 2022-09-26 DIAGNOSIS — R79.89 ELEVATED LFTS: Primary | ICD-10-CM

## 2022-09-26 DIAGNOSIS — R74.01 ELEVATED ALT MEASUREMENT: ICD-10-CM

## 2022-09-26 DIAGNOSIS — Z23 INFLUENZA VACCINE ADMINISTERED: ICD-10-CM

## 2022-09-26 DIAGNOSIS — H10.12 ACUTE ATOPIC CONJUNCTIVITIS OF LEFT EYE: ICD-10-CM

## 2022-09-26 PROCEDURE — 3060F PR POS MICROALBUMINURIA RESULT DOCUMENTED/REVIEW: ICD-10-PCS | Mod: CPTII,,, | Performed by: NURSE PRACTITIONER

## 2022-09-26 PROCEDURE — 90686 IIV4 VACC NO PRSV 0.5 ML IM: CPT | Mod: PBBFAC,PO

## 2022-09-26 PROCEDURE — 1159F PR MEDICATION LIST DOCUMENTED IN MEDICAL RECORD: ICD-10-PCS | Mod: CPTII,,, | Performed by: NURSE PRACTITIONER

## 2022-09-26 PROCEDURE — 1160F PR REVIEW ALL MEDS BY PRESCRIBER/CLIN PHARMACIST DOCUMENTED: ICD-10-PCS | Mod: CPTII,,, | Performed by: NURSE PRACTITIONER

## 2022-09-26 PROCEDURE — 4010F PR ACE/ARB THEARPY RXD/TAKEN: ICD-10-PCS | Mod: CPTII,,, | Performed by: NURSE PRACTITIONER

## 2022-09-26 PROCEDURE — 3075F PR MOST RECENT SYSTOLIC BLOOD PRESS GE 130-139MM HG: ICD-10-PCS | Mod: CPTII,,, | Performed by: NURSE PRACTITIONER

## 2022-09-26 PROCEDURE — 99999 PR PBB SHADOW E&M-EST. PATIENT-LVL IV: ICD-10-PCS | Mod: PBBFAC,,, | Performed by: NURSE PRACTITIONER

## 2022-09-26 PROCEDURE — 3060F POS MICROALBUMINURIA REV: CPT | Mod: CPTII,,, | Performed by: NURSE PRACTITIONER

## 2022-09-26 PROCEDURE — 3079F DIAST BP 80-89 MM HG: CPT | Mod: CPTII,,, | Performed by: NURSE PRACTITIONER

## 2022-09-26 PROCEDURE — 99214 PR OFFICE/OUTPT VISIT, EST, LEVL IV, 30-39 MIN: ICD-10-PCS | Mod: S$PBB,,, | Performed by: NURSE PRACTITIONER

## 2022-09-26 PROCEDURE — 3008F BODY MASS INDEX DOCD: CPT | Mod: CPTII,,, | Performed by: NURSE PRACTITIONER

## 2022-09-26 PROCEDURE — 3008F PR BODY MASS INDEX (BMI) DOCUMENTED: ICD-10-PCS | Mod: CPTII,,, | Performed by: NURSE PRACTITIONER

## 2022-09-26 PROCEDURE — 3075F SYST BP GE 130 - 139MM HG: CPT | Mod: CPTII,,, | Performed by: NURSE PRACTITIONER

## 2022-09-26 PROCEDURE — 3066F NEPHROPATHY DOC TX: CPT | Mod: CPTII,,, | Performed by: NURSE PRACTITIONER

## 2022-09-26 PROCEDURE — 1160F RVW MEDS BY RX/DR IN RCRD: CPT | Mod: CPTII,,, | Performed by: NURSE PRACTITIONER

## 2022-09-26 PROCEDURE — 3044F PR MOST RECENT HEMOGLOBIN A1C LEVEL <7.0%: ICD-10-PCS | Mod: CPTII,,, | Performed by: NURSE PRACTITIONER

## 2022-09-26 PROCEDURE — 1159F MED LIST DOCD IN RCRD: CPT | Mod: CPTII,,, | Performed by: NURSE PRACTITIONER

## 2022-09-26 PROCEDURE — 3079F PR MOST RECENT DIASTOLIC BLOOD PRESSURE 80-89 MM HG: ICD-10-PCS | Mod: CPTII,,, | Performed by: NURSE PRACTITIONER

## 2022-09-26 PROCEDURE — 3066F PR DOCUMENTATION OF TREATMENT FOR NEPHROPATHY: ICD-10-PCS | Mod: CPTII,,, | Performed by: NURSE PRACTITIONER

## 2022-09-26 PROCEDURE — 99214 OFFICE O/P EST MOD 30 MIN: CPT | Mod: PBBFAC,PO | Performed by: NURSE PRACTITIONER

## 2022-09-26 PROCEDURE — 3044F HG A1C LEVEL LT 7.0%: CPT | Mod: CPTII,,, | Performed by: NURSE PRACTITIONER

## 2022-09-26 PROCEDURE — 4010F ACE/ARB THERAPY RXD/TAKEN: CPT | Mod: CPTII,,, | Performed by: NURSE PRACTITIONER

## 2022-09-26 PROCEDURE — 99999 PR PBB SHADOW E&M-EST. PATIENT-LVL IV: CPT | Mod: PBBFAC,,, | Performed by: NURSE PRACTITIONER

## 2022-09-26 PROCEDURE — 99214 OFFICE O/P EST MOD 30 MIN: CPT | Mod: S$PBB,,, | Performed by: NURSE PRACTITIONER

## 2022-09-26 RX ORDER — OLOPATADINE HYDROCHLORIDE 1 MG/ML
1 SOLUTION/ DROPS OPHTHALMIC 2 TIMES DAILY
Qty: 5 ML | Refills: 5 | Status: SHIPPED | OUTPATIENT
Start: 2022-09-26 | End: 2023-03-20 | Stop reason: SDUPTHER

## 2022-09-26 NOTE — ASSESSMENT & PLAN NOTE
BP slightly elevated today in clinic. Did not take BP medications today. Continue current medications as prescribed.

## 2022-09-26 NOTE — PROGRESS NOTES
Subjective:       Patient ID: Marcie Geiger is a 51 y.o. female.    Chief Complaint: Follow-up    Mrs. Geiger returns to clinic for DM/HTN follow up. Reports compliance with medications. Labs completed prior visit. Reports eye exam completed recently at [facility]. She has been approved to proceed with bariatric surgery. Surgery date has not been scheduled yet. She is without complaint today.      Diabetes  She presents for her follow-up diabetic visit. She has type 2 diabetes mellitus. Her disease course has been stable. There are no hypoglycemic associated symptoms. Pertinent negatives for hypoglycemia include no dizziness or nervousness/anxiousness. Pertinent negatives for diabetes include no blurred vision, no foot paresthesias, no foot ulcerations, no polydipsia, no polyphagia, no polyuria and no weight loss. There are no hypoglycemic complications. Symptoms are stable. Current diabetic treatment includes oral agent (monotherapy). She is compliant with treatment all of the time. There is no change in her home blood glucose trend. Her breakfast blood glucose range is generally  mg/dl. An ACE inhibitor/angiotensin II receptor blocker is being taken. She does not see a podiatrist.Eye exam is current (reports recently completed at [facility]).     Diabetes Management Status    Statin: Taking  ACE/ARB: Taking    Screening or Prevention Patient's value Goal Complete/Controlled?   HgA1C Testing and Control   Lab Results   Component Value Date    HGBA1C 5.8 (H) 09/21/2022      Annually/Less than 8% Yes     Lipid profile : 09/21/2022 Annually Yes     LDL control Lab Results   Component Value Date    LDLCALC 77.4 09/21/2022    Annually/Less than 100 mg/dl  Yes     Nephropathy screening Lab Results   Component Value Date    LABMICR 47.0 09/09/2021     Lab Results   Component Value Date    PROTEINUA Negative 09/04/2018     No results found for: UTPCR   Annually No     Blood pressure BP  Readings from Last 1 Encounters:   09/26/22 138/86    Less than 140/90 Yes     Dilated retinal exam : 09/27/2021 Annually Yes     Foot exam   : 03/21/2022 Annually Yes        Patient Active Problem List   Diagnosis    Type 2 diabetes mellitus without complication, without long-term current use of insulin    Morbid obesity with body mass index (BMI) of 40.0 to 49.9    Gastroesophageal reflux disease without esophagitis    Encounter for Depo-Provera contraception    Atopic dermatitis    Acute atopic conjunctivitis of left eye    Essential hypertension       Family History   Problem Relation Age of Onset    Diabetes Mother     Heart disease Mother     Heart attack Father     Heart disease Father     Diabetes Brother     Hypertension Brother     Breast cancer Paternal Aunt     Colon cancer Neg Hx     Ovarian cancer Neg Hx      History reviewed. No pertinent surgical history.      Current Outpatient Medications:     atorvastatin (LIPITOR) 20 MG tablet, TAKE 1 TABLET(20 MG) BY MOUTH EVERY DAY, Disp: 90 tablet, Rfl: 3    blood-glucose meter (TRUE METRIX GLUCOSE METER) Misc, USE TWO TIMES A DAY, Disp: 1 each, Rfl: 0    cetirizine (ZYRTEC) 10 MG tablet, TAKE 1 TABLET BY MOUTH ONCE DAILY, Disp: 30 tablet, Rfl: 2    losartan (COZAAR) 50 MG tablet, Take 1 tablet (50 mg total) by mouth once daily., Disp: 90 tablet, Rfl: 3    metFORMIN (GLUCOPHAGE) 500 MG tablet, TAKE 1 TABLET(500 MG) BY MOUTH TWICE DAILY WITH MEALS, Disp: 180 tablet, Rfl: 4    omeprazole (PRILOSEC) 20 MG capsule, Take 1 capsule (20 mg total) by mouth once daily., Disp: 30 capsule, Rfl: 11    TRUE METRIX GLUCOSE TEST STRIP Strp, USE TO TEST TWICE DAILY., Disp: 100 strip, Rfl: 11    TRUEPLUS LANCETS 33 gauge Misc, Apply topically 2 (two) times daily., Disp: , Rfl:     olopatadine (PATANOL) 0.1 % ophthalmic solution, Place 1 drop into the left eye 2 (two) times daily., Disp: 5 mL, Rfl: 5    Review of Systems   Constitutional:  Negative for appetite change and  weight loss.   Eyes:  Negative for blurred vision.   Respiratory:  Negative for chest tightness and shortness of breath.    Cardiovascular:  Negative for palpitations and leg swelling.   Gastrointestinal:  Negative for diarrhea.   Endocrine: Negative for polydipsia, polyphagia and polyuria.   Musculoskeletal:  Negative for gait problem.   Neurological:  Negative for dizziness, syncope and light-headedness.   Psychiatric/Behavioral:  Negative for dysphoric mood. The patient is not nervous/anxious.      Objective:   /86   Pulse 100   Temp 98.1 °F (36.7 °C)   Wt 119.7 kg (263 lb 14.3 oz)   SpO2 96%   BMI 40.12 kg/m²      Physical Exam  Constitutional:       General: She is not in acute distress.     Appearance: Normal appearance. She is not ill-appearing.   Cardiovascular:      Rate and Rhythm: Normal rate and regular rhythm.      Pulses: Normal pulses.      Heart sounds: Normal heart sounds. No murmur heard.    No friction rub. No gallop.   Pulmonary:      Effort: Pulmonary effort is normal. No respiratory distress.      Breath sounds: Normal breath sounds. No wheezing.   Musculoskeletal:         General: Normal range of motion.      Cervical back: Normal range of motion.   Skin:     General: Skin is warm and dry.      Coloration: Skin is not pale.      Findings: No erythema.   Neurological:      Mental Status: She is alert and oriented to person, place, and time.   Psychiatric:         Mood and Affect: Mood normal.         Behavior: Behavior normal.       Assessment & Plan     Problem List Items Addressed This Visit          Ophtho    Acute atopic conjunctivitis of left eye    Current Assessment & Plan     Stable.          Relevant Medications    olopatadine (PATANOL) 0.1 % ophthalmic solution       Cardiac/Vascular    Essential hypertension    Current Assessment & Plan     BP slightly elevated today in clinic. Did not take BP medications today. Continue current medications as prescribed.              "Endocrine    Type 2 diabetes mellitus without complication, without long-term current use of insulin - Primary    Current Assessment & Plan     Stable. Continue current medications as prescribed.         Relevant Orders    Microalbumin/Creatinine Ratio, Urine    Morbid obesity with body mass index (BMI) of 40.0 to 49.9    Current Assessment & Plan     Counseled on importance of diet and exercise. Encouraged patient to have an active lifestyle with regular exercise with healthy eating. Discussed the potential complications associated with obesity such as bone and joint pain, HTN, diabetes, and hyperlipidemia.          Other Visit Diagnoses       Elevated ALT measurement        Reviewed labs completed prior to visit. ALT slightly elevated. Repeat hepatic function today. Advised to avoid Tylenol/EOTH use.    Influenza vaccine administered        Relevant Orders    Influenza - Quadrivalent (PF) (Completed)              MANUEL Albright    Portions of this note may have been created with voice recognition software. Occasional "wrong-word" or "sound-a-like" substitutions may have occurred due to the inherent limitations of voice recognition software. Please, read the note carefully and recognize, using context, where substitutions have occurred.       "

## 2022-09-27 DIAGNOSIS — R79.89 ELEVATED LFTS: Primary | ICD-10-CM

## 2022-10-04 ENCOUNTER — HOSPITAL ENCOUNTER (OUTPATIENT)
Dept: RADIOLOGY | Facility: HOSPITAL | Age: 52
Discharge: HOME | End: 2022-10-04
Attending: NURSE PRACTITIONER
Payer: MEDICAID

## 2022-10-04 DIAGNOSIS — R79.89 ELEVATED LFTS: ICD-10-CM

## 2022-10-04 DIAGNOSIS — K76.89 HEPATIC CYST: Primary | ICD-10-CM

## 2022-10-04 DIAGNOSIS — R16.0 HEPATOMEGALY: ICD-10-CM

## 2022-10-04 PROCEDURE — 76705 ECHO EXAM OF ABDOMEN: CPT | Mod: TC,PO

## 2022-10-04 PROCEDURE — 76705 US ABDOMEN LIMITED: ICD-10-PCS | Mod: 26,,, | Performed by: RADIOLOGY

## 2022-10-04 PROCEDURE — 76705 ECHO EXAM OF ABDOMEN: CPT | Mod: 26,,, | Performed by: RADIOLOGY

## 2022-10-18 ENCOUNTER — PATIENT MESSAGE (OUTPATIENT)
Dept: ADMINISTRATIVE | Facility: HOSPITAL | Age: 52
End: 2022-10-18
Payer: MEDICAID

## 2022-11-26 DIAGNOSIS — I10 ESSENTIAL HYPERTENSION: ICD-10-CM

## 2022-11-28 RX ORDER — LOSARTAN POTASSIUM 25 MG/1
TABLET ORAL
Qty: 90 TABLET | Refills: 3 | OUTPATIENT
Start: 2022-11-28

## 2022-12-29 ENCOUNTER — HOSPITAL ENCOUNTER (EMERGENCY)
Facility: HOSPITAL | Age: 52
Discharge: HOME | End: 2022-12-29
Attending: EMERGENCY MEDICINE
Payer: MEDICAID

## 2022-12-29 VITALS
RESPIRATION RATE: 18 BRPM | TEMPERATURE: 98 F | WEIGHT: 250.25 LBS | OXYGEN SATURATION: 97 % | HEART RATE: 84 BPM | HEIGHT: 68 IN | BODY MASS INDEX: 37.93 KG/M2 | SYSTOLIC BLOOD PRESSURE: 146 MMHG | DIASTOLIC BLOOD PRESSURE: 85 MMHG

## 2022-12-29 DIAGNOSIS — L03.011 PARONYCHIA OF RIGHT INDEX FINGER: Primary | ICD-10-CM

## 2022-12-29 DIAGNOSIS — M79.644 PAIN IN FINGER OF RIGHT HAND: ICD-10-CM

## 2022-12-29 PROCEDURE — 10060 I&D ABSCESS SIMPLE/SINGLE: CPT | Mod: ER

## 2022-12-29 PROCEDURE — 25000003 PHARM REV CODE 250: Mod: ER | Performed by: EMERGENCY MEDICINE

## 2022-12-29 PROCEDURE — 99283 EMERGENCY DEPT VISIT LOW MDM: CPT | Mod: 25,ER

## 2022-12-29 RX ORDER — CLINDAMYCIN HYDROCHLORIDE 300 MG/1
300 CAPSULE ORAL 4 TIMES DAILY
Qty: 28 CAPSULE | Refills: 0 | Status: SHIPPED | OUTPATIENT
Start: 2022-12-29 | End: 2023-01-05

## 2022-12-29 RX ORDER — LIDOCAINE HYDROCHLORIDE 20 MG/ML
5 INJECTION, SOLUTION EPIDURAL; INFILTRATION; INTRACAUDAL; PERINEURAL
Status: COMPLETED | OUTPATIENT
Start: 2022-12-29 | End: 2022-12-29

## 2022-12-29 RX ADMIN — LIDOCAINE HYDROCHLORIDE 100 MG: 20 INJECTION, SOLUTION EPIDURAL; INFILTRATION; INTRACAUDAL; PERINEURAL at 08:12

## 2022-12-29 NOTE — ED PROVIDER NOTES
Encounter Date: 2022       History     Chief Complaint   Patient presents with    Finger Pain     Redness and swelling to R index finger nailbed since yesterday      HPI  Marcieleland Geiger is a 52 y.o. female who presents with 2 days of gradual onset, moderate, throbbing, worsening right index finger pain with associated redness and swelling around the nail. She's had a similar prior episode.  She has no other complaints.          Review of patient's allergies indicates:  No Known Allergies  Past Medical History:   Diagnosis Date    Diabetes mellitus     Diabetes mellitus, type 2     GERD (gastroesophageal reflux disease)     Hyperlipidemia      No past surgical history on file.  Family History   Problem Relation Age of Onset    Diabetes Mother     Heart disease Mother     Heart attack Father     Heart disease Father     Diabetes Brother     Hypertension Brother     Breast cancer Paternal Aunt     Colon cancer Neg Hx     Ovarian cancer Neg Hx      Social History     Tobacco Use    Smoking status: Former     Packs/day: 0.50     Years: 2.00     Pack years: 1.00     Types: Cigarettes     Quit date:      Years since quittin.0    Smokeless tobacco: Never   Substance Use Topics    Alcohol use: No    Drug use: No     Review of Systems   Constitutional: Negative.  Negative for chills and fever.   HENT: Negative.     Eyes: Negative.    Respiratory: Negative.     Cardiovascular: Negative.    Gastrointestinal: Negative.    Endocrine: Negative.    Genitourinary: Negative.    Musculoskeletal: Negative.    Skin: Negative.    Allergic/Immunologic: Negative.    Neurological: Negative.  Negative for weakness and numbness.   Hematological: Negative.    Psychiatric/Behavioral: Negative.     All other systems reviewed and are negative.    Physical Exam     Initial Vitals [22 0816]   BP Pulse Resp Temp SpO2   (!) 146/85 84 18 98.1 °F (36.7 °C) 97 %      MAP       --         Physical Exam    Nursing note and  vitals reviewed.  Constitutional: She appears well-developed and well-nourished. No distress.   HENT:   Head: Normocephalic and atraumatic.   Eyes: Conjunctivae and EOM are normal. Pupils are equal, round, and reactive to light.   Neck: Neck supple.   Cardiovascular:  Normal rate, regular rhythm and intact distal pulses.           Pulmonary/Chest: No respiratory distress.   Abdominal: She exhibits no distension.   Musculoskeletal:         General: Normal range of motion.      Cervical back: Neck supple.      Comments: Right index finger radial side fluctuant, tender, erythematous paronychial abscess     Neurological: She is alert and oriented to person, place, and time. She has normal strength.   Skin: Skin is warm and dry. Capillary refill takes less than 2 seconds.   Psychiatric: She has a normal mood and affect.       ED Course   I & D - Incision and Drainage, paronychia    Date/Time: 12/29/2022 11:06 PM  Location procedure was performed: [facility] EMERGENCY DEPARTMENT  Performed by: Scott Nobles MD  Authorized by: Scott Nobles MD   Consent Done: Yes  Consent: Written consent obtained.  Type: abscess  Body area: upper extremity  Location details: right index finger  Anesthesia: digital block    Anesthesia:  Local Anesthetic: lidocaine 2% without epinephrine  Anesthetic total: 2 mL    Patient sedated: no  Scalpel size: 11  Incision type: single straight  Complexity: simple  Drainage: purulent  Drainage amount: copious  Wound treatment: incision, drainage and expression of material  Complications: No  Specimens: No  Implants: No      Labs Reviewed - No data to display       Imaging Results    None          Medications   LIDOcaine (PF) 20 mg/mL (2%) injection 100 mg (has no administration in time range)                     No results found for this or any previous visit (from the past 24 hour(s)).    Patient's evaluation in the ED does not suggest any emergent or life threatening medical conditions  requiring immediate intervention beyond what was provided in the ED, and I believe patient is safe for discharge.  Regardless, an unremarkable evaluation in the ED does not preclude the development or presence of a serious or life threatening condition. As such, patient was given return instructions for any change or worsening in symptoms.           Clinical Impression:   Final diagnoses:  [L03.011] Paronychia of right index finger (Primary)  [M79.644] Pain in finger of right hand               Scott Nobles MD  12/29/22 8411

## 2022-12-30 ENCOUNTER — TELEPHONE (OUTPATIENT)
Dept: HEPATOLOGY | Facility: CLINIC | Age: 52
End: 2022-12-30
Payer: MEDICAID

## 2022-12-30 NOTE — TELEPHONE ENCOUNTER
Referral to hepatology for liver cysts, fatty liver. Called pt to schedule appt with BESSIE Goldberg NP    Scheduling attempt # 1    Offered both in person or virtual visits. Pt does not wish to schedule at this time. Pt reports she will schedule online on her own when she is ready, does not wish for further assistance  Will defer referral until she schedules

## 2023-02-09 ENCOUNTER — TELEPHONE (OUTPATIENT)
Dept: INTERNAL MEDICINE | Facility: CLINIC | Age: 53
End: 2023-02-09
Payer: MEDICAID

## 2023-02-09 NOTE — TELEPHONE ENCOUNTER
----- Message from Tamiko Cavazos sent at 2/9/2023  9:19 AM CST -----  Contact: Self - [phone]  Patient called in requesting an earlier time on her appt scheduled 03/27. Please give her a call back at [phone]. Thanks

## 2023-03-13 DIAGNOSIS — I10 ESSENTIAL HYPERTENSION: ICD-10-CM

## 2023-03-13 RX ORDER — LOSARTAN POTASSIUM 50 MG/1
TABLET ORAL
Qty: 90 TABLET | Refills: 3 | Status: SHIPPED | OUTPATIENT
Start: 2023-03-13 | End: 2023-07-07 | Stop reason: CLARIF

## 2023-03-20 DIAGNOSIS — H10.12 ACUTE ATOPIC CONJUNCTIVITIS OF LEFT EYE: ICD-10-CM

## 2023-03-20 RX ORDER — OLOPATADINE HYDROCHLORIDE 1 MG/ML
1 SOLUTION/ DROPS OPHTHALMIC 2 TIMES DAILY
Qty: 5 ML | Refills: 0 | Status: SHIPPED | OUTPATIENT
Start: 2023-03-20 | End: 2023-04-17

## 2023-03-27 ENCOUNTER — LAB VISIT (OUTPATIENT)
Dept: LAB | Facility: HOSPITAL | Age: 53
End: 2023-03-27
Attending: NURSE PRACTITIONER
Payer: MEDICAID

## 2023-03-27 ENCOUNTER — PATIENT OUTREACH (OUTPATIENT)
Dept: ADMINISTRATIVE | Facility: HOSPITAL | Age: 53
End: 2023-03-27
Payer: MEDICAID

## 2023-03-27 ENCOUNTER — OFFICE VISIT (OUTPATIENT)
Dept: INTERNAL MEDICINE | Facility: CLINIC | Age: 53
End: 2023-03-27
Payer: MEDICAID

## 2023-03-27 VITALS
TEMPERATURE: 98 F | WEIGHT: 232.13 LBS | HEART RATE: 86 BPM | BODY MASS INDEX: 35.18 KG/M2 | OXYGEN SATURATION: 97 % | DIASTOLIC BLOOD PRESSURE: 84 MMHG | SYSTOLIC BLOOD PRESSURE: 136 MMHG | HEIGHT: 68 IN

## 2023-03-27 DIAGNOSIS — Z23 PNEUMOCOCCAL VACCINE ADMINISTERED: ICD-10-CM

## 2023-03-27 DIAGNOSIS — I10 ESSENTIAL HYPERTENSION: ICD-10-CM

## 2023-03-27 DIAGNOSIS — I10 ESSENTIAL HYPERTENSION: Primary | ICD-10-CM

## 2023-03-27 DIAGNOSIS — K21.9 GASTROESOPHAGEAL REFLUX DISEASE WITHOUT ESOPHAGITIS: ICD-10-CM

## 2023-03-27 DIAGNOSIS — E11.9 TYPE 2 DIABETES MELLITUS WITHOUT COMPLICATION, WITHOUT LONG-TERM CURRENT USE OF INSULIN: ICD-10-CM

## 2023-03-27 DIAGNOSIS — E66.01 SEVERE OBESITY (BMI 35.0-35.9 WITH COMORBIDITY): ICD-10-CM

## 2023-03-27 PROBLEM — Z30.42 ENCOUNTER FOR DEPO-PROVERA CONTRACEPTION: Status: RESOLVED | Noted: 2020-02-14 | Resolved: 2023-03-27

## 2023-03-27 LAB
ALBUMIN SERPL BCP-MCNC: 3.9 G/DL (ref 3.5–5.2)
ALP SERPL-CCNC: 85 U/L (ref 55–135)
ALT SERPL W/O P-5'-P-CCNC: 42 U/L (ref 10–44)
ANION GAP SERPL CALC-SCNC: 10 MMOL/L (ref 8–16)
AST SERPL-CCNC: 30 U/L (ref 10–40)
BILIRUB SERPL-MCNC: 0.4 MG/DL (ref 0.1–1)
BUN SERPL-MCNC: 7 MG/DL (ref 6–20)
CALCIUM SERPL-MCNC: 9.5 MG/DL (ref 8.7–10.5)
CHLORIDE SERPL-SCNC: 105 MMOL/L (ref 95–110)
CO2 SERPL-SCNC: 27 MMOL/L (ref 23–29)
CREAT SERPL-MCNC: 0.8 MG/DL (ref 0.5–1.4)
EST. GFR  (NO RACE VARIABLE): >60 ML/MIN/1.73 M^2
GLUCOSE SERPL-MCNC: 88 MG/DL (ref 70–110)
POTASSIUM SERPL-SCNC: 4 MMOL/L (ref 3.5–5.1)
PROT SERPL-MCNC: 7.5 G/DL (ref 6–8.4)
SODIUM SERPL-SCNC: 142 MMOL/L (ref 136–145)

## 2023-03-27 PROCEDURE — 99999 PR PBB SHADOW E&M-EST. PATIENT-LVL V: CPT | Mod: PBBFAC,,, | Performed by: NURSE PRACTITIONER

## 2023-03-27 PROCEDURE — 1159F PR MEDICATION LIST DOCUMENTED IN MEDICAL RECORD: ICD-10-PCS | Mod: CPTII,,, | Performed by: NURSE PRACTITIONER

## 2023-03-27 PROCEDURE — 83036 HEMOGLOBIN GLYCOSYLATED A1C: CPT | Performed by: NURSE PRACTITIONER

## 2023-03-27 PROCEDURE — 99214 OFFICE O/P EST MOD 30 MIN: CPT | Mod: S$PBB,,, | Performed by: NURSE PRACTITIONER

## 2023-03-27 PROCEDURE — 3079F PR MOST RECENT DIASTOLIC BLOOD PRESSURE 80-89 MM HG: ICD-10-PCS | Mod: CPTII,,, | Performed by: NURSE PRACTITIONER

## 2023-03-27 PROCEDURE — 4010F PR ACE/ARB THEARPY RXD/TAKEN: ICD-10-PCS | Mod: CPTII,,, | Performed by: NURSE PRACTITIONER

## 2023-03-27 PROCEDURE — 4010F ACE/ARB THERAPY RXD/TAKEN: CPT | Mod: CPTII,,, | Performed by: NURSE PRACTITIONER

## 2023-03-27 PROCEDURE — 36415 COLL VENOUS BLD VENIPUNCTURE: CPT | Mod: PO | Performed by: NURSE PRACTITIONER

## 2023-03-27 PROCEDURE — 3075F SYST BP GE 130 - 139MM HG: CPT | Mod: CPTII,,, | Performed by: NURSE PRACTITIONER

## 2023-03-27 PROCEDURE — 99214 PR OFFICE/OUTPT VISIT, EST, LEVL IV, 30-39 MIN: ICD-10-PCS | Mod: S$PBB,,, | Performed by: NURSE PRACTITIONER

## 2023-03-27 PROCEDURE — 3008F PR BODY MASS INDEX (BMI) DOCUMENTED: ICD-10-PCS | Mod: CPTII,,, | Performed by: NURSE PRACTITIONER

## 2023-03-27 PROCEDURE — 99215 OFFICE O/P EST HI 40 MIN: CPT | Mod: PBBFAC,PO | Performed by: NURSE PRACTITIONER

## 2023-03-27 PROCEDURE — 90677 PCV20 VACCINE IM: CPT | Mod: PBBFAC,PO

## 2023-03-27 PROCEDURE — 99999 PR PBB SHADOW E&M-EST. PATIENT-LVL V: ICD-10-PCS | Mod: PBBFAC,,, | Performed by: NURSE PRACTITIONER

## 2023-03-27 PROCEDURE — 3075F PR MOST RECENT SYSTOLIC BLOOD PRESS GE 130-139MM HG: ICD-10-PCS | Mod: CPTII,,, | Performed by: NURSE PRACTITIONER

## 2023-03-27 PROCEDURE — 80053 COMPREHEN METABOLIC PANEL: CPT | Mod: PO | Performed by: NURSE PRACTITIONER

## 2023-03-27 PROCEDURE — 3079F DIAST BP 80-89 MM HG: CPT | Mod: CPTII,,, | Performed by: NURSE PRACTITIONER

## 2023-03-27 PROCEDURE — 3008F BODY MASS INDEX DOCD: CPT | Mod: CPTII,,, | Performed by: NURSE PRACTITIONER

## 2023-03-27 PROCEDURE — 1159F MED LIST DOCD IN RCRD: CPT | Mod: CPTII,,, | Performed by: NURSE PRACTITIONER

## 2023-03-27 RX ORDER — ACETAMINOPHEN 500 MG
1 TABLET ORAL DAILY
Qty: 1 EACH | Refills: 0 | Status: SHIPPED | OUTPATIENT
Start: 2023-03-27

## 2023-03-27 RX ORDER — PANTOPRAZOLE SODIUM 40 MG/1
40 TABLET, DELAYED RELEASE ORAL 2 TIMES DAILY
COMMUNITY
Start: 2022-11-01 | End: 2023-03-27

## 2023-03-27 RX ORDER — DULAGLUTIDE 0.75 MG/.5ML
0.75 INJECTION, SOLUTION SUBCUTANEOUS
COMMUNITY
Start: 2023-02-14 | End: 2023-06-30

## 2023-03-27 NOTE — ASSESSMENT & PLAN NOTE
Lost 31 lbs since bariatric surgery. Applaud on progress and encouraged to keep up the great work. Counseled on importance of diet and exercise. Encouraged patient to have an active lifestyle with regular exercise with healthy eating.

## 2023-03-27 NOTE — ASSESSMENT & PLAN NOTE
BP initially elevated, normalized with repeat reading. Continue losartan 50 mg. Advised to monitor BP at home x 1 week and send readings through [employer]. If home BP readings low, will decrease losartan to 25 mg daily.  RTC in 6 months.

## 2023-03-27 NOTE — PROGRESS NOTES
Subjective:       Patient ID: Marice Geiger is a 52 y.o. female.    Chief Complaint: Follow-up (6 month)    Mrs. Geiger returns to clinic for DM/HTN follow up. Since last visit, she completed bariatric surgery in November 2022 and has lost 31 lbs. Reports compliance with medications. She has noticed some dizziness after morning medications and would like to discuss lowering some of her medications since weight loss. Eye exam completed at [facility] GIOVANA completed. She is otherwise without compliant today.    Diabetes Management Status    Statin: Taking  ACE/ARB: Taking    Screening or Prevention Patient's value Goal Complete/Controlled?   HgA1C Testing and Control   Lab Results   Component Value Date    HGBA1C 5.8 (H) 09/21/2022      Annually/Less than 8% Yes     Lipid profile : 09/21/2022 Annually Yes     LDL control Lab Results   Component Value Date    LDLCALC 77.4 09/21/2022    Annually/Less than 100 mg/dl  Yes     Nephropathy screening Lab Results   Component Value Date    LABMICR 37.0 09/26/2022     Lab Results   Component Value Date    PROTEINUA Negative 09/04/2018     No results found for: UTPCR   Annually Yes     Blood pressure BP Readings from Last 1 Encounters:   03/27/23 136/84    Less than 140/90 Yes     Dilated retinal exam : 09/27/2021 Annually No     Foot exam   : 03/27/2023 Annually Yes         Patient Active Problem List   Diagnosis    Type 2 diabetes mellitus without complication, without long-term current use of insulin    Severe obesity (BMI 35.0-35.9 with comorbidity)    Gastroesophageal reflux disease without esophagitis    Atopic dermatitis    Acute atopic conjunctivitis of left eye    Essential hypertension       Family History   Problem Relation Age of Onset    Diabetes Mother     Heart disease Mother     Heart attack Father     Heart disease Father     Diabetes Brother     Hypertension Brother     Breast cancer Paternal Aunt     Colon cancer Neg Hx     Ovarian cancer Neg  Hx      History reviewed. No pertinent surgical history.      Current Outpatient Medications:     atorvastatin (LIPITOR) 20 MG tablet, TAKE 1 TABLET(20 MG) BY MOUTH EVERY DAY, Disp: 90 tablet, Rfl: 3    blood-glucose meter (TRUE METRIX GLUCOSE METER) Misc, USE TWO TIMES A DAY, Disp: 1 each, Rfl: 0    cetirizine (ZYRTEC) 10 MG tablet, TAKE 1 TABLET BY MOUTH ONCE DAILY, Disp: 30 tablet, Rfl: 2    lancets (ONETOUCH DELICA PLUS LANCET) 30 gauge Misc, USE AS DIRECTED, Disp: 100 each, Rfl: 11    losartan (COZAAR) 50 MG tablet, TAKE 1 TABLET(50 MG) BY MOUTH EVERY DAY, Disp: 90 tablet, Rfl: 3    metFORMIN (GLUCOPHAGE) 500 MG tablet, TAKE 1 TABLET(500 MG) BY MOUTH TWICE DAILY WITH MEALS, Disp: 180 tablet, Rfl: 3    olopatadine (PATANOL) 0.1 % ophthalmic solution, Place 1 drop into the left eye 2 (two) times daily., Disp: 5 mL, Rfl: 0    omeprazole (PRILOSEC) 20 MG capsule, Take 1 capsule (20 mg total) by mouth once daily., Disp: 30 capsule, Rfl: 11    TRUE METRIX GLUCOSE TEST STRIP Strp, USE TO TEST TWICE DAILY., Disp: 100 strip, Rfl: 11    TRUEPLUS LANCETS 33 gauge Misc, Apply topically 2 (two) times daily., Disp: , Rfl:     TRULICITY 0.75 mg/0.5 mL pen injector, Inject 0.75 mg into the skin every 7 days., Disp: , Rfl:     blood pressure monitor (BLOOD PRESSURE KIT) Kit, 1 kit by Misc.(Non-Drug; Combo Route) route once daily., Disp: 1 each, Rfl: 0    Review of Systems   Constitutional:  Negative for appetite change.   Respiratory:  Negative for chest tightness and shortness of breath.    Cardiovascular:  Negative for chest pain, palpitations and leg swelling.   Gastrointestinal:  Negative for diarrhea.   Endocrine: Negative for polydipsia, polyphagia and polyuria.   Musculoskeletal:  Negative for gait problem.   Neurological:  Positive for dizziness. Negative for syncope and light-headedness.   Psychiatric/Behavioral:  Negative for dysphoric mood. The patient is not nervous/anxious.      Objective:   /84 (BP Location:  "Left arm, Patient Position: Sitting, BP Method: Medium (Manual))   Pulse 86   Temp 98.1 °F (36.7 °C)   Ht 5' 8" (1.727 m)   Wt 105.3 kg (232 lb 2.3 oz)   SpO2 97%   BMI 35.30 kg/m²      Physical Exam  Constitutional:       General: She is not in acute distress.     Appearance: Normal appearance. She is obese. She is not ill-appearing.   HENT:      Head: Normocephalic.   Eyes:      Conjunctiva/sclera: Conjunctivae normal.   Cardiovascular:      Rate and Rhythm: Normal rate and regular rhythm.      Heart sounds: Normal heart sounds. No murmur heard.    No friction rub. No gallop.   Pulmonary:      Effort: Pulmonary effort is normal. No respiratory distress.      Breath sounds: Normal breath sounds. No wheezing, rhonchi or rales.   Musculoskeletal:         General: Normal range of motion.      Cervical back: Normal range of motion.      Right lower leg: No edema.      Left lower leg: No edema.   Feet:      Comments: FOOT EVALUATION: 10 gram monofilament exam with protective sensation intact bilaterally. Nails appropriately trimmed. No ulcers. Distal pulses palpable.    Skin:     General: Skin is warm and dry.      Coloration: Skin is not pale.      Findings: No erythema.   Neurological:      Mental Status: She is alert and oriented to person, place, and time.      Gait: Gait normal.   Psychiatric:         Mood and Affect: Mood normal.         Behavior: Behavior normal.       Assessment & Plan     1. Essential hypertension  Assessment & Plan:  BP initially elevated, normalized with repeat reading. Continue losartan 50 mg. Advised to monitor BP at home x 1 week and [facility]. If home BP readings low, will decrease losartan to 25 mg daily.  RTC in 6 months.     Orders:  -     COMPREHENSIVE METABOLIC PANEL; Future; Expected date: 03/27/2023  -     blood pressure monitor (BLOOD PRESSURE KIT) Kit; 1 kit by Misc.(Non-Drug; Combo Route) route once daily.  Dispense: 1 each; Refill: 0    2. Type 2 " "diabetes mellitus without complication, without long-term current use of insulin  Assessment & Plan:  Lab Results   Component Value Date    HGBA1C 5.8 (H) 09/21/2022   Repeat A1c today. Will decrease metformin to 500 mg daily if a1c remains stable. Continue Trulicity. GIOVANA for recent eye exam. Foot exam today.      Orders:  -     Hemoglobin A1C; Future; Expected date: 03/27/2023  -     COMPREHENSIVE METABOLIC PANEL; Future; Expected date: 03/27/2023  -     Foot Exam Performed    3. Gastroesophageal reflux disease without esophagitis  Assessment & Plan:  Stable. On emeprazole      4. Severe obesity (BMI 35.0-35.9 with comorbidity)  Assessment & Plan:  Lost 31 lbs since bariatric surgery. Applaud on progress and encouraged to keep up the great work. Counseled on importance of diet and exercise. Encouraged patient to have an active lifestyle with regular exercise with healthy eating.       5. Pneumococcal vaccine administered  -     (In Office Administered) Pneumococcal Conjugate Vaccine (20 Valent) (IM)             MANUEL Albright      Portions of this note may have been created with voice recognition software. Occasional "wrong-word" or "sound-a-like" substitutions may have occurred due to the inherent limitations of voice recognition software. Please, read the note carefully and recognize, using context, where substitutions have occurred.     "

## 2023-03-27 NOTE — PROGRESS NOTES
GIOVANA uploaded and faxed to [facility]-Dr. Maravilla for DM Eye Exam

## 2023-03-27 NOTE — ASSESSMENT & PLAN NOTE
Lab Results   Component Value Date    HGBA1C 5.8 (H) 09/21/2022   Repeat A1c today. Will decrease metformin to 500 mg daily if a1c remains stable. Continue Trulicity. GIOVANA for recent eye exam. Foot exam today.     Statement Selected

## 2023-03-28 LAB
ESTIMATED AVG GLUCOSE: 108 MG/DL (ref 68–131)
HBA1C MFR BLD: 5.4 % (ref 4–5.6)

## 2023-04-06 NOTE — NURSING
Pt left without paper work. Was not able to take pt IVs out.    [FreeTextEntry1] : Mr. Ike Daniel is a 62 year old male with a chief complaint of lower back pain which refers into the right lower extremity. He notes pain is mainly in the front of the thigh and knee. He has had this pain for about 6 weeks following no precipitating injury or event. He had a lumbar MRI which was reviewed in detail during today's encounter. He continues with physical therapy and it is providing him with relief at this time. Patient would like to continue physical therapy at this time. If pain becomes worse we would trial a right L4-5 SNRI w/ MAC.\par \par Patient will continue to manage his pain medically with  meloxicam. He notes the medication provides him with about 30-50% relief. \par \par I, Nicole Ordonez, attest that this documentation has been prepared under the direction and in the presence of Provider Doe Siegel DO\par The documentation recorded by the scribe, in my presence, accurately reflects the service I personally performed, and the decisions made by me with my edits as appropriate.\par \par Best Regards, \par Doe Siegel D.O. \par Diplomat, American Board of Anesthesiology \par Diplomat, American Board of Pain Medicine \par Diplomat, American Board of Pain Management

## 2023-04-10 NOTE — PROGRESS NOTES
3rd Req-Called [facility] to f/u on DM EYE EXAM. Office closed on today. Will call back in two days

## 2023-04-12 NOTE — PROGRESS NOTES
No record exam received. Call patient to f/u. No answer, lvm. Called [facility] [place] again. Staff printed exam while on phone and will fax today. 1wk f/u set

## 2023-05-17 DIAGNOSIS — H10.12 ACUTE ATOPIC CONJUNCTIVITIS OF LEFT EYE: ICD-10-CM

## 2023-05-17 RX ORDER — OLOPATADINE HYDROCHLORIDE 1 MG/ML
SOLUTION/ DROPS OPHTHALMIC
Qty: 5 ML | Refills: 0 | Status: SHIPPED | OUTPATIENT
Start: 2023-05-17 | End: 2023-06-12

## 2023-06-12 DIAGNOSIS — H10.12 ACUTE ATOPIC CONJUNCTIVITIS OF LEFT EYE: ICD-10-CM

## 2023-06-12 RX ORDER — OLOPATADINE HYDROCHLORIDE 1 MG/ML
SOLUTION/ DROPS OPHTHALMIC
Qty: 5 ML | Refills: 5 | Status: SHIPPED | OUTPATIENT
Start: 2023-06-12 | End: 2023-06-16

## 2023-06-16 DIAGNOSIS — H10.12 ACUTE ATOPIC CONJUNCTIVITIS OF LEFT EYE: ICD-10-CM

## 2023-06-16 RX ORDER — OLOPATADINE HYDROCHLORIDE 1 MG/ML
SOLUTION/ DROPS OPHTHALMIC
Qty: 5 ML | Refills: 5 | Status: SHIPPED | OUTPATIENT
Start: 2023-06-16 | End: 2023-09-27 | Stop reason: SDUPTHER

## 2023-06-16 RX ORDER — ATORVASTATIN CALCIUM 20 MG/1
TABLET, FILM COATED ORAL
Qty: 90 TABLET | Refills: 3 | Status: SHIPPED | OUTPATIENT
Start: 2023-06-16

## 2023-06-30 RX ORDER — DULAGLUTIDE 0.75 MG/.5ML
0.75 INJECTION, SOLUTION SUBCUTANEOUS
Qty: 12 PEN | Refills: 1 | Status: SHIPPED | OUTPATIENT
Start: 2023-06-30 | End: 2023-07-25 | Stop reason: SDUPTHER

## 2023-07-07 ENCOUNTER — TELEPHONE (OUTPATIENT)
Dept: INTERNAL MEDICINE | Facility: CLINIC | Age: 53
End: 2023-07-07
Payer: MEDICAID

## 2023-07-07 DIAGNOSIS — I10 ESSENTIAL HYPERTENSION: Primary | ICD-10-CM

## 2023-07-07 RX ORDER — OLMESARTAN MEDOXOMIL 20 MG/1
20 TABLET ORAL DAILY
Qty: 90 TABLET | Refills: 3 | Status: SHIPPED | OUTPATIENT
Start: 2023-07-07 | End: 2024-07-06

## 2023-07-07 NOTE — TELEPHONE ENCOUNTER
Received notice from pharmacy. Insurance no longer covers losartan. Preferred alternatives are olmesartan or irbesartan. Start olmesartan 20 mg daily.

## 2023-07-09 DIAGNOSIS — K21.9 GASTROESOPHAGEAL REFLUX DISEASE WITHOUT ESOPHAGITIS: ICD-10-CM

## 2023-07-10 RX ORDER — OMEPRAZOLE 20 MG/1
20 CAPSULE, DELAYED RELEASE ORAL DAILY
Qty: 30 CAPSULE | Refills: 5 | Status: SHIPPED | OUTPATIENT
Start: 2023-07-10 | End: 2023-09-27 | Stop reason: SDUPTHER

## 2023-07-10 NOTE — TELEPHONE ENCOUNTER
Refill Routing Note   Medication(s) are not appropriate for processing by [facility] for the following reason(s):      Non-participating provider    [facility] action(s):  Route None identified            Appointments  past 12m or future 3m with PCP    Date Provider   Last Visit   3/21/2022 Adali iSbley NP   Next Visit   Visit date not found Adali Sibley NP   ED visits in past 90 days: 0        Note composed:12:23 PM 07/10/2023

## 2023-07-25 ENCOUNTER — TELEPHONE (OUTPATIENT)
Dept: INTERNAL MEDICINE | Facility: CLINIC | Age: 53
End: 2023-07-25
Payer: MEDICAID

## 2023-07-25 DIAGNOSIS — E11.9 TYPE 2 DIABETES MELLITUS WITHOUT COMPLICATION, WITHOUT LONG-TERM CURRENT USE OF INSULIN: Primary | ICD-10-CM

## 2023-07-25 RX ORDER — DULAGLUTIDE 0.75 MG/.5ML
0.75 INJECTION, SOLUTION SUBCUTANEOUS
Qty: 12 PEN | Refills: 1 | Status: SHIPPED | OUTPATIENT
Start: 2023-07-25 | End: 2023-09-27

## 2023-09-27 ENCOUNTER — HOSPITAL ENCOUNTER (OUTPATIENT)
Dept: RADIOLOGY | Facility: HOSPITAL | Age: 53
Discharge: HOME | End: 2023-09-27
Attending: NURSE PRACTITIONER
Payer: MEDICAID

## 2023-09-27 ENCOUNTER — OFFICE VISIT (OUTPATIENT)
Dept: INTERNAL MEDICINE | Facility: CLINIC | Age: 53
End: 2023-09-27
Payer: MEDICAID

## 2023-09-27 VITALS
OXYGEN SATURATION: 99 % | HEIGHT: 68 IN | RESPIRATION RATE: 18 BRPM | BODY MASS INDEX: 34.28 KG/M2 | HEART RATE: 77 BPM | DIASTOLIC BLOOD PRESSURE: 82 MMHG | SYSTOLIC BLOOD PRESSURE: 124 MMHG | TEMPERATURE: 98 F | WEIGHT: 226.19 LBS

## 2023-09-27 DIAGNOSIS — Z00.00 ROUTINE ADULT HEALTH MAINTENANCE: Primary | ICD-10-CM

## 2023-09-27 DIAGNOSIS — I10 ESSENTIAL HYPERTENSION: ICD-10-CM

## 2023-09-27 DIAGNOSIS — E11.9 TYPE 2 DIABETES MELLITUS WITHOUT COMPLICATION, WITHOUT LONG-TERM CURRENT USE OF INSULIN: ICD-10-CM

## 2023-09-27 DIAGNOSIS — H10.13 ATOPIC CONJUNCTIVITIS OF BOTH EYES: ICD-10-CM

## 2023-09-27 DIAGNOSIS — E66.09 CLASS 1 OBESITY DUE TO EXCESS CALORIES WITH SERIOUS COMORBIDITY AND BODY MASS INDEX (BMI) OF 34.0 TO 34.9 IN ADULT: ICD-10-CM

## 2023-09-27 DIAGNOSIS — Z00.00 ROUTINE ADULT HEALTH MAINTENANCE: ICD-10-CM

## 2023-09-27 DIAGNOSIS — Z23 INFLUENZA VACCINE ADMINISTERED: ICD-10-CM

## 2023-09-27 DIAGNOSIS — K21.9 GASTROESOPHAGEAL REFLUX DISEASE WITHOUT ESOPHAGITIS: ICD-10-CM

## 2023-09-27 PROBLEM — E66.01 SEVERE OBESITY (BMI 35.0-35.9 WITH COMORBIDITY): Status: RESOLVED | Noted: 2019-07-02 | Resolved: 2023-09-27

## 2023-09-27 PROBLEM — E66.811 CLASS 1 OBESITY DUE TO EXCESS CALORIES WITH SERIOUS COMORBIDITY AND BODY MASS INDEX (BMI) OF 34.0 TO 34.9 IN ADULT: Status: ACTIVE | Noted: 2023-09-27

## 2023-09-27 PROCEDURE — 77063 MAMMO DIGITAL SCREENING BILAT WITH TOMO: ICD-10-PCS | Mod: 26,,, | Performed by: RADIOLOGY

## 2023-09-27 PROCEDURE — 99999PBSHW FLU VACCINE (QUAD) GREATER THAN OR EQUAL TO 3YO PRESERVATIVE FREE IM: Mod: PBBFAC,,,

## 2023-09-27 PROCEDURE — 1159F MED LIST DOCD IN RCRD: CPT | Mod: CPTII,,, | Performed by: NURSE PRACTITIONER

## 2023-09-27 PROCEDURE — 3008F BODY MASS INDEX DOCD: CPT | Mod: CPTII,,, | Performed by: NURSE PRACTITIONER

## 2023-09-27 PROCEDURE — 77063 BREAST TOMOSYNTHESIS BI: CPT | Mod: 26,,, | Performed by: RADIOLOGY

## 2023-09-27 PROCEDURE — 3079F DIAST BP 80-89 MM HG: CPT | Mod: CPTII,,, | Performed by: NURSE PRACTITIONER

## 2023-09-27 PROCEDURE — 3074F PR MOST RECENT SYSTOLIC BLOOD PRESSURE < 130 MM HG: ICD-10-PCS | Mod: CPTII,,, | Performed by: NURSE PRACTITIONER

## 2023-09-27 PROCEDURE — 3008F PR BODY MASS INDEX (BMI) DOCUMENTED: ICD-10-PCS | Mod: CPTII,,, | Performed by: NURSE PRACTITIONER

## 2023-09-27 PROCEDURE — 3044F HG A1C LEVEL LT 7.0%: CPT | Mod: CPTII,,, | Performed by: NURSE PRACTITIONER

## 2023-09-27 PROCEDURE — 4010F PR ACE/ARB THEARPY RXD/TAKEN: ICD-10-PCS | Mod: CPTII,,, | Performed by: NURSE PRACTITIONER

## 2023-09-27 PROCEDURE — 99999 PR PBB SHADOW E&M-EST. PATIENT-LVL IV: CPT | Mod: PBBFAC,,, | Performed by: NURSE PRACTITIONER

## 2023-09-27 PROCEDURE — 3079F PR MOST RECENT DIASTOLIC BLOOD PRESSURE 80-89 MM HG: ICD-10-PCS | Mod: CPTII,,, | Performed by: NURSE PRACTITIONER

## 2023-09-27 PROCEDURE — 99999PBSHW FLU VACCINE (QUAD) GREATER THAN OR EQUAL TO 3YO PRESERVATIVE FREE IM: ICD-10-PCS | Mod: PBBFAC,,,

## 2023-09-27 PROCEDURE — 1160F PR REVIEW ALL MEDS BY PRESCRIBER/CLIN PHARMACIST DOCUMENTED: ICD-10-PCS | Mod: CPTII,,, | Performed by: NURSE PRACTITIONER

## 2023-09-27 PROCEDURE — 99396 PREV VISIT EST AGE 40-64: CPT | Mod: S$PBB,,, | Performed by: NURSE PRACTITIONER

## 2023-09-27 PROCEDURE — 4010F ACE/ARB THERAPY RXD/TAKEN: CPT | Mod: CPTII,,, | Performed by: NURSE PRACTITIONER

## 2023-09-27 PROCEDURE — 3044F PR MOST RECENT HEMOGLOBIN A1C LEVEL <7.0%: ICD-10-PCS | Mod: CPTII,,, | Performed by: NURSE PRACTITIONER

## 2023-09-27 PROCEDURE — 77067 SCR MAMMO BI INCL CAD: CPT | Mod: TC,PO

## 2023-09-27 PROCEDURE — 99214 OFFICE O/P EST MOD 30 MIN: CPT | Mod: PBBFAC,PO | Performed by: NURSE PRACTITIONER

## 2023-09-27 PROCEDURE — 99396 PR PREVENTIVE VISIT,EST,40-64: ICD-10-PCS | Mod: S$PBB,,, | Performed by: NURSE PRACTITIONER

## 2023-09-27 PROCEDURE — 77067 SCR MAMMO BI INCL CAD: CPT | Mod: 26,,, | Performed by: RADIOLOGY

## 2023-09-27 PROCEDURE — 1159F PR MEDICATION LIST DOCUMENTED IN MEDICAL RECORD: ICD-10-PCS | Mod: CPTII,,, | Performed by: NURSE PRACTITIONER

## 2023-09-27 PROCEDURE — 77067 MAMMO DIGITAL SCREENING BILAT WITH TOMO: ICD-10-PCS | Mod: 26,,, | Performed by: RADIOLOGY

## 2023-09-27 PROCEDURE — 3074F SYST BP LT 130 MM HG: CPT | Mod: CPTII,,, | Performed by: NURSE PRACTITIONER

## 2023-09-27 PROCEDURE — 99999 PR PBB SHADOW E&M-EST. PATIENT-LVL IV: ICD-10-PCS | Mod: PBBFAC,,, | Performed by: NURSE PRACTITIONER

## 2023-09-27 PROCEDURE — 1160F RVW MEDS BY RX/DR IN RCRD: CPT | Mod: CPTII,,, | Performed by: NURSE PRACTITIONER

## 2023-09-27 PROCEDURE — 90471 IMMUNIZATION ADMIN: CPT | Mod: PBBFAC,PO

## 2023-09-27 RX ORDER — OLOPATADINE HYDROCHLORIDE 1 MG/ML
1 SOLUTION/ DROPS OPHTHALMIC 2 TIMES DAILY
Qty: 5 ML | Refills: 5 | Status: SHIPPED | OUTPATIENT
Start: 2023-09-27 | End: 2024-04-01

## 2023-09-27 RX ORDER — OMEPRAZOLE 20 MG/1
20 CAPSULE, DELAYED RELEASE ORAL DAILY
Qty: 30 CAPSULE | Refills: 5 | Status: SHIPPED | OUTPATIENT
Start: 2023-09-27

## 2023-09-27 RX ORDER — SEMAGLUTIDE 0.68 MG/ML
0.25 INJECTION, SOLUTION SUBCUTANEOUS
Qty: 3 ML | Refills: 1 | Status: SHIPPED | OUTPATIENT
Start: 2023-09-27 | End: 2023-11-28

## 2023-09-27 NOTE — ASSESSMENT & PLAN NOTE
Counseled on importance of diet and exercise. Encouraged patient to have an active lifestyle with regular exercise with healthy eating.

## 2023-09-27 NOTE — ASSESSMENT & PLAN NOTE
Lab Results   Component Value Date    HGBA1C 5.4 03/27/2023   Well controlled. Switch from Trulicity to Ozempic. Start  Ozempic 0.25 mg weekly x 4 weeks. Eye exam scheduled next week.

## 2023-09-27 NOTE — PROGRESS NOTES
"Subjective:       Patient ID: Marcie Geiger is a 52 y.o. female.    Chief Complaint: Annual Exam    Mrs. Geiger returns to clinic for annual exam. Reports compliance with medications. Requesting to switch from Trulicity to Ozempic due to side effects. She is due for routine labs, mammogram, influenza vaccine, and eye exam. Eye exam is scheduled with [facility] next week. She is otherwise without complaint today    Diabetes Management Status    Statin: Taking  ACE/ARB: Taking    Screening or Prevention Patient's value Goal Complete/Controlled?   HgA1C Testing and Control   Lab Results   Component Value Date    HGBA1C 5.4 03/27/2023      Annually/Less than 8% Yes   Lipid profile : 09/21/2022 Annually No   LDL control Lab Results   Component Value Date    LDLCALC 77.4 09/21/2022    Annually/Less than 100 mg/dl  No   Nephropathy screening Lab Results   Component Value Date    LABMICR 37.0 09/26/2022     Lab Results   Component Value Date    PROTEINUA Negative 09/04/2018     No results found for: "UTPCR"   Annually No   Blood pressure BP Readings from Last 1 Encounters:   09/27/23 124/82    Less than 140/90 Yes   Dilated retinal exam : 09/06/2022 Annually No   Foot exam   : 03/27/2023 Annually Yes         Patient Active Problem List   Diagnosis    Type 2 diabetes mellitus without complication, without long-term current use of insulin    Gastroesophageal reflux disease without esophagitis    Atopic dermatitis    Atopic conjunctivitis of both eyes    Essential hypertension    Class 1 obesity due to excess calories with serious comorbidity and body mass index (BMI) of 34.0 to 34.9 in adult       Family History   Problem Relation Age of Onset    Diabetes Mother     Heart disease Mother     Heart attack Father     Heart disease Father     Diabetes Brother     Hypertension Brother     Breast cancer Paternal Aunt     Colon cancer Neg Hx     Ovarian cancer Neg Hx      Past Surgical History:   Procedure Laterality " Date    bariatic surgery           Current Outpatient Medications:     atorvastatin (LIPITOR) 20 MG tablet, TAKE 1 TABLET(20 MG) BY MOUTH EVERY DAY, Disp: 90 tablet, Rfl: 3    blood pressure monitor (BLOOD PRESSURE KIT) Kit, 1 kit by Misc.(Non-Drug; Combo Route) route once daily., Disp: 1 each, Rfl: 0    blood-glucose meter (TRUE METRIX GLUCOSE METER) Misc, USE TWO TIMES A DAY, Disp: 1 each, Rfl: 0    cetirizine (ZYRTEC) 10 MG tablet, TAKE 1 TABLET BY MOUTH ONCE DAILY, Disp: 30 tablet, Rfl: 2    lancets (ONETOUCH DELICA PLUS LANCET) 30 gauge Misc, USE AS DIRECTED, Disp: 100 each, Rfl: 11    olmesartan (BENICAR) 20 MG tablet, Take 1 tablet (20 mg total) by mouth once daily., Disp: 90 tablet, Rfl: 3    TRUE METRIX GLUCOSE TEST STRIP Strp, USE TO TEST TWICE DAILY., Disp: 100 strip, Rfl: 11    TRUEPLUS LANCETS 33 gauge Misc, Apply topically 2 (two) times daily., Disp: , Rfl:     olopatadine (PATANOL) 0.1 % ophthalmic solution, Place 1 drop into the left eye 2 (two) times daily., Disp: 5 mL, Rfl: 5    omeprazole (PRILOSEC) 20 MG capsule, Take 1 capsule (20 mg total) by mouth once daily., Disp: 30 capsule, Rfl: 5    semaglutide (OZEMPIC) 0.25 mg or 0.5 mg (2 mg/3 mL) pen injector, Inject 0.25 mg into the skin every 7 days., Disp: 3 mL, Rfl: 1    Review of Systems   Constitutional:  Negative for appetite change, chills, fatigue, fever and unexpected weight change.   Eyes:  Negative for visual disturbance.   Respiratory:  Negative for chest tightness and shortness of breath.    Cardiovascular:  Negative for palpitations and leg swelling.   Gastrointestinal:  Negative for abdominal pain, constipation, diarrhea, nausea and vomiting.   Endocrine: Negative for polydipsia, polyphagia and polyuria.   Genitourinary:  Negative for difficulty urinating.   Musculoskeletal:  Negative for gait problem and myalgias.   Skin:  Negative for rash.   Neurological:  Negative for dizziness, syncope, weakness, light-headedness and headaches.  "  Psychiatric/Behavioral:  Negative for dysphoric mood. The patient is not nervous/anxious.        Objective:   /82 (BP Location: Left arm, Patient Position: Sitting, BP Method: Large (Manual))   Pulse 77   Temp 97.7 °F (36.5 °C) (Temporal)   Resp 18   Ht 5' 8" (1.727 m)   Wt 102.6 kg (226 lb 3.1 oz)   SpO2 99%   BMI 34.39 kg/m²      Physical Exam  Constitutional:       General: She is not in acute distress.     Appearance: Normal appearance. She is obese. She is not ill-appearing.   HENT:      Head: Normocephalic.   Eyes:      Conjunctiva/sclera: Conjunctivae normal.   Cardiovascular:      Rate and Rhythm: Normal rate and regular rhythm.      Pulses: Normal pulses.      Heart sounds: Normal heart sounds. No murmur heard.     No friction rub. No gallop.   Pulmonary:      Effort: Pulmonary effort is normal. No respiratory distress.      Breath sounds: Normal breath sounds. No wheezing.   Abdominal:      General: Bowel sounds are normal. There is no distension.      Palpations: Abdomen is soft. There is no mass.      Tenderness: There is no abdominal tenderness. There is no guarding or rebound.   Musculoskeletal:         General: Normal range of motion.      Cervical back: Normal range of motion.      Right lower leg: No edema.      Left lower leg: No edema.   Skin:     General: Skin is warm and dry.      Coloration: Skin is not pale.      Findings: No erythema or rash.   Neurological:      Mental Status: She is alert and oriented to person, place, and time.      Coordination: Coordination normal.      Gait: Gait normal.   Psychiatric:         Mood and Affect: Mood normal.         Behavior: Behavior normal.         Assessment & Plan     1. Routine adult health maintenance  Comments:  No concerning findings on exam. Labs, mammogram, and flu vaccine today.   Orders:  -     Mammo Digital Screening Bilat w/ Richy; Future; Expected date: 09/27/2023  -     CBC Auto Differential; Future; Expected date: " 09/27/2023  -     Comprehensive Metabolic Panel; Future; Expected date: 09/27/2023  -     TSH; Future; Expected date: 09/27/2023  -     Hemoglobin A1C; Future; Expected date: 09/27/2023  -     Lipid Panel; Future; Expected date: 09/27/2023    2. Type 2 diabetes mellitus without complication, without long-term current use of insulin  Assessment & Plan:  Lab Results   Component Value Date    HGBA1C 5.4 03/27/2023   Well controlled. Switch from Trulicity to Ozempic. Start  Ozempic 0.25 mg weekly x 4 weeks. Eye exam scheduled next week.       Orders:  -     semaglutide (OZEMPIC) 0.25 mg or 0.5 mg (2 mg/3 mL) pen injector; Inject 0.25 mg into the skin every 7 days.  Dispense: 3 mL; Refill: 1  -     Hemoglobin A1C; Future; Expected date: 09/27/2023  -     Lipid Panel; Future; Expected date: 09/27/2023  -     Microalbumin/Creatinine Ratio, Urine; Future; Expected date: 09/27/2023    3. Essential hypertension  Assessment & Plan:  Stable. Continue olmesartan daily    Orders:  -     Comprehensive Metabolic Panel; Future; Expected date: 09/27/2023  -     Lipid Panel; Future; Expected date: 09/27/2023    4. Gastroesophageal reflux disease without esophagitis  Assessment & Plan:  Stable, continue omeprazole    Orders:  -     omeprazole (PRILOSEC) 20 MG capsule; Take 1 capsule (20 mg total) by mouth once daily.  Dispense: 30 capsule; Refill: 5    5. Atopic conjunctivitis of both eyes  Assessment & Plan:  Stable with intermittent use of olopatadine    Orders:  -     olopatadine (PATANOL) 0.1 % ophthalmic solution; Place 1 drop into the left eye 2 (two) times daily.  Dispense: 5 mL; Refill: 5    6. Class 1 obesity due to excess calories with serious comorbidity and body mass index (BMI) of 34.0 to 34.9 in adult  Assessment & Plan:  Counseled on importance of diet and exercise. Encouraged patient to have an active lifestyle with regular exercise with healthy eating.        7. Influenza vaccine administered  -     Influenza -  "Quadrivalent *Preferred* (6 months+) (PF)        Ramila Banegas, FNP-C      Portions of this note may have been created with voice recognition software. Occasional "wrong-word" or "sound-a-like" substitutions may have occurred due to the inherent limitations of voice recognition software. Please, read the note carefully and recognize, using context, where substitutions have occurred.     "

## 2023-09-28 ENCOUNTER — TELEPHONE (OUTPATIENT)
Dept: INTERNAL MEDICINE | Facility: CLINIC | Age: 53
End: 2023-09-28
Payer: MEDICAID

## 2023-09-28 DIAGNOSIS — K05.10 GINGIVITIS: Primary | ICD-10-CM

## 2023-09-28 RX ORDER — CHLORHEXIDINE GLUCONATE ORAL RINSE 1.2 MG/ML
15 SOLUTION DENTAL 2 TIMES DAILY
Qty: 420 ML | Refills: 0 | Status: SHIPPED | OUTPATIENT
Start: 2023-09-28 | End: 2023-10-12

## 2023-09-28 NOTE — TELEPHONE ENCOUNTER
----- Message from Danya Andrea MA sent at 9/28/2023 10:26 AM CDT -----  Contact: pt  Appt being made, I didn't see oral rinse  ----- Message -----  From: Bonnie Loyd  Sent: 9/28/2023   8:08 AM CDT  To: Ramila Banegas Staff    Pt is calling in regard to needing her f/u appt (which she needs mornings appt) and also, she did not get the oral rinse that she was supposed to call in at the pharm.  Please advise at call [phone]

## 2023-10-30 ENCOUNTER — TELEPHONE (OUTPATIENT)
Dept: INTERNAL MEDICINE | Facility: CLINIC | Age: 53
End: 2023-10-30
Payer: MEDICAID

## 2023-10-30 DIAGNOSIS — K05.10 GINGIVITIS: Primary | ICD-10-CM

## 2023-10-30 RX ORDER — CHLORHEXIDINE GLUCONATE ORAL RINSE 1.2 MG/ML
15 SOLUTION DENTAL 2 TIMES DAILY
Qty: 473 ML | Refills: 0 | Status: SHIPPED | OUTPATIENT
Start: 2023-10-30 | End: 2023-11-13

## 2023-10-30 NOTE — TELEPHONE ENCOUNTER
----- Message from Danya Andrea MA sent at 10/30/2023 10:37 AM CDT -----  Contact: Eddieace  I did not see it on the medication list  ----- Message -----  From: Kenya Carpenter  Sent: 10/30/2023  10:27 AM CDT  To: Ramila Banegas Staff    Patient is calling to speak with  a nurse regarding a refill . States being prescribed some Oral Rinse and needs an refill . Please send to     [id number] - [place], [place] - [address] [address] [address]  [place] [address]  Phone: [phone] Fax: [phone]    Please give patient a call back at [phone] (home)

## 2023-11-20 ENCOUNTER — TELEPHONE (OUTPATIENT)
Dept: INTERNAL MEDICINE | Facility: CLINIC | Age: 53
End: 2023-11-20
Payer: MEDICAID

## 2023-11-20 DIAGNOSIS — K05.10 GINGIVITIS: Primary | ICD-10-CM

## 2023-11-20 RX ORDER — CHLORHEXIDINE GLUCONATE ORAL RINSE 1.2 MG/ML
15 SOLUTION DENTAL 2 TIMES DAILY
Qty: 473 ML | Refills: 2 | Status: SHIPPED | OUTPATIENT
Start: 2023-11-20 | End: 2024-02-05

## 2023-11-28 DIAGNOSIS — E11.9 TYPE 2 DIABETES MELLITUS WITHOUT COMPLICATION, WITHOUT LONG-TERM CURRENT USE OF INSULIN: ICD-10-CM

## 2023-11-28 RX ORDER — SEMAGLUTIDE 0.68 MG/ML
0.25 INJECTION, SOLUTION SUBCUTANEOUS
Qty: 3 ML | Refills: 1 | Status: SHIPPED | OUTPATIENT
Start: 2023-11-28 | End: 2024-01-22 | Stop reason: SDUPTHER

## 2024-01-22 DIAGNOSIS — E11.9 TYPE 2 DIABETES MELLITUS WITHOUT COMPLICATION, WITHOUT LONG-TERM CURRENT USE OF INSULIN: ICD-10-CM

## 2024-01-22 RX ORDER — SEMAGLUTIDE 0.68 MG/ML
0.25 INJECTION, SOLUTION SUBCUTANEOUS
Qty: 3 ML | Refills: 1 | Status: SHIPPED | OUTPATIENT
Start: 2024-01-22 | End: 2024-03-22

## 2024-02-04 DIAGNOSIS — K05.10 GINGIVITIS: ICD-10-CM

## 2024-02-05 RX ORDER — CHLORHEXIDINE GLUCONATE ORAL RINSE 1.2 MG/ML
SOLUTION DENTAL
Qty: 473 ML | Refills: 2 | Status: SHIPPED | OUTPATIENT
Start: 2024-02-05 | End: 2024-05-24

## 2024-02-05 RX ORDER — CHLORHEXIDINE GLUCONATE ORAL RINSE 1.2 MG/ML
15 SOLUTION DENTAL 2 TIMES DAILY
COMMUNITY
Start: 2024-01-22

## 2024-04-01 ENCOUNTER — OFFICE VISIT (OUTPATIENT)
Dept: INTERNAL MEDICINE | Facility: CLINIC | Age: 54
End: 2024-04-01
Payer: MEDICAID

## 2024-04-01 ENCOUNTER — LAB VISIT (OUTPATIENT)
Dept: LAB | Facility: HOSPITAL | Age: 54
End: 2024-04-01
Attending: NURSE PRACTITIONER
Payer: MEDICAID

## 2024-04-01 VITALS
HEART RATE: 86 BPM | WEIGHT: 230.19 LBS | DIASTOLIC BLOOD PRESSURE: 70 MMHG | OXYGEN SATURATION: 94 % | TEMPERATURE: 99 F | BODY MASS INDEX: 34.89 KG/M2 | HEIGHT: 68 IN | SYSTOLIC BLOOD PRESSURE: 126 MMHG | RESPIRATION RATE: 18 BRPM

## 2024-04-01 DIAGNOSIS — E11.9 TYPE 2 DIABETES MELLITUS WITHOUT COMPLICATION, WITHOUT LONG-TERM CURRENT USE OF INSULIN: Primary | ICD-10-CM

## 2024-04-01 DIAGNOSIS — M25.551 RIGHT HIP PAIN: ICD-10-CM

## 2024-04-01 DIAGNOSIS — E11.9 TYPE 2 DIABETES MELLITUS WITHOUT COMPLICATION, WITHOUT LONG-TERM CURRENT USE OF INSULIN: ICD-10-CM

## 2024-04-01 DIAGNOSIS — E55.9 VITAMIN D DEFICIENCY: ICD-10-CM

## 2024-04-01 DIAGNOSIS — I10 ESSENTIAL HYPERTENSION: ICD-10-CM

## 2024-04-01 DIAGNOSIS — E66.09 CLASS 1 OBESITY DUE TO EXCESS CALORIES WITH SERIOUS COMORBIDITY AND BODY MASS INDEX (BMI) OF 34.0 TO 34.9 IN ADULT: ICD-10-CM

## 2024-04-01 PROBLEM — H10.13 ATOPIC CONJUNCTIVITIS OF BOTH EYES: Status: RESOLVED | Noted: 2020-12-07 | Resolved: 2024-04-01

## 2024-04-01 LAB
ALBUMIN SERPL BCP-MCNC: 3.7 G/DL (ref 3.5–5.2)
ALP SERPL-CCNC: 82 U/L (ref 55–135)
ALT SERPL W/O P-5'-P-CCNC: 18 U/L (ref 10–44)
ANION GAP SERPL CALC-SCNC: 10 MMOL/L (ref 8–16)
AST SERPL-CCNC: 24 U/L (ref 10–40)
BILIRUB SERPL-MCNC: 0.6 MG/DL (ref 0.1–1)
BUN SERPL-MCNC: 9 MG/DL (ref 6–20)
CALCIUM SERPL-MCNC: 9.5 MG/DL (ref 8.7–10.5)
CHLORIDE SERPL-SCNC: 107 MMOL/L (ref 95–110)
CO2 SERPL-SCNC: 27 MMOL/L (ref 23–29)
CREAT SERPL-MCNC: 0.8 MG/DL (ref 0.5–1.4)
EST. GFR  (NO RACE VARIABLE): >60 ML/MIN/1.73 M^2
ESTIMATED AVG GLUCOSE: 108 MG/DL (ref 68–131)
GLUCOSE SERPL-MCNC: 95 MG/DL (ref 70–110)
HBA1C MFR BLD: 5.4 % (ref 4–5.6)
POTASSIUM SERPL-SCNC: 4.2 MMOL/L (ref 3.5–5.1)
PROT SERPL-MCNC: 7.5 G/DL (ref 6–8.4)
SODIUM SERPL-SCNC: 144 MMOL/L (ref 136–145)

## 2024-04-01 PROCEDURE — 3078F DIAST BP <80 MM HG: CPT | Mod: CPTII,,, | Performed by: NURSE PRACTITIONER

## 2024-04-01 PROCEDURE — 1159F MED LIST DOCD IN RCRD: CPT | Mod: CPTII,,, | Performed by: NURSE PRACTITIONER

## 2024-04-01 PROCEDURE — 99214 OFFICE O/P EST MOD 30 MIN: CPT | Mod: PBBFAC,PO | Performed by: NURSE PRACTITIONER

## 2024-04-01 PROCEDURE — 3008F BODY MASS INDEX DOCD: CPT | Mod: CPTII,,, | Performed by: NURSE PRACTITIONER

## 2024-04-01 PROCEDURE — 3074F SYST BP LT 130 MM HG: CPT | Mod: CPTII,,, | Performed by: NURSE PRACTITIONER

## 2024-04-01 PROCEDURE — 1160F RVW MEDS BY RX/DR IN RCRD: CPT | Mod: CPTII,,, | Performed by: NURSE PRACTITIONER

## 2024-04-01 PROCEDURE — 83036 HEMOGLOBIN GLYCOSYLATED A1C: CPT | Performed by: NURSE PRACTITIONER

## 2024-04-01 PROCEDURE — 80053 COMPREHEN METABOLIC PANEL: CPT | Mod: PO | Performed by: NURSE PRACTITIONER

## 2024-04-01 PROCEDURE — 99999 PR PBB SHADOW E&M-EST. PATIENT-LVL IV: CPT | Mod: PBBFAC,,, | Performed by: NURSE PRACTITIONER

## 2024-04-01 PROCEDURE — 99214 OFFICE O/P EST MOD 30 MIN: CPT | Mod: S$PBB,,, | Performed by: NURSE PRACTITIONER

## 2024-04-01 PROCEDURE — 36415 COLL VENOUS BLD VENIPUNCTURE: CPT | Mod: PO | Performed by: NURSE PRACTITIONER

## 2024-04-01 PROCEDURE — 4010F ACE/ARB THERAPY RXD/TAKEN: CPT | Mod: CPTII,,, | Performed by: NURSE PRACTITIONER

## 2024-04-01 RX ORDER — SEMAGLUTIDE 1.34 MG/ML
1 INJECTION, SOLUTION SUBCUTANEOUS
Qty: 3 ML | Refills: 2 | Status: SHIPPED | OUTPATIENT
Start: 2024-04-01 | End: 2024-05-01 | Stop reason: DRUGHIGH

## 2024-04-01 RX ORDER — ERGOCALCIFEROL 1.25 MG/1
50000 CAPSULE ORAL
COMMUNITY
Start: 2024-02-09 | End: 2024-04-01 | Stop reason: SDUPTHER

## 2024-04-01 RX ORDER — SEMAGLUTIDE 0.68 MG/ML
0.5 INJECTION, SOLUTION SUBCUTANEOUS
COMMUNITY
End: 2024-04-01

## 2024-04-01 RX ORDER — ERGOCALCIFEROL 1.25 MG/1
50000 CAPSULE ORAL
Qty: 12 CAPSULE | Refills: 1 | Status: SHIPPED | OUTPATIENT
Start: 2024-04-01 | End: 2024-09-28

## 2024-04-01 NOTE — PROGRESS NOTES
Subjective:       Patient ID: Marcie Geiger is a 53 y.o. female.    Chief Complaint: Diabetes and Hypertension    Mrs. Geiger returns to clinic for DM/HTN follow up. Eye exam completed at [facility], [facility] completed. Requesting to increase Ozempic dosing for diabetes and weight loss benefit. Complains of right hip pain. Pain is worse in morning and improves with movement. Small abrasion to left 5th toe. Caused from hitting foot on wood board 1 week prior. She is otherwise without compliant today.         Patient Active Problem List   Diagnosis    Type 2 diabetes mellitus without complication, without long-term current use of insulin    Gastroesophageal reflux disease without esophagitis    Atopic dermatitis    Essential hypertension    Class 1 obesity due to excess calories with serious comorbidity and body mass index (BMI) of 34.0 to 34.9 in adult    Vitamin D deficiency       Family History   Problem Relation Age of Onset    Diabetes Mother     Heart disease Mother     Heart attack Father     Heart disease Father     Diabetes Brother     Hypertension Brother     Breast cancer Paternal Aunt     Colon cancer Neg Hx     Ovarian cancer Neg Hx      Past Surgical History:   Procedure Laterality Date    bariatic surgery           Current Outpatient Medications:     atorvastatin (LIPITOR) 20 MG tablet, TAKE 1 TABLET(20 MG) BY MOUTH EVERY DAY, Disp: 90 tablet, Rfl: 3    blood pressure monitor (BLOOD PRESSURE KIT) Kit, 1 kit by Misc.(Non-Drug; Combo Route) route once daily., Disp: 1 each, Rfl: 0    blood-glucose meter (TRUE METRIX GLUCOSE METER) Misc, USE TWO TIMES A DAY, Disp: 1 each, Rfl: 0    cetirizine (ZYRTEC) 10 MG tablet, TAKE 1 TABLET BY MOUTH ONCE DAILY, Disp: 30 tablet, Rfl: 2    chlorhexidine (PERIDEX) 0.12 % solution, RINSE AND GARGLE 15 ML BY MOUTH OR THROAT TWICE DAILY AS DIRECTED, Disp: 473 mL, Rfl: 2    lancets (ONETOUCH DELICA PLUS LANCET) 30 gauge Misc, USE AS DIRECTED, Disp: 100 each, Rfl:  "11    olmesartan (BENICAR) 20 MG tablet, Take 1 tablet (20 mg total) by mouth once daily., Disp: 90 tablet, Rfl: 3    omeprazole (PRILOSEC) 20 MG capsule, Take 1 capsule (20 mg total) by mouth once daily., Disp: 30 capsule, Rfl: 5    TRUE METRIX GLUCOSE TEST STRIP Strp, USE TO TEST TWICE DAILY., Disp: 100 strip, Rfl: 11    TRUEPLUS LANCETS 33 gauge Misc, Apply topically 2 (two) times daily., Disp: , Rfl:     chlorhexidine (PERIDEX) 0.12 % solution, 15 mLs 2 (two) times daily., Disp: , Rfl:     ergocalciferol (ERGOCALCIFEROL) 50,000 unit Cap, Take 1 capsule (50,000 Units total) by mouth every 7 days., Disp: 12 capsule, Rfl: 1    semaglutide (OZEMPIC) 1 mg/dose (4 mg/3 mL), Inject 1 mg into the skin every 7 days., Disp: 3 mL, Rfl: 2    Review of Systems   Constitutional:  Negative for appetite change, chills, fatigue, fever and unexpected weight change.   Eyes:  Negative for visual disturbance.   Respiratory:  Negative for chest tightness and shortness of breath.    Cardiovascular:  Negative for palpitations and leg swelling.   Gastrointestinal:  Negative for abdominal pain, constipation, diarrhea, nausea and vomiting.   Endocrine: Negative for polydipsia, polyphagia and polyuria.   Genitourinary:  Negative for difficulty urinating.   Musculoskeletal:  Positive for arthralgias. Negative for gait problem and myalgias.   Skin:  Negative for rash.   Neurological:  Negative for dizziness, syncope, weakness, light-headedness and headaches.   Psychiatric/Behavioral:  Negative for dysphoric mood. The patient is not nervous/anxious.        Objective:   /70 (BP Location: Left forearm, Patient Position: Sitting, BP Method: X-Large (Manual))   Pulse 86   Temp 98.5 °F (36.9 °C) (Oral)   Resp 18   Ht 5' 8" (1.727 m)   Wt 104.4 kg (230 lb 2.6 oz)   SpO2 (!) 94%   BMI 35.00 kg/m²      Physical Exam  Constitutional:       General: She is not in acute distress.     Appearance: Normal appearance. She is obese. She is not " ill-appearing.   HENT:      Head: Normocephalic.   Eyes:      Conjunctiva/sclera: Conjunctivae normal.   Cardiovascular:      Rate and Rhythm: Normal rate.   Pulmonary:      Effort: Pulmonary effort is normal. No respiratory distress.   Musculoskeletal:         General: Normal range of motion.      Cervical back: Normal range of motion.      Right lower leg: No edema.      Left lower leg: No edema.        Feet:    Feet:      Comments: FOOT EVALUATION: 10 gram monofilament exam with protective sensation intact bilaterally. Nails appropriately trimmed. No ulcers. Distal pulses palpable.    Skin:     General: Skin is warm and dry.      Coloration: Skin is not pale.      Findings: No erythema.   Neurological:      Mental Status: She is alert and oriented to person, place, and time.      Gait: Gait normal.   Psychiatric:         Mood and Affect: Mood normal.         Behavior: Behavior normal.         Assessment & Plan     1. Type 2 diabetes mellitus without complication, without long-term current use of insulin  Assessment & Plan:  Lab Results   Component Value Date    HGBA1C 5.2 09/27/2023     Well controlled.  Repeat A1c today.  Increase Ozempic dosing to 1 mg for weight loss benefit.  [facility] for recent eye exam.    Orders:  -     semaglutide (OZEMPIC) 1 mg/dose (4 mg/3 mL); Inject 1 mg into the skin every 7 days.  Dispense: 3 mL; Refill: 2  -     HEMOGLOBIN A1C; Future; Expected date: 04/01/2024  -     COMPREHENSIVE METABOLIC PANEL; Future; Expected date: 04/01/2024  -     Foot Exam Performed    2. Essential hypertension  Assessment & Plan:  Stable.  Blood pressure well controlled today in clinic.  Continue olmesartan.      3. Vitamin D deficiency  -     ergocalciferol (ERGOCALCIFEROL) 50,000 unit Cap; Take 1 capsule (50,000 Units total) by mouth every 7 days.  Dispense: 12 capsule; Refill: 1    4. Class 1 obesity due to excess calories with serious comorbidity and body mass index (BMI) of 34.0 to 34.9 in  "adult  Assessment & Plan:  Counseled on importance of diet and exercise. Encouraged patient to have an active lifestyle with regular exercise with healthy eating. Discussed the potential complications associated with obesity such as bone and joint pain, HTN, diabetes, and hyperlipidemia.  Increase Ozempic dosing for weight loss benefit.        5. Right hip pain  Comments:  Likely secondary to osteoarthritis.  Conservative treatment with NSAIDs PRN, heat, and stretches.  Declines imaging at this time.          MANUEL Albright      Portions of this note may have been created with voice recognition software. Occasional "wrong-word" or "sound-a-like" substitutions may have occurred due to the inherent limitations of voice recognition software. Please, read the note carefully and recognize, using context, where substitutions have occurred.     "

## 2024-04-01 NOTE — ASSESSMENT & PLAN NOTE
Lab Results   Component Value Date    HGBA1C 5.2 09/27/2023     Well controlled.  Repeat A1c today.  Increase Ozempic dosing to 1 mg for weight loss benefit.  [facility] for recent eye exam.

## 2024-04-01 NOTE — ASSESSMENT & PLAN NOTE
Counseled on importance of diet and exercise. Encouraged patient to have an active lifestyle with regular exercise with healthy eating. Discussed the potential complications associated with obesity such as bone and joint pain, HTN, diabetes, and hyperlipidemia.  Increase Ozempic dosing for weight loss benefit.

## 2024-04-02 ENCOUNTER — TELEPHONE (OUTPATIENT)
Dept: INTERNAL MEDICINE | Facility: CLINIC | Age: 54
End: 2024-04-02
Payer: MEDICAID

## 2024-04-02 DIAGNOSIS — H10.13 ALLERGIC CONJUNCTIVITIS OF BOTH EYES: Primary | ICD-10-CM

## 2024-04-02 RX ORDER — AZELASTINE HYDROCHLORIDE 0.5 MG/ML
1 SOLUTION/ DROPS OPHTHALMIC 2 TIMES DAILY
Qty: 6 ML | Refills: 0 | Status: SHIPPED | OUTPATIENT
Start: 2024-04-02 | End: 2024-05-02 | Stop reason: SDUPTHER

## 2024-04-02 NOTE — TELEPHONE ENCOUNTER
Spoke with patient and advised her that Ms. Mcgrath sent in a different eye drop this morning. If she is unable to get that one filled to call us back so we can get another eye drop to her.

## 2024-04-02 NOTE — TELEPHONE ENCOUNTER
----- Message from Rama Olivares LPN sent at 4/1/2024  4:39 PM CDT -----  Contact: pt [phone]    ----- Message -----  From: Yolanda Villar  Sent: 4/1/2024   3:50 PM CDT  To: Ramila Banegas Staff    Type: Needs Medical Advice  Who Called:  Pt     Pharmacy name and phone #:    [id number] - [place]  [address] [address] [address] [address] [address]  [place] [address]  Phone: [phone] Fax: [phone]      Best Call Back Number: [phone]    Additional Information:     Pt requesting a new rx for eyedrops. Pt current eye drops olopatadine (PATANOL) 0.1 % ophthalmic solution (Discontinued) On back order. Pls reach pt via [employer]. Thank you

## 2024-04-02 NOTE — TELEPHONE ENCOUNTER
----- Message from Manuel Rodriguez sent at 4/2/2024  8:23 AM CDT -----  Contact: yousuf Jack is calling regarding her eye drops being on back order.  Please give her a call back at [phone]

## 2024-05-01 ENCOUNTER — OFFICE VISIT (OUTPATIENT)
Dept: INTERNAL MEDICINE | Facility: CLINIC | Age: 54
End: 2024-05-01
Payer: MEDICAID

## 2024-05-01 VITALS
RESPIRATION RATE: 18 BRPM | HEART RATE: 81 BPM | TEMPERATURE: 98 F | BODY MASS INDEX: 34.28 KG/M2 | OXYGEN SATURATION: 97 % | DIASTOLIC BLOOD PRESSURE: 88 MMHG | HEIGHT: 68 IN | SYSTOLIC BLOOD PRESSURE: 136 MMHG | WEIGHT: 226.19 LBS

## 2024-05-01 DIAGNOSIS — E66.09 CLASS 1 OBESITY DUE TO EXCESS CALORIES WITH SERIOUS COMORBIDITY AND BODY MASS INDEX (BMI) OF 34.0 TO 34.9 IN ADULT: ICD-10-CM

## 2024-05-01 DIAGNOSIS — I10 ESSENTIAL HYPERTENSION: ICD-10-CM

## 2024-05-01 DIAGNOSIS — E11.9 TYPE 2 DIABETES MELLITUS WITHOUT COMPLICATION, WITHOUT LONG-TERM CURRENT USE OF INSULIN: Primary | ICD-10-CM

## 2024-05-01 DIAGNOSIS — H10.13 ALLERGIC CONJUNCTIVITIS OF BOTH EYES: ICD-10-CM

## 2024-05-01 PROCEDURE — 3044F HG A1C LEVEL LT 7.0%: CPT | Mod: CPTII,,, | Performed by: NURSE PRACTITIONER

## 2024-05-01 PROCEDURE — 4010F ACE/ARB THERAPY RXD/TAKEN: CPT | Mod: CPTII,,, | Performed by: NURSE PRACTITIONER

## 2024-05-01 PROCEDURE — 99214 OFFICE O/P EST MOD 30 MIN: CPT | Mod: S$PBB,,, | Performed by: NURSE PRACTITIONER

## 2024-05-01 PROCEDURE — 99999 PR PBB SHADOW E&M-EST. PATIENT-LVL V: CPT | Mod: PBBFAC,,, | Performed by: NURSE PRACTITIONER

## 2024-05-01 PROCEDURE — 3008F BODY MASS INDEX DOCD: CPT | Mod: CPTII,,, | Performed by: NURSE PRACTITIONER

## 2024-05-01 PROCEDURE — 3075F SYST BP GE 130 - 139MM HG: CPT | Mod: CPTII,,, | Performed by: NURSE PRACTITIONER

## 2024-05-01 PROCEDURE — 3079F DIAST BP 80-89 MM HG: CPT | Mod: CPTII,,, | Performed by: NURSE PRACTITIONER

## 2024-05-01 PROCEDURE — 1160F RVW MEDS BY RX/DR IN RCRD: CPT | Mod: CPTII,,, | Performed by: NURSE PRACTITIONER

## 2024-05-01 PROCEDURE — 1159F MED LIST DOCD IN RCRD: CPT | Mod: CPTII,,, | Performed by: NURSE PRACTITIONER

## 2024-05-01 PROCEDURE — 99215 OFFICE O/P EST HI 40 MIN: CPT | Mod: PBBFAC,PO | Performed by: NURSE PRACTITIONER

## 2024-05-01 RX ORDER — AZELASTINE HYDROCHLORIDE 0.5 MG/ML
1 SOLUTION/ DROPS OPHTHALMIC 2 TIMES DAILY
Qty: 6 ML | Refills: 0 | OUTPATIENT
Start: 2024-05-01

## 2024-05-01 RX ORDER — CYCLOSPORINE 0.5 MG/ML
1 EMULSION OPHTHALMIC 2 TIMES DAILY
COMMUNITY
Start: 2024-04-10

## 2024-05-01 RX ORDER — CYANOCOBALAMIN 500 UG/1
SPRAY NASAL
COMMUNITY
Start: 2024-04-08

## 2024-05-01 RX ORDER — SEMAGLUTIDE 2.68 MG/ML
2 INJECTION, SOLUTION SUBCUTANEOUS
Qty: 3 ML | Refills: 5 | Status: SHIPPED | OUTPATIENT
Start: 2024-05-01 | End: 2024-10-28

## 2024-05-01 NOTE — PROGRESS NOTES
Subjective:       Patient ID: Marcie Geiger is a 53 y.o. female.    Chief Complaint: Follow-up and Diabetes    Mrs. Geiger returns to clinic for DM/HTN follow up. At last visit, Ozempic was increase to 1 mg. She is tolerating well and has last 4 lbs in past 4 weeks. She would like to further increase dosing for diabetes and weight loss benefit. Eye exam completed at [facility], GIOVANA completed at prior visit, still have not received. BP elevated today in clinic. She did not take medication this morning due to appointment.      Patient Active Problem List   Diagnosis    Type 2 diabetes mellitus without complication, without long-term current use of insulin    Gastroesophageal reflux disease without esophagitis    Atopic dermatitis    Essential hypertension    Class 1 obesity due to excess calories with serious comorbidity and body mass index (BMI) of 34.0 to 34.9 in adult    Vitamin D deficiency       Family History   Problem Relation Name Age of Onset    Diabetes Mother      Heart disease Mother      Heart attack Father      Heart disease Father      Diabetes Brother      Hypertension Brother      Breast cancer Paternal Aunt      Colon cancer Neg Hx      Ovarian cancer Neg Hx       Past Surgical History:   Procedure Laterality Date    bariatic surgery           Current Outpatient Medications:     atorvastatin (LIPITOR) 20 MG tablet, TAKE 1 TABLET(20 MG) BY MOUTH EVERY DAY, Disp: 90 tablet, Rfl: 3    azelastine (OPTIVAR) 0.05 % ophthalmic solution, Place 1 drop into both eyes 2 (two) times daily., Disp: 6 mL, Rfl: 0    blood pressure monitor (BLOOD PRESSURE KIT) Kit, 1 kit by Misc.(Non-Drug; Combo Route) route once daily., Disp: 1 each, Rfl: 0    blood-glucose meter (TRUE METRIX GLUCOSE METER) Misc, USE TWO TIMES A DAY, Disp: 1 each, Rfl: 0    cetirizine (ZYRTEC) 10 MG tablet, TAKE 1 TABLET BY MOUTH ONCE DAILY, Disp: 30 tablet, Rfl: 2    chlorhexidine (PERIDEX) 0.12 % solution, RINSE AND GARGLE 15 ML  BY MOUTH OR THROAT TWICE DAILY AS DIRECTED, Disp: 473 mL, Rfl: 2    chlorhexidine (PERIDEX) 0.12 % solution, 15 mLs 2 (two) times daily., Disp: , Rfl:     ergocalciferol (ERGOCALCIFEROL) 50,000 unit Cap, Take 1 capsule (50,000 Units total) by mouth every 7 days., Disp: 12 capsule, Rfl: 1    lancets (ONETOUCH DELICA PLUS LANCET) 30 gauge Misc, USE AS DIRECTED, Disp: 100 each, Rfl: 11    NASCOBAL 500 mcg/spray nasal spray, SMARTSIG:Both Nares, Disp: , Rfl:     olmesartan (BENICAR) 20 MG tablet, Take 1 tablet (20 mg total) by mouth once daily., Disp: 90 tablet, Rfl: 3    omeprazole (PRILOSEC) 20 MG capsule, Take 1 capsule (20 mg total) by mouth once daily., Disp: 30 capsule, Rfl: 5    RESTASIS 0.05 % ophthalmic emulsion, Place 1 drop into both eyes 2 (two) times daily., Disp: , Rfl:     TRUE METRIX GLUCOSE TEST STRIP Strp, USE TO TEST TWICE DAILY., Disp: 100 strip, Rfl: 11    TRUEPLUS LANCETS 33 gauge Misc, Apply topically 2 (two) times daily., Disp: , Rfl:     semaglutide (OZEMPIC) 2 mg/dose (8 mg/3 mL) PnIj, Inject 2 mg into the skin every 7 days., Disp: 3 mL, Rfl: 5    Review of Systems   Constitutional:  Negative for appetite change, chills, fatigue, fever and unexpected weight change.   Eyes:  Negative for visual disturbance.   Respiratory:  Negative for chest tightness and shortness of breath.    Cardiovascular:  Negative for palpitations and leg swelling.   Gastrointestinal:  Negative for abdominal pain, constipation, diarrhea, nausea and vomiting.   Endocrine: Negative for polydipsia, polyphagia and polyuria.   Genitourinary:  Negative for difficulty urinating.   Musculoskeletal:  Negative for gait problem and myalgias.   Skin:  Negative for rash.   Neurological:  Negative for dizziness, syncope, weakness, light-headedness and headaches.   Psychiatric/Behavioral:  Negative for dysphoric mood. The patient is not nervous/anxious.        Objective:   /88 (BP Location: Left arm, Patient Position: Sitting, BP  "Method: Large (Manual))   Pulse 81   Temp 97.6 °F (36.4 °C)   Resp 18   Ht 5' 8" (1.727 m)   Wt 102.6 kg (226 lb 3.1 oz)   SpO2 97%   BMI 34.39 kg/m²      Physical Exam  Constitutional:       General: She is not in acute distress.     Appearance: Normal appearance. She is obese. She is not ill-appearing.   HENT:      Head: Normocephalic.   Eyes:      Conjunctiva/sclera: Conjunctivae normal.   Cardiovascular:      Rate and Rhythm: Normal rate.   Pulmonary:      Effort: Pulmonary effort is normal. No respiratory distress.   Abdominal:      General: There is no distension.   Musculoskeletal:      Cervical back: Normal range of motion.   Skin:     General: Skin is warm and dry.      Coloration: Skin is not pale.      Findings: No erythema or rash.   Neurological:      Mental Status: She is alert and oriented to person, place, and time.      Coordination: Coordination normal.      Gait: Gait normal.   Psychiatric:         Mood and Affect: Mood normal.         Behavior: Behavior normal.         Assessment & Plan     1. Type 2 diabetes mellitus without complication, without long-term current use of insulin  Assessment & Plan:  Lab Results   Component Value Date    HGBA1C 5.4 04/01/2024     Well controlled. Increase Ozempic dosing for weight loss benefit. Continue to focus on lifestyle modifications. GIOVANA completed at last visit for recent eye exam.     Orders:  -     semaglutide (OZEMPIC) 2 mg/dose (8 mg/3 mL) PnIj; Inject 2 mg into the skin every 7 days.  Dispense: 3 mL; Refill: 5    2. Essential hypertension  Assessment & Plan:  Initially elevated, improved with repeat reading. Did not take olmesartan this morning. Continue olmesartan.      3. Class 1 obesity due to excess calories with serious comorbidity and body mass index (BMI) of 34.0 to 34.9 in adult  Assessment & Plan:  Lost 4 lbs in the past 4 week. Applauded and encouraged to keep it up. Will further increase Ozempic dosing for DM and weight loss " "benefit.            Ramila Banegas, FNP-C      Portions of this note may have been created with voice recognition software. Occasional "wrong-word" or "sound-a-like" substitutions may have occurred due to the inherent limitations of voice recognition software. Please, read the note carefully and recognize, using context, where substitutions have occurred.     "

## 2024-05-01 NOTE — ASSESSMENT & PLAN NOTE
Lost 4 lbs in the past 4 week. Applauded and encouraged to keep it up. Will further increase Ozempic dosing for DM and weight loss benefit.

## 2024-05-01 NOTE — ASSESSMENT & PLAN NOTE
Initially elevated, improved with repeat reading. Did not take olmesartan this morning. Continue olmesartan.

## 2024-05-01 NOTE — ASSESSMENT & PLAN NOTE
Lab Results   Component Value Date    HGBA1C 5.4 04/01/2024     Well controlled. Increase Ozempic dosing for weight loss benefit. Continue to focus on lifestyle modifications. GIOVANA completed at last visit for recent eye exam.

## 2024-05-02 ENCOUNTER — PATIENT MESSAGE (OUTPATIENT)
Dept: INTERNAL MEDICINE | Facility: CLINIC | Age: 54
End: 2024-05-02
Payer: MEDICAID

## 2024-05-02 RX ORDER — AZELASTINE HYDROCHLORIDE 0.5 MG/ML
1 SOLUTION/ DROPS OPHTHALMIC 2 TIMES DAILY
Qty: 6 ML | Refills: 0 | Status: SHIPPED | OUTPATIENT
Start: 2024-05-02 | End: 2025-05-02

## 2024-05-02 NOTE — TELEPHONE ENCOUNTER
----- Message from Maximino Gomez sent at 5/2/2024  8:10 AM CDT -----  Contact: [phone]  Pt is needing a call back regarding eyedrops that she takes daily. Pt states she did speak with a nurse. Pt takes eyedrops daily and needs a refill azelastine (OPTIVAR) 0.05 % ophthalmic solution       [id number] - [place], [place] - [address] [address] AT [facility] [address] [address]  [place] [address]  Phone: [phone] Fax: [phone]

## 2024-05-02 NOTE — TELEPHONE ENCOUNTER
Spoke with patient and advised her that I sent for approval yesterday but it was not sent yet. I am checking on the Rx now.

## 2024-05-10 RX ORDER — ATORVASTATIN CALCIUM 20 MG/1
TABLET, FILM COATED ORAL
Qty: 90 TABLET | Refills: 3 | Status: SHIPPED | OUTPATIENT
Start: 2024-05-10

## 2024-05-24 DIAGNOSIS — K05.10 GINGIVITIS: ICD-10-CM

## 2024-05-24 RX ORDER — CHLORHEXIDINE GLUCONATE ORAL RINSE 1.2 MG/ML
SOLUTION DENTAL
Qty: 473 ML | Refills: 2 | Status: SHIPPED | OUTPATIENT
Start: 2024-05-24

## 2024-07-01 ENCOUNTER — TELEPHONE (OUTPATIENT)
Dept: INTERNAL MEDICINE | Facility: CLINIC | Age: 54
End: 2024-07-01
Payer: MEDICAID

## 2024-07-05 DIAGNOSIS — I10 ESSENTIAL HYPERTENSION: ICD-10-CM

## 2024-07-05 RX ORDER — OLMESARTAN MEDOXOMIL 20 MG/1
20 TABLET ORAL
Qty: 90 TABLET | Refills: 0 | Status: SHIPPED | OUTPATIENT
Start: 2024-07-05

## 2024-07-22 ENCOUNTER — OFFICE VISIT (OUTPATIENT)
Dept: INTERNAL MEDICINE | Facility: CLINIC | Age: 54
End: 2024-07-22
Payer: MEDICAID

## 2024-07-22 ENCOUNTER — LAB VISIT (OUTPATIENT)
Dept: LAB | Facility: HOSPITAL | Age: 54
End: 2024-07-22
Attending: STUDENT IN AN ORGANIZED HEALTH CARE EDUCATION/TRAINING PROGRAM
Payer: MEDICAID

## 2024-07-22 VITALS
DIASTOLIC BLOOD PRESSURE: 84 MMHG | BODY MASS INDEX: 32.24 KG/M2 | HEART RATE: 97 BPM | OXYGEN SATURATION: 97 % | WEIGHT: 212.75 LBS | SYSTOLIC BLOOD PRESSURE: 138 MMHG | TEMPERATURE: 97 F | HEIGHT: 68 IN

## 2024-07-22 DIAGNOSIS — H10.13 ALLERGIC CONJUNCTIVITIS OF BOTH EYES: ICD-10-CM

## 2024-07-22 DIAGNOSIS — E78.49 OTHER HYPERLIPIDEMIA: ICD-10-CM

## 2024-07-22 DIAGNOSIS — H04.122 DRY EYE OF LEFT SIDE: ICD-10-CM

## 2024-07-22 DIAGNOSIS — R21 SKIN RASH: ICD-10-CM

## 2024-07-22 DIAGNOSIS — I10 ESSENTIAL HYPERTENSION: ICD-10-CM

## 2024-07-22 DIAGNOSIS — E55.9 VITAMIN D DEFICIENCY: ICD-10-CM

## 2024-07-22 DIAGNOSIS — K05.10 GINGIVITIS: ICD-10-CM

## 2024-07-22 DIAGNOSIS — Z12.11 SCREENING FOR COLON CANCER: ICD-10-CM

## 2024-07-22 DIAGNOSIS — E66.09 CLASS 1 OBESITY DUE TO EXCESS CALORIES WITH SERIOUS COMORBIDITY AND BODY MASS INDEX (BMI) OF 34.0 TO 34.9 IN ADULT: ICD-10-CM

## 2024-07-22 DIAGNOSIS — Z90.3 H/O LAPAROSCOPIC PARTIAL GASTRECTOMY: ICD-10-CM

## 2024-07-22 DIAGNOSIS — I10 ESSENTIAL HYPERTENSION: Primary | ICD-10-CM

## 2024-07-22 DIAGNOSIS — K21.9 GASTROESOPHAGEAL REFLUX DISEASE WITHOUT ESOPHAGITIS: ICD-10-CM

## 2024-07-22 DIAGNOSIS — E11.9 TYPE 2 DIABETES MELLITUS WITHOUT COMPLICATION, WITHOUT LONG-TERM CURRENT USE OF INSULIN: ICD-10-CM

## 2024-07-22 DIAGNOSIS — Z12.31 SCREENING MAMMOGRAM FOR BREAST CANCER: ICD-10-CM

## 2024-07-22 DIAGNOSIS — J30.9 ALLERGIC RHINITIS, UNSPECIFIED SEASONALITY, UNSPECIFIED TRIGGER: ICD-10-CM

## 2024-07-22 LAB
ALBUMIN SERPL BCP-MCNC: 3.7 G/DL (ref 3.5–5.2)
ALP SERPL-CCNC: 81 U/L (ref 55–135)
ALT SERPL W/O P-5'-P-CCNC: 15 U/L (ref 10–44)
ANION GAP SERPL CALC-SCNC: 11 MMOL/L (ref 8–16)
AST SERPL-CCNC: 19 U/L (ref 10–40)
BASOPHILS # BLD AUTO: 0.04 K/UL (ref 0–0.2)
BASOPHILS NFR BLD: 0.7 % (ref 0–1.9)
BILIRUB SERPL-MCNC: 0.4 MG/DL (ref 0.1–1)
BUN SERPL-MCNC: 6 MG/DL (ref 6–20)
CALCIUM SERPL-MCNC: 9.6 MG/DL (ref 8.7–10.5)
CHLORIDE SERPL-SCNC: 107 MMOL/L (ref 95–110)
CHOLEST SERPL-MCNC: 153 MG/DL (ref 120–199)
CHOLEST/HDLC SERPL: 3.7 {RATIO} (ref 2–5)
CO2 SERPL-SCNC: 25 MMOL/L (ref 23–29)
CREAT SERPL-MCNC: 0.8 MG/DL (ref 0.5–1.4)
DIFFERENTIAL METHOD BLD: ABNORMAL
EOSINOPHIL # BLD AUTO: 0.1 K/UL (ref 0–0.5)
EOSINOPHIL NFR BLD: 1 % (ref 0–8)
ERYTHROCYTE [DISTWIDTH] IN BLOOD BY AUTOMATED COUNT: 14.5 % (ref 11.5–14.5)
EST. GFR  (NO RACE VARIABLE): >60 ML/MIN/1.73 M^2
ESTIMATED AVG GLUCOSE: 103 MG/DL (ref 68–131)
GLUCOSE SERPL-MCNC: 89 MG/DL (ref 70–110)
HBA1C MFR BLD: 5.2 % (ref 4–5.6)
HCT VFR BLD AUTO: 38.8 % (ref 37–48.5)
HDLC SERPL-MCNC: 41 MG/DL (ref 40–75)
HDLC SERPL: 26.8 % (ref 20–50)
HGB BLD-MCNC: 12.9 G/DL (ref 12–16)
IMM GRANULOCYTES # BLD AUTO: 0.01 K/UL (ref 0–0.04)
IMM GRANULOCYTES NFR BLD AUTO: 0.2 % (ref 0–0.5)
LDLC SERPL CALC-MCNC: 98.4 MG/DL (ref 63–159)
LYMPHOCYTES # BLD AUTO: 2.1 K/UL (ref 1–4.8)
LYMPHOCYTES NFR BLD: 36 % (ref 18–48)
MCH RBC QN AUTO: 26.9 PG (ref 27–31)
MCHC RBC AUTO-ENTMCNC: 33.2 G/DL (ref 32–36)
MCV RBC AUTO: 81 FL (ref 82–98)
MONOCYTES # BLD AUTO: 0.6 K/UL (ref 0.3–1)
MONOCYTES NFR BLD: 9.5 % (ref 4–15)
NEUTROPHILS # BLD AUTO: 3.1 K/UL (ref 1.8–7.7)
NEUTROPHILS NFR BLD: 52.6 % (ref 38–73)
NONHDLC SERPL-MCNC: 112 MG/DL
NRBC BLD-RTO: 0 /100 WBC
PLATELET # BLD AUTO: 244 K/UL (ref 150–450)
PMV BLD AUTO: 10.7 FL (ref 9.2–12.9)
POTASSIUM SERPL-SCNC: 4 MMOL/L (ref 3.5–5.1)
PROT SERPL-MCNC: 7.4 G/DL (ref 6–8.4)
RBC # BLD AUTO: 4.79 M/UL (ref 4–5.4)
SODIUM SERPL-SCNC: 143 MMOL/L (ref 136–145)
TRIGL SERPL-MCNC: 68 MG/DL (ref 30–150)
WBC # BLD AUTO: 5.92 K/UL (ref 3.9–12.7)

## 2024-07-22 PROCEDURE — 1159F MED LIST DOCD IN RCRD: CPT | Mod: CPTII,,, | Performed by: STUDENT IN AN ORGANIZED HEALTH CARE EDUCATION/TRAINING PROGRAM

## 2024-07-22 PROCEDURE — 36415 COLL VENOUS BLD VENIPUNCTURE: CPT | Mod: PO | Performed by: STUDENT IN AN ORGANIZED HEALTH CARE EDUCATION/TRAINING PROGRAM

## 2024-07-22 PROCEDURE — 85025 COMPLETE CBC W/AUTO DIFF WBC: CPT | Mod: PO | Performed by: STUDENT IN AN ORGANIZED HEALTH CARE EDUCATION/TRAINING PROGRAM

## 2024-07-22 PROCEDURE — 3008F BODY MASS INDEX DOCD: CPT | Mod: CPTII,,, | Performed by: STUDENT IN AN ORGANIZED HEALTH CARE EDUCATION/TRAINING PROGRAM

## 2024-07-22 PROCEDURE — 99213 OFFICE O/P EST LOW 20 MIN: CPT | Mod: PBBFAC,PO | Performed by: STUDENT IN AN ORGANIZED HEALTH CARE EDUCATION/TRAINING PROGRAM

## 2024-07-22 PROCEDURE — 99999 PR PBB SHADOW E&M-EST. PATIENT-LVL III: CPT | Mod: PBBFAC,,, | Performed by: STUDENT IN AN ORGANIZED HEALTH CARE EDUCATION/TRAINING PROGRAM

## 2024-07-22 PROCEDURE — 83036 HEMOGLOBIN GLYCOSYLATED A1C: CPT | Performed by: STUDENT IN AN ORGANIZED HEALTH CARE EDUCATION/TRAINING PROGRAM

## 2024-07-22 PROCEDURE — 4010F ACE/ARB THERAPY RXD/TAKEN: CPT | Mod: CPTII,,, | Performed by: STUDENT IN AN ORGANIZED HEALTH CARE EDUCATION/TRAINING PROGRAM

## 2024-07-22 PROCEDURE — 80053 COMPREHEN METABOLIC PANEL: CPT | Mod: PO | Performed by: STUDENT IN AN ORGANIZED HEALTH CARE EDUCATION/TRAINING PROGRAM

## 2024-07-22 PROCEDURE — 3075F SYST BP GE 130 - 139MM HG: CPT | Mod: CPTII,,, | Performed by: STUDENT IN AN ORGANIZED HEALTH CARE EDUCATION/TRAINING PROGRAM

## 2024-07-22 PROCEDURE — 3044F HG A1C LEVEL LT 7.0%: CPT | Mod: CPTII,,, | Performed by: STUDENT IN AN ORGANIZED HEALTH CARE EDUCATION/TRAINING PROGRAM

## 2024-07-22 PROCEDURE — 99214 OFFICE O/P EST MOD 30 MIN: CPT | Mod: S$PBB,,, | Performed by: STUDENT IN AN ORGANIZED HEALTH CARE EDUCATION/TRAINING PROGRAM

## 2024-07-22 PROCEDURE — 1160F RVW MEDS BY RX/DR IN RCRD: CPT | Mod: CPTII,,, | Performed by: STUDENT IN AN ORGANIZED HEALTH CARE EDUCATION/TRAINING PROGRAM

## 2024-07-22 PROCEDURE — 80061 LIPID PANEL: CPT | Performed by: STUDENT IN AN ORGANIZED HEALTH CARE EDUCATION/TRAINING PROGRAM

## 2024-07-22 PROCEDURE — 3079F DIAST BP 80-89 MM HG: CPT | Mod: CPTII,,, | Performed by: STUDENT IN AN ORGANIZED HEALTH CARE EDUCATION/TRAINING PROGRAM

## 2024-07-22 RX ORDER — OLMESARTAN MEDOXOMIL 20 MG/1
20 TABLET ORAL DAILY
Qty: 90 TABLET | Refills: 1 | Status: SHIPPED | OUTPATIENT
Start: 2024-07-22 | End: 2025-01-18

## 2024-07-22 RX ORDER — OMEPRAZOLE 20 MG/1
20 CAPSULE, DELAYED RELEASE ORAL DAILY
Qty: 30 CAPSULE | Refills: 5 | Status: SHIPPED | OUTPATIENT
Start: 2024-07-22

## 2024-07-22 RX ORDER — ERGOCALCIFEROL 1.25 MG/1
50000 CAPSULE ORAL
Qty: 12 CAPSULE | Refills: 1 | Status: SHIPPED | OUTPATIENT
Start: 2024-07-22 | End: 2025-01-18

## 2024-07-22 RX ORDER — CYCLOSPORINE 0.5 MG/ML
1 EMULSION OPHTHALMIC 2 TIMES DAILY
Qty: 60 EACH | Refills: 2 | Status: SHIPPED | OUTPATIENT
Start: 2024-07-22

## 2024-07-22 RX ORDER — CHLORHEXIDINE GLUCONATE ORAL RINSE 1.2 MG/ML
15 SOLUTION DENTAL 2 TIMES DAILY
Qty: 118 ML | Refills: 0 | Status: SHIPPED | OUTPATIENT
Start: 2024-07-22

## 2024-07-22 RX ORDER — ATORVASTATIN CALCIUM 20 MG/1
20 TABLET, FILM COATED ORAL DAILY
Qty: 90 TABLET | Refills: 3 | Status: SHIPPED | OUTPATIENT
Start: 2024-07-22

## 2024-07-22 RX ORDER — CYANOCOBALAMIN 500 UG/1
500 SPRAY NASAL
Qty: 25 EACH | Refills: 0 | Status: SHIPPED | OUTPATIENT
Start: 2024-07-22 | End: 2025-01-18

## 2024-07-22 RX ORDER — CETIRIZINE HYDROCHLORIDE 10 MG/1
10 TABLET ORAL DAILY
Qty: 30 TABLET | Refills: 2 | Status: SHIPPED | OUTPATIENT
Start: 2024-07-22

## 2024-07-22 RX ORDER — AZELASTINE HYDROCHLORIDE 0.5 MG/ML
1 SOLUTION/ DROPS OPHTHALMIC 2 TIMES DAILY
Qty: 6 ML | Refills: 0 | Status: SHIPPED | OUTPATIENT
Start: 2024-07-22 | End: 2025-07-22

## 2024-07-22 RX ORDER — SEMAGLUTIDE 2.68 MG/ML
2 INJECTION, SOLUTION SUBCUTANEOUS
Qty: 3 ML | Refills: 5 | Status: SHIPPED | OUTPATIENT
Start: 2024-07-22 | End: 2025-01-18

## 2024-07-22 RX ORDER — HYDROCORTISONE 1 %
CREAM (GRAM) TOPICAL
Qty: 26 G | Refills: 0 | Status: SHIPPED | OUTPATIENT
Start: 2024-07-22

## 2024-07-22 NOTE — PROGRESS NOTES
HPI:   Patient is here for medication refill. Medication list has been reviewed along with the patient. She reports to be compliant with the patient and has no issues taking it. She denies having shortness of breath, chest pain, palpitation, leg swelling, nausea vomiting and other complains. No other concerns and issues at present.  This is the first time I am seeing her. Her Pm hx is significant for h/o laparoscopic partial gastrectomy in [place], [place] around 2 years back. Since then, she is on vitamin b 12 supplement. She wants refill on all of her medication. She also claims to have dry eye and follows ophthalmology and [facility]. GIOVANA was obtained on her last visit which is still awaited.   She is a non smoker, denies taking drugs and drinks alcohol socially. Her family history is significant for having breast cancer to her aunt. She is without any complains today. I will repeat her lab today.   Problem List:  Patient Active Problem List   Diagnosis    Type 2 diabetes mellitus without complication, without long-term current use of insulin    Gastroesophageal reflux disease without esophagitis    Atopic dermatitis    Essential hypertension    Class 1 obesity due to excess calories with serious comorbidity and body mass index (BMI) of 34.0 to 34.9 in adult    Vitamin D deficiency     ROS: Negative except as noted above.     Current Meds:  Current Outpatient Medications   Medication Sig Dispense Refill    blood-glucose meter (TRUE METRIX GLUCOSE METER) Misc USE TWO TIMES A DAY 1 each 0    lancets (ONETOUCH DELICA PLUS LANCET) 30 gauge Misc USE AS DIRECTED 100 each 11    TRUE METRIX GLUCOSE TEST STRIP Strp USE TO TEST TWICE DAILY. 100 strip 11    TRUEPLUS LANCETS 33 gauge Misc Apply topically 2 (two) times daily.      atorvastatin (LIPITOR) 20 MG tablet Take 1 tablet (20 mg total) by mouth once daily. 90 tablet 3    azelastine (OPTIVAR) 0.05 % ophthalmic solution Place 1 drop into both eyes 2 (two) times  daily. 6 mL 0    blood pressure monitor (BLOOD PRESSURE KIT) Kit 1 kit by Misc.(Non-Drug; Combo Route) route once daily. 1 each 0    cetirizine (ZYRTEC) 10 MG tablet Take 1 tablet (10 mg total) by mouth once daily. 30 tablet 2    chlorhexidine (PERIDEX) 0.12 % solution RINSE AND GARGLE 15 ML BY MOUTH OR THROAT TWICE DAILY AS DIRECTED 473 mL 2    chlorhexidine (PERIDEX) 0.12 % solution Use as directed 15 mLs in the mouth or throat 2 (two) times daily. 118 mL 0    ergocalciferol (ERGOCALCIFEROL) 50,000 unit Cap Take 1 capsule (50,000 Units total) by mouth every 7 days. 12 capsule 1    hydrocortisone 1 % cream Apply twice a day 26 g 0    NASCOBAL 500 mcg/spray nasal spray 500 mcg by Nasal route every 7 days. 25 each 0    olmesartan (BENICAR) 20 MG tablet Take 1 tablet (20 mg total) by mouth once daily. 90 tablet 1    omeprazole (PRILOSEC) 20 MG capsule Take 1 capsule (20 mg total) by mouth once daily. 30 capsule 5    RESTASIS 0.05 % ophthalmic emulsion Place 1 drop into both eyes 2 (two) times daily. 60 each 2    semaglutide (OZEMPIC) 2 mg/dose (8 mg/3 mL) PnIj Inject 2 mg into the skin every 7 days. 3 mL 5     No current facility-administered medications for this visit.      PE:  BP: 138/84  Pulse: 97     Temp: 97.4 °F (36.3 °C)  Weight: 96.5 kg (212 lb 11.9 oz) Body mass index is 32.35 kg/m².    Wt Readings from Last 5 Encounters:   07/22/24 96.5 kg (212 lb 11.9 oz)   05/01/24 102.6 kg (226 lb 3.1 oz)   04/01/24 104.4 kg (230 lb 2.6 oz)   09/27/23 102.6 kg (226 lb 3.1 oz)   03/27/23 105.3 kg (232 lb 2.3 oz)     General appearance: alert and cooperative, not in acute distress  Head: normocephalic, without obvious abnormality, atraumatic  Eyes: conjunctivae/corneas clear. PERRL, EOM's intact.  Ears: clear tympanic membranes   Neck: no adenopathy, supple, symmetrical, trachea midline and thyroid not enlarged, symmetric, no tenderness/mass/nodules, no JVD  Throat: lips, mucosa, and tongue normal; teeth and gums normal;  no thrush  Chest: no reproducible chest pain   Heart: regular rate and rhythm, S1, S2 normal, no murmur, click, rub or gallop  Lungs: unlabored respiration, bilateral equal air entry, normal vesicular breath sound heard, no wheezing, rhonchi   Abdomen: soft, non-tender, non-distended; bowel sounds +; no masses,  no organomegaly, no ascites   Extremities: normal, atraumatic, no cyanosis or edema noted B/L upper and lower extremities.  Skin: skin color, texture, turgor normal. No rashes or lesions noted.  Neurologic: grossly intact      Lab:  Lab Results   Component Value Date    WBC 7.52 09/27/2023    HGB 13.2 09/27/2023    HCT 41.0 09/27/2023    MCV 83 09/27/2023     09/27/2023     04/01/2024    K 4.2 04/01/2024     04/01/2024    CO2 27 04/01/2024    BUN 9 04/01/2024    GLU 95 04/01/2024    CALCIUM 9.5 04/01/2024    MG 2.1 09/05/2018    PHOS 2.9 09/05/2018    PHOS 2.9 09/05/2018    AST 24 04/01/2024    ALT 18 04/01/2024    CHOL 171 09/27/2023    HDL 45 09/27/2023    LDLCALC 111.0 09/27/2023    TRIG 75 09/27/2023    TSH 1.007 09/27/2023       Impression:    ICD-10-CM ICD-9-CM    1. Essential hypertension  I10 401.9 olmesartan (BENICAR) 20 MG tablet      CBC W/ AUTO DIFFERENTIAL      2. Type 2 diabetes mellitus without complication, without long-term current use of insulin  E11.9 250.00 semaglutide (OZEMPIC) 2 mg/dose (8 mg/3 mL) PnIj      COMPREHENSIVE METABOLIC PANEL      HEMOGLOBIN A1C      3. Class 1 obesity due to excess calories with serious comorbidity and body mass index (BMI) of 34.0 to 34.9 in adult  E66.09 278.00     Z68.34 V85.34       4. Allergic conjunctivitis of both eyes  H10.13 372.14 azelastine (OPTIVAR) 0.05 % ophthalmic solution      5. Allergic rhinitis, unspecified seasonality, unspecified trigger  J30.9 477.9 cetirizine (ZYRTEC) 10 MG tablet      6. Vitamin D deficiency  E55.9 268.9 ergocalciferol (ERGOCALCIFEROL) 50,000 unit Cap      7. Gastroesophageal reflux disease without  esophagitis  K21.9 530.81 omeprazole (PRILOSEC) 20 MG capsule      8. Other hyperlipidemia  E78.49 272.4 atorvastatin (LIPITOR) 20 MG tablet      LIPID PANEL      9. H/O laparoscopic partial gastrectomy  Z90.3 V15.29 NASCOBAL 500 mcg/spray nasal spray      10. Dry eye of left side  H04.122 375.15 RESTASIS 0.05 % ophthalmic emulsion      11. Gingivitis  K05.10 523.10 chlorhexidine (PERIDEX) 0.12 % solution      12. Skin rash  R21 782.1 hydrocortisone 1 % cream        1. Essential hypertension  Low salt diet.  Enouraged to increase in physical activity at least 30 minutes of brisk walking/day , 5 days a week  Advised weight loss    Advised for DASH diet - The Dietary Approaches to Stop Hypertension (DASH) is high in vegetables, fruits, low-fat dairy products, whole grains, poultry, fish, and nuts and low in sweets, sugar-sweetened beverages, and red meats   Stop smoking.  Limit caffeine intake  Limit alcohol intake <3/day for men and <2/day for women.  Advised to be compliant with medication.  Please monitor your blood pressure regularly at home   Return precaution provided  normtoensive  - olmesartan (BENICAR) 20 MG tablet; Take 1 tablet (20 mg total) by mouth once daily.  Dispense: 90 tablet; Refill: 1  - CBC W/ AUTO DIFFERENTIAL; Future    2. Type 2 diabetes mellitus without complication, without long-term current use of insulin   Latest Reference Range & Units 03/08/21 10:06 09/09/21 09:05 03/18/22 09:01 09/21/22 09:18 03/27/23 09:35 09/27/23 09:12 04/01/24 09:18   Hemoglobin A1C External 4.0 - 5.6 % 5.4 6.0 (H) 5.9 (H) 5.8 (H) 5.4 5.2 5.4   Estimated Avg Glucose 68 - 131 mg/dL 108 126 123 120 108 103 108   (H): Data is abnormally high  Well controlled   - semaglutide (OZEMPIC) 2 mg/dose (8 mg/3 mL) PnIj; Inject 2 mg into the skin every 7 days.  Dispense: 3 mL; Refill: 5  - COMPREHENSIVE METABOLIC PANEL; Future  - HEMOGLOBIN A1C; Future    3. Class 1 obesity due to excess calories with serious comorbidity and body  mass index (BMI) of 34.0 to 34.9 in adult  S/p partial gastrectomy   On ozempic for weight loss     4. Allergic conjunctivitis of both eyes  - azelastine (OPTIVAR) 0.05 % ophthalmic solution; Place 1 drop into both eyes 2 (two) times daily.  Dispense: 6 mL; Refill: 0    5. Allergic rhinitis, unspecified seasonality, unspecified trigger  - cetirizine (ZYRTEC) 10 MG tablet; Take 1 tablet (10 mg total) by mouth once daily.  Dispense: 30 tablet; Refill: 2    6. Vitamin D deficiency  - ergocalciferol (ERGOCALCIFEROL) 50,000 unit Cap; Take 1 capsule (50,000 Units total) by mouth every 7 days.  Dispense: 12 capsule; Refill: 1    7. Gastroesophageal reflux disease without esophagitis  - omeprazole (PRILOSEC) 20 MG capsule; Take 1 capsule (20 mg total) by mouth once daily.  Dispense: 30 capsule; Refill: 5    8. Other hyperlipidemia  - atorvastatin (LIPITOR) 20 MG tablet; Take 1 tablet (20 mg total) by mouth once daily.  Dispense: 90 tablet; Refill: 3  - LIPID PANEL; Future    9. H/O laparoscopic partial gastrectomy  - NASCOBAL 500 mcg/spray nasal spray; 500 mcg by Nasal route every 7 days.  Dispense: 25 each; Refill: 0    10. Dry eye of left side  - RESTASIS 0.05 % ophthalmic emulsion; Place 1 drop into both eyes 2 (two) times daily.  Dispense: 60 each; Refill: 2    11. Gingivitis  - chlorhexidine (PERIDEX) 0.12 % solution; Use as directed 15 mLs in the mouth or throat 2 (two) times daily.  Dispense: 118 mL; Refill: 0    12. Skin rash  - hydrocortisone 1 % cream; Apply twice a day  Dispense: 26 g; Refill: 0      13. Screening for colon cancer  - Cologuard Screening (Multitarget Stool DNA); Future  - Cologuard Screening (Multitarget Stool DNA)    14. Screening mammogram for breast cancer  - Mammo Digital Screening Bilat w/ Richy; Future      Future Appointments   Date Time Provider Department [place]   7/22/2024  8:40 AM [facility] [place]   10/31/2024  7:00 AM Tom Yeung MD [facility] IM [place]     Repeat  lab  Medication to be adjusted on the basis of lab  Will call her with the results.    Tom Yeung MD

## 2024-07-24 ENCOUNTER — TELEPHONE (OUTPATIENT)
Dept: INTERNAL MEDICINE | Facility: CLINIC | Age: 54
End: 2024-07-24
Payer: MEDICAID

## 2024-07-24 NOTE — TELEPHONE ENCOUNTER
Pt called inquiring about paper or form with [employer] that needs to be filled out with her insurance to receive gift card for being treated for diabetes and high blood pressure. Pt advised that I would look into it but was not familiar with the program. Pt gave phone # [phone].

## 2024-08-19 DIAGNOSIS — H10.13 ALLERGIC CONJUNCTIVITIS OF BOTH EYES: Primary | ICD-10-CM

## 2024-08-19 RX ORDER — OLOPATADINE HYDROCHLORIDE 1 MG/ML
1 SOLUTION/ DROPS OPHTHALMIC 2 TIMES DAILY
Qty: 5 ML | Refills: 2 | Status: SHIPPED | OUTPATIENT
Start: 2024-08-19

## 2024-08-19 NOTE — TELEPHONE ENCOUNTER
Refill Routing Note   Medication(s) are not appropriate for processing by [facility] for the following reason(s):        Outside of protocol    [facility] action(s):  Route             Appointments  past 12m or future 3m with PCP    Date Provider   Last Visit   7/22/2024 Tom Yeung MD   Next Visit   8/20/2024 Tom Yeung MD   ED visits in past 90 days: 0        Note composed:2:29 PM 08/19/2024

## 2024-08-20 ENCOUNTER — OFFICE VISIT (OUTPATIENT)
Dept: INTERNAL MEDICINE | Facility: CLINIC | Age: 54
End: 2024-08-20
Payer: MEDICAID

## 2024-08-20 VITALS
WEIGHT: 209.19 LBS | TEMPERATURE: 99 F | HEIGHT: 68 IN | SYSTOLIC BLOOD PRESSURE: 134 MMHG | DIASTOLIC BLOOD PRESSURE: 84 MMHG | OXYGEN SATURATION: 98 % | BODY MASS INDEX: 31.71 KG/M2 | HEART RATE: 100 BPM

## 2024-08-20 DIAGNOSIS — E55.9 VITAMIN D DEFICIENCY: ICD-10-CM

## 2024-08-20 DIAGNOSIS — K05.10 GINGIVITIS: ICD-10-CM

## 2024-08-20 DIAGNOSIS — M79.675 TOE PAIN, LEFT: Primary | ICD-10-CM

## 2024-08-20 PROCEDURE — 3075F SYST BP GE 130 - 139MM HG: CPT | Mod: CPTII,,, | Performed by: STUDENT IN AN ORGANIZED HEALTH CARE EDUCATION/TRAINING PROGRAM

## 2024-08-20 PROCEDURE — 3008F BODY MASS INDEX DOCD: CPT | Mod: CPTII,,, | Performed by: STUDENT IN AN ORGANIZED HEALTH CARE EDUCATION/TRAINING PROGRAM

## 2024-08-20 PROCEDURE — 99213 OFFICE O/P EST LOW 20 MIN: CPT | Mod: S$PBB,,, | Performed by: STUDENT IN AN ORGANIZED HEALTH CARE EDUCATION/TRAINING PROGRAM

## 2024-08-20 PROCEDURE — 3079F DIAST BP 80-89 MM HG: CPT | Mod: CPTII,,, | Performed by: STUDENT IN AN ORGANIZED HEALTH CARE EDUCATION/TRAINING PROGRAM

## 2024-08-20 PROCEDURE — 1160F RVW MEDS BY RX/DR IN RCRD: CPT | Mod: CPTII,,, | Performed by: STUDENT IN AN ORGANIZED HEALTH CARE EDUCATION/TRAINING PROGRAM

## 2024-08-20 PROCEDURE — 4010F ACE/ARB THERAPY RXD/TAKEN: CPT | Mod: CPTII,,, | Performed by: STUDENT IN AN ORGANIZED HEALTH CARE EDUCATION/TRAINING PROGRAM

## 2024-08-20 PROCEDURE — 99999 PR PBB SHADOW E&M-EST. PATIENT-LVL V: CPT | Mod: PBBFAC,,, | Performed by: STUDENT IN AN ORGANIZED HEALTH CARE EDUCATION/TRAINING PROGRAM

## 2024-08-20 PROCEDURE — G2211 COMPLEX E/M VISIT ADD ON: HCPCS | Mod: S$PBB,,, | Performed by: STUDENT IN AN ORGANIZED HEALTH CARE EDUCATION/TRAINING PROGRAM

## 2024-08-20 PROCEDURE — 99215 OFFICE O/P EST HI 40 MIN: CPT | Mod: PBBFAC,PO | Performed by: STUDENT IN AN ORGANIZED HEALTH CARE EDUCATION/TRAINING PROGRAM

## 2024-08-20 PROCEDURE — 3044F HG A1C LEVEL LT 7.0%: CPT | Mod: CPTII,,, | Performed by: STUDENT IN AN ORGANIZED HEALTH CARE EDUCATION/TRAINING PROGRAM

## 2024-08-20 PROCEDURE — 1159F MED LIST DOCD IN RCRD: CPT | Mod: CPTII,,, | Performed by: STUDENT IN AN ORGANIZED HEALTH CARE EDUCATION/TRAINING PROGRAM

## 2024-08-20 RX ORDER — CHLORHEXIDINE GLUCONATE ORAL RINSE 1.2 MG/ML
15 SOLUTION DENTAL 2 TIMES DAILY
Qty: 473 ML | Refills: 2 | Status: SHIPPED | OUTPATIENT
Start: 2024-08-20

## 2024-08-20 RX ORDER — ERGOCALCIFEROL 1.25 MG/1
50000 CAPSULE ORAL
Qty: 12 CAPSULE | Refills: 1 | Status: SHIPPED | OUTPATIENT
Start: 2024-08-20 | End: 2025-02-16

## 2024-08-20 NOTE — PROGRESS NOTES
Chief Complaint   Patient presents with    Toe Injury     HPI: Marcie Geiger is a 53 y.o. female  with Pmhx listed below who presents to clinic for evaluation to toe nail injury. As per the patient, she bumped her nail few weeks back. Since then, she has been complaining of nail pain. Recently she started noticing bluish discoloration in her nail bed. She denies having, pain, redness of the skin and discharge. She denies having bleeding. She reports to be in pain initially but now it is getting better. She was given an option to wait and watch as it is likely to get better in days to come. She wants to see podiatry instead.       Her other problem today include rash in her abdomen. She reports that it was present since she had laparoscopy gastrectomy in her abdomen. She was treated with hydrocortisone on her last viist. She couldn't fill up the prescription as it was not covered by her insurance. I explained her that it most likely post surgical skin changes and it is unlikely that it will get better. She wishes to see a dermatology for that regards. The issue is her insurance. I cannot make a referral to [facility] [place]. She doesn't want to go to [place]. I asked her to call her insurance and let me know about the provider should she decide to see dermatology in the future . She voiced understanding.  Problem List:  Patient Active Problem List   Diagnosis    Type 2 diabetes mellitus without complication, without long-term current use of insulin    Gastroesophageal reflux disease without esophagitis    Atopic dermatitis    Essential hypertension    Class 1 obesity due to excess calories with serious comorbidity and body mass index (BMI) of 34.0 to 34.9 in adult    Vitamin D deficiency       ROS: Negative except as noted above.       Current Meds:  Current Outpatient Medications   Medication Sig Dispense Refill    atorvastatin (LIPITOR) 20 MG tablet Take 1 tablet (20 mg total) by mouth once  daily. 90 tablet 3    blood pressure monitor (BLOOD PRESSURE KIT) Kit 1 kit by Misc.(Non-Drug; Combo Route) route once daily. 1 each 0    blood-glucose meter (TRUE METRIX GLUCOSE METER) Misc USE TWO TIMES A DAY 1 each 0    cetirizine (ZYRTEC) 10 MG tablet Take 1 tablet (10 mg total) by mouth once daily. 30 tablet 2    chlorhexidine (PERIDEX) 0.12 % solution Use as directed 15 mLs in the mouth or throat 2 (two) times daily. 118 mL 0    hydrocortisone 1 % cream Apply twice a day 26 g 0    lancets (ONETOUCH DELICA PLUS LANCET) 30 gauge Misc USE AS DIRECTED 100 each 11    NASCOBAL 500 mcg/spray nasal spray 500 mcg by Nasal route every 7 days. 25 each 0    olmesartan (BENICAR) 20 MG tablet Take 1 tablet (20 mg total) by mouth once daily. 90 tablet 1    olopatadine (PATANOL) 0.1 % ophthalmic solution INSTILL 1 DROP INTO LEFT EYE TWICE DAILY 5 mL 2    omeprazole (PRILOSEC) 20 MG capsule Take 1 capsule (20 mg total) by mouth once daily. 30 capsule 5    RESTASIS 0.05 % ophthalmic emulsion Place 1 drop into both eyes 2 (two) times daily. 60 each 2    semaglutide (OZEMPIC) 2 mg/dose (8 mg/3 mL) PnIj Inject 2 mg into the skin every 7 days. 3 mL 5    TRUE METRIX GLUCOSE TEST STRIP Strp USE TO TEST TWICE DAILY. 100 strip 11    TRUEPLUS LANCETS 33 gauge Misc Apply topically 2 (two) times daily.      chlorhexidine (PERIDEX) 0.12 % solution Use as directed 15 mLs in the mouth or throat 2 (two) times daily. 473 mL 2    ergocalciferol (ERGOCALCIFEROL) 50,000 unit Cap Take 1 capsule (50,000 Units total) by mouth every 7 days. 12 capsule 1     No current facility-administered medications for this visit.      PE:  BP: 134/84  Pulse: 100     Temp: 98.7 °F (37.1 °C)  Weight: 94.9 kg (209 lb 3.5 oz) Body mass index is 31.81 kg/m².    Wt Readings from Last 5 Encounters:   08/20/24 94.9 kg (209 lb 3.5 oz)   07/22/24 96.5 kg (212 lb 11.9 oz)   05/01/24 102.6 kg (226 lb 3.1 oz)   04/01/24 104.4 kg (230 lb 2.6 oz)   09/27/23 102.6 kg (226 lb 3.1  oz)     General appearance: alert and cooperative, not in acute distress  Head: normocephalic, without obvious abnormality, atraumatic  Eyes: conjunctivae/corneas clear. PERRL, EOM's intact.  Ears: clear tympanic membranes   Neck: no adenopathy, supple, symmetrical, trachea midline and thyroid not enlarged, symmetric, no tenderness/mass/nodules, no JVD  Throat: lips, mucosa, and tongue normal; teeth and gums normal; no thrush  Chest: no reproducible chest pain   Heart: regular rate and rhythm, S1, S2 normal, no murmur, click, rub or gallop  Lungs: unlabored respiration, bilateral equal air entry, normal vesicular breath sound heard, no wheezing, rhonchi   Abdomen: soft, non-tender, non-distended; bowel sounds +; no masses,  no organomegaly, no ascites   Extremities: normal, atraumatic, no cyanosis or edema noted B/L upper and lower extremities.  Skin: skin color, texture, turgor normal. No rashes or lesions noted.  Neurologic: grossly intact      Lab:  Lab Results   Component Value Date    WBC 5.92 07/22/2024    HGB 12.9 07/22/2024    HCT 38.8 07/22/2024    MCV 81 (L) 07/22/2024     07/22/2024     07/22/2024    K 4.0 07/22/2024     07/22/2024    CO2 25 07/22/2024    BUN 6 07/22/2024    GLU 89 07/22/2024    CALCIUM 9.6 07/22/2024    MG 2.1 09/05/2018    PHOS 2.9 09/05/2018    PHOS 2.9 09/05/2018    AST 19 07/22/2024    ALT 15 07/22/2024    CHOL 153 07/22/2024    HDL 41 07/22/2024    LDLCALC 98.4 07/22/2024    TRIG 68 07/22/2024    TSH 1.007 09/27/2023       Impression:    ICD-10-CM ICD-9-CM    1. Toe pain, left  M79.675 729.5 Ambulatory referral/consult to Podiatry      2. Gingivitis  K05.10 523.10 chlorhexidine (PERIDEX) 0.12 % solution      3. Vitamin D deficiency  E55.9 268.9 ergocalciferol (ERGOCALCIFEROL) 50,000 unit Cap      1. Gingivitis  - chlorhexidine (PERIDEX) 0.12 % solution; Use as directed 15 mLs in the mouth or throat 2 (two) times daily.  Dispense: 473 mL; Refill: 2    2. Vitamin D  deficiency  - ergocalciferol (ERGOCALCIFEROL) 50,000 unit Cap; Take 1 capsule (50,000 Units total) by mouth every 7 days.  Dispense: 12 capsule; Refill: 1    3. Toe pain, left  - Ambulatory referral/consult to Podiatry; Future      Future Appointments   Date Time Provider Department Center   8/20/2024 11:00 AM [facility] [place]   10/31/2024  7:00 AM [facility]       I spent a total of 25 minutes on the day of the visit.This includes face to face time and non-face to face time preparing to see the patient (eg, review of tests), obtaining and/or reviewing separately obtained history, documenting clinical information in the electronic or other health record, independently interpreting results and communicating results to the patient/family/caregiver, or care coordinator.  Visit today included increased complexity associated with the care of the episodic problem   addressed and managing the longitudinal care of the patient due to the serious and/or complex managed problem(s) .     Tom Yeung MD

## 2024-08-28 ENCOUNTER — PATIENT MESSAGE (OUTPATIENT)
Dept: INTERNAL MEDICINE | Facility: CLINIC | Age: 54
End: 2024-08-28
Payer: MEDICAID

## 2024-08-28 DIAGNOSIS — H10.12 ACUTE ATOPIC CONJUNCTIVITIS OF LEFT EYE: Primary | ICD-10-CM

## 2024-08-28 RX ORDER — AZELASTINE HYDROCHLORIDE 0.5 MG/ML
1 SOLUTION/ DROPS OPHTHALMIC 2 TIMES DAILY
Qty: 6 ML | Refills: 6 | Status: SHIPPED | OUTPATIENT
Start: 2024-08-28 | End: 2025-08-28

## 2024-09-17 ENCOUNTER — OFFICE VISIT (OUTPATIENT)
Dept: INTERNAL MEDICINE | Facility: CLINIC | Age: 54
End: 2024-09-17
Payer: MEDICAID

## 2024-09-17 ENCOUNTER — LAB VISIT (OUTPATIENT)
Dept: LAB | Facility: HOSPITAL | Age: 54
End: 2024-09-17
Attending: STUDENT IN AN ORGANIZED HEALTH CARE EDUCATION/TRAINING PROGRAM
Payer: MEDICAID

## 2024-09-17 VITALS
HEART RATE: 88 BPM | BODY MASS INDEX: 31.41 KG/M2 | HEIGHT: 68 IN | OXYGEN SATURATION: 98 % | SYSTOLIC BLOOD PRESSURE: 122 MMHG | TEMPERATURE: 99 F | WEIGHT: 207.25 LBS | DIASTOLIC BLOOD PRESSURE: 72 MMHG

## 2024-09-17 DIAGNOSIS — L60.0 INGROWN NAIL OF GREAT TOE: Primary | ICD-10-CM

## 2024-09-17 DIAGNOSIS — E11.9 TYPE 2 DIABETES MELLITUS WITHOUT COMPLICATION, WITHOUT LONG-TERM CURRENT USE OF INSULIN: ICD-10-CM

## 2024-09-17 DIAGNOSIS — E78.49 OTHER HYPERLIPIDEMIA: ICD-10-CM

## 2024-09-17 DIAGNOSIS — I10 ESSENTIAL HYPERTENSION: ICD-10-CM

## 2024-09-17 DIAGNOSIS — E66.09 CLASS 1 OBESITY DUE TO EXCESS CALORIES WITH SERIOUS COMORBIDITY AND BODY MASS INDEX (BMI) OF 34.0 TO 34.9 IN ADULT: ICD-10-CM

## 2024-09-17 LAB
ALBUMIN SERPL BCP-MCNC: 3.8 G/DL (ref 3.5–5.2)
ALP SERPL-CCNC: 79 U/L (ref 55–135)
ALT SERPL W/O P-5'-P-CCNC: 16 U/L (ref 10–44)
ANION GAP SERPL CALC-SCNC: 9 MMOL/L (ref 8–16)
AST SERPL-CCNC: 19 U/L (ref 10–40)
BILIRUB SERPL-MCNC: 0.4 MG/DL (ref 0.1–1)
BUN SERPL-MCNC: 7 MG/DL (ref 6–20)
CALCIUM SERPL-MCNC: 9.5 MG/DL (ref 8.7–10.5)
CHLORIDE SERPL-SCNC: 106 MMOL/L (ref 95–110)
CO2 SERPL-SCNC: 29 MMOL/L (ref 23–29)
CREAT SERPL-MCNC: 0.8 MG/DL (ref 0.5–1.4)
EST. GFR  (NO RACE VARIABLE): >60 ML/MIN/1.73 M^2
ESTIMATED AVG GLUCOSE: 100 MG/DL (ref 68–131)
GLUCOSE SERPL-MCNC: 91 MG/DL (ref 70–110)
HBA1C MFR BLD: 5.1 % (ref 4–5.6)
POTASSIUM SERPL-SCNC: 4.3 MMOL/L (ref 3.5–5.1)
PROT SERPL-MCNC: 7.8 G/DL (ref 6–8.4)
SODIUM SERPL-SCNC: 144 MMOL/L (ref 136–145)

## 2024-09-17 PROCEDURE — 3044F HG A1C LEVEL LT 7.0%: CPT | Mod: CPTII,,, | Performed by: STUDENT IN AN ORGANIZED HEALTH CARE EDUCATION/TRAINING PROGRAM

## 2024-09-17 PROCEDURE — G2211 COMPLEX E/M VISIT ADD ON: HCPCS | Mod: S$PBB,,, | Performed by: STUDENT IN AN ORGANIZED HEALTH CARE EDUCATION/TRAINING PROGRAM

## 2024-09-17 PROCEDURE — 3078F DIAST BP <80 MM HG: CPT | Mod: CPTII,,, | Performed by: STUDENT IN AN ORGANIZED HEALTH CARE EDUCATION/TRAINING PROGRAM

## 2024-09-17 PROCEDURE — 99999 PR PBB SHADOW E&M-EST. PATIENT-LVL V: CPT | Mod: PBBFAC,,, | Performed by: STUDENT IN AN ORGANIZED HEALTH CARE EDUCATION/TRAINING PROGRAM

## 2024-09-17 PROCEDURE — 99214 OFFICE O/P EST MOD 30 MIN: CPT | Mod: S$PBB,,, | Performed by: STUDENT IN AN ORGANIZED HEALTH CARE EDUCATION/TRAINING PROGRAM

## 2024-09-17 PROCEDURE — 1159F MED LIST DOCD IN RCRD: CPT | Mod: CPTII,,, | Performed by: STUDENT IN AN ORGANIZED HEALTH CARE EDUCATION/TRAINING PROGRAM

## 2024-09-17 PROCEDURE — 80053 COMPREHEN METABOLIC PANEL: CPT | Mod: PO | Performed by: STUDENT IN AN ORGANIZED HEALTH CARE EDUCATION/TRAINING PROGRAM

## 2024-09-17 PROCEDURE — 99215 OFFICE O/P EST HI 40 MIN: CPT | Mod: PBBFAC,PO | Performed by: STUDENT IN AN ORGANIZED HEALTH CARE EDUCATION/TRAINING PROGRAM

## 2024-09-17 PROCEDURE — 3008F BODY MASS INDEX DOCD: CPT | Mod: CPTII,,, | Performed by: STUDENT IN AN ORGANIZED HEALTH CARE EDUCATION/TRAINING PROGRAM

## 2024-09-17 PROCEDURE — 83036 HEMOGLOBIN GLYCOSYLATED A1C: CPT | Performed by: STUDENT IN AN ORGANIZED HEALTH CARE EDUCATION/TRAINING PROGRAM

## 2024-09-17 PROCEDURE — 4010F ACE/ARB THERAPY RXD/TAKEN: CPT | Mod: CPTII,,, | Performed by: STUDENT IN AN ORGANIZED HEALTH CARE EDUCATION/TRAINING PROGRAM

## 2024-09-17 PROCEDURE — 1160F RVW MEDS BY RX/DR IN RCRD: CPT | Mod: CPTII,,, | Performed by: STUDENT IN AN ORGANIZED HEALTH CARE EDUCATION/TRAINING PROGRAM

## 2024-09-17 PROCEDURE — 3074F SYST BP LT 130 MM HG: CPT | Mod: CPTII,,, | Performed by: STUDENT IN AN ORGANIZED HEALTH CARE EDUCATION/TRAINING PROGRAM

## 2024-09-17 NOTE — PROGRESS NOTES
Chief Complaint   Patient presents with    Nail Problem     HPI: Marcie Geiger is a 53 y.o. female  with Pmhx listed below who presents to clinic for    History of Present Illness    CHIEF COMPLAINT:  The patient presents with concerns about her toenails, specifically regarding potential infection and incomplete healing after previous self-treatment.    HPI:  Ms. Geiger reports issues with her toenails following a self-treatment attempt 2-3 weeks ago, when she clipped off parts of two toenails at home after consulting a podiatrist. She describes incomplete healing or persistent abnormality in one toenail area, with a dark area on the nail that a previous doctor had described as old blood. The patient notes darkening of the skin around the affected area but denies pain when pressed and any fever. She expresses frustration with the slow healing process and is unsure if further intervention is needed, such as incision and drainage or removal of the remaining toenail. The patient had previously consulted a podiatrist who advised that the nail was not infected and would heal on its own, but she is now seeking a second opinion due to persistent concerns.    The patient denies abdominal pain, burping, burning in the stomach, loose stools, or blood in the stool.           Problem List:  Patient Active Problem List   Diagnosis    Type 2 diabetes mellitus without complication, without long-term current use of insulin    Gastroesophageal reflux disease without esophagitis    Atopic dermatitis    Essential hypertension    Class 1 obesity due to excess calories with serious comorbidity and body mass index (BMI) of 34.0 to 34.9 in adult    Vitamin D deficiency       ROS: Negative except as noted above.       Current Meds:  Current Outpatient Medications   Medication Sig Dispense Refill    atorvastatin (LIPITOR) 20 MG tablet Take 1 tablet (20 mg total) by mouth once daily. 90 tablet 3    azelastine (OPTIVAR) 0.05 %  ophthalmic solution Place 1 drop into both eyes 2 (two) times daily. 6 mL 6    blood pressure monitor (BLOOD PRESSURE KIT) Kit 1 kit by Misc.(Non-Drug; Combo Route) route once daily. 1 each 0    blood-glucose meter (TRUE METRIX GLUCOSE METER) Misc USE TWO TIMES A DAY 1 each 0    cetirizine (ZYRTEC) 10 MG tablet Take 1 tablet (10 mg total) by mouth once daily. 30 tablet 2    chlorhexidine (PERIDEX) 0.12 % solution Use as directed 15 mLs in the mouth or throat 2 (two) times daily. 118 mL 0    chlorhexidine (PERIDEX) 0.12 % solution Use as directed 15 mLs in the mouth or throat 2 (two) times daily. 473 mL 2    ergocalciferol (ERGOCALCIFEROL) 50,000 unit Cap Take 1 capsule (50,000 Units total) by mouth every 7 days. 12 capsule 1    hydrocortisone 1 % cream Apply twice a day 26 g 0    lancets (ONETOUCH DELICA PLUS LANCET) 30 gauge Misc USE AS DIRECTED 100 each 11    NASCOBAL 500 mcg/spray nasal spray 500 mcg by Nasal route every 7 days. 25 each 0    olmesartan (BENICAR) 20 MG tablet Take 1 tablet (20 mg total) by mouth once daily. 90 tablet 1    omeprazole (PRILOSEC) 20 MG capsule Take 1 capsule (20 mg total) by mouth once daily. 30 capsule 5    RESTASIS 0.05 % ophthalmic emulsion Place 1 drop into both eyes 2 (two) times daily. 60 each 2    semaglutide (OZEMPIC) 2 mg/dose (8 mg/3 mL) PnIj Inject 2 mg into the skin every 7 days. 3 mL 5    TRUE METRIX GLUCOSE TEST STRIP Strp USE TO TEST TWICE DAILY. 100 strip 11    TRUEPLUS LANCETS 33 gauge Misc Apply topically 2 (two) times daily.       No current facility-administered medications for this visit.      PE:  BP: 122/72  Pulse: 88     Temp: 99 °F (37.2 °C)  Weight: 94 kg (207 lb 3.7 oz) Body mass index is 31.51 kg/m².    Wt Readings from Last 5 Encounters:   09/17/24 94 kg (207 lb 3.7 oz)   08/20/24 94.9 kg (209 lb 3.5 oz)   07/22/24 96.5 kg (212 lb 11.9 oz)   05/01/24 102.6 kg (226 lb 3.1 oz)   04/01/24 104.4 kg (230 lb 2.6 oz)     General appearance: alert and  cooperative, not in acute distress  Head: normocephalic, without obvious abnormality, atraumatic  Eyes: conjunctivae/corneas clear. PERRL, EOM's intact.  Ears: clear tympanic membranes   Neck: no adenopathy, supple, symmetrical, trachea midline and thyroid not enlarged, symmetric, no tenderness/mass/nodules, no JVD  Throat: lips, mucosa, and tongue normal; teeth and gums normal; no thrush  Chest: no reproducible chest pain   Heart: regular rate and rhythm, S1, S2 normal, no murmur, click, rub or gallop  Lungs: unlabored respiration, bilateral equal air entry, normal vesicular breath sound heard, no wheezing, rhonchi   Abdomen: soft, non-tender, non-distended; bowel sounds +; no masses,  no organomegaly, no ascites   Extremities: normal, atraumatic, no cyanosis or edema noted B/L upper and lower extremities.  Skin: skin color, texture, turgor normal. No rashes or lesions noted.  Neurologic: grossly intact      Lab:  Lab Results   Component Value Date    WBC 5.92 07/22/2024    HGB 12.9 07/22/2024    HCT 38.8 07/22/2024    MCV 81 (L) 07/22/2024     07/22/2024     07/22/2024    K 4.0 07/22/2024     07/22/2024    CO2 25 07/22/2024    BUN 6 07/22/2024    GLU 89 07/22/2024    CALCIUM 9.6 07/22/2024    MG 2.1 09/05/2018    PHOS 2.9 09/05/2018    PHOS 2.9 09/05/2018    AST 19 07/22/2024    ALT 15 07/22/2024    CHOL 153 07/22/2024    HDL 41 07/22/2024    LDLCALC 98.4 07/22/2024    TRIG 68 07/22/2024    TSH 1.007 09/27/2023       Impression:    ICD-10-CM ICD-9-CM    1. Ingrown nail of great toe  L60.0 703.0 Ambulatory referral/consult to Podiatry      2. Type 2 diabetes mellitus without complication, without long-term current use of insulin  E11.9 250.00 Microalbumin/creatinine urine ratio      COMPREHENSIVE METABOLIC PANEL      HEMOGLOBIN A1C          Assessment & Plan    TOENAIL INJURY:  - Assessed patient's toenail injury, determining it is not infected but rather an old injury.  - Concluded antibiotics  are not necessary for the current condition.  - Considered referral to podiatrist for further management of toenail issue.  - Described signs of infection (redness, warmth, tenderness, pus) to differentiate from normal healing process.  - Referred to podiatrist for further management of toenail issue.    PREVENTIVE CARE:  - Reviewed patient's preventive care needs, including mammogram and vaccinations.    PATIENT EDUCATION:  - Explained the risks of unnecessary antibiotic use, including disruption of gut tyesha and potential for antibiotic resistance.  - Clarified misconceptions about antibiotic indications, emphasizing they are not appropriate for all conditions.    PODIATRY REFERRAL:  - Ms. Geiger to contact insurance to find in-network podiatrists.    FLU VACCINATION:  - Ms. Geiger to get flu vaccine at the office when available (expected within 1-2 weeks).    COVID VACCINATION:  - Recommend getting COVID vaccine at [facility].    LABS:  - Ordered lab work.    FOLLOW UP:  - Ms. Geiger to obtain records from upcoming eye exam and have them faxed to the office.  - Follow up on October 31st at 7:00 AM as scheduled.  - Follow up for mammogram on September 28th (to be confirmed with staff).        1. Ingrown nail of great toe  - Ambulatory referral/consult to Podiatry; Future    2. Type 2 diabetes mellitus without complication, without long-term current use of insulin   Latest Reference Range & Units 03/18/22 09:01 09/21/22 09:18 03/27/23 09:35 09/27/23 09:12 04/01/24 09:18 07/22/24 08:04   Hemoglobin A1C External 4.0 - 5.6 % 5.9 (H) 5.8 (H) 5.4 5.2 5.4 5.2   Estimated Avg Glucose 68 - 131 mg/dL 123 120 108 103 108 103   (H): Data is abnormally high  - Microalbumin/creatinine urine ratio; Future  - COMPREHENSIVE METABOLIC PANEL; Future  - HEMOGLOBIN A1C; Future  On ozempic to be continued  Medication to be adjusted on the basis of lab work  Repeat lab today      3. Class 1 obesity due to excess calories with serious  comorbidity and body mass index (BMI) of 34.0 to 34.9 in adult  Continue ozempic      4. Essential hypertension  Normotensive today  Low salt diet.  Enouraged to increase in physical activity at least 30 minutes of brisk walking/day , 5 days a week  Advised weight loss    Advised for DASH diet - The Dietary Approaches to Stop Hypertension (DASH) is high in vegetables, fruits, low-fat dairy products, whole grains, poultry, fish, and nuts and low in sweets, sugar-sweetened beverages, and red meats   Stop smoking.  Limit caffeine intake  Limit alcohol intake <3/day for men and <2/day for women.  Advised to be compliant with medication.  Please monitor your blood pressure regularly at home   Return precaution provided  On benicar to be continued.    5. Other hyperlipidemia  On Lipitor to be continued.      Future Appointments   Date Time Provider Department Center   9/17/2024  9:40 AM [facility] [place]       I spent a total of 32 minutes on the day of the visit.This includes face to face time and non-face to face time preparing to see the patient (eg, review of tests), obtaining and/or reviewing separately obtained history, documenting clinical information in the electronic or other health record, independently interpreting results and communicating results to the patient/family/caregiver, or care coordinator.  Visit today included increased complexity associated with the care of the episodic problem   addressed and managing the longitudinal care of the patient due to the serious and/or complex managed problem(s) .     Tom Yeung MD    This note was generated with the assistance of ambient listening technology. Verbal consent was obtained by the patient and accompanying visitor(s) for the recording of patient appointment to facilitate this note. I attest to having reviewed and edited the generated note for accuracy, though some syntax or spelling errors may persist. Please contact the author of  this note for any clarification.

## 2024-09-24 ENCOUNTER — TELEPHONE (OUTPATIENT)
Dept: INTERNAL MEDICINE | Facility: CLINIC | Age: 54
End: 2024-09-24
Payer: MEDICAID

## 2024-09-24 NOTE — TELEPHONE ENCOUNTER
Pt called back no answer, left message to call us back and that mammo had been rescheduled on 10/1/24 as requested.

## 2024-10-01 ENCOUNTER — HOSPITAL ENCOUNTER (OUTPATIENT)
Dept: RADIOLOGY | Facility: HOSPITAL | Age: 54
Discharge: HOME | End: 2024-10-01
Attending: STUDENT IN AN ORGANIZED HEALTH CARE EDUCATION/TRAINING PROGRAM
Payer: MEDICAID

## 2024-10-01 VITALS — BODY MASS INDEX: 31.37 KG/M2 | HEIGHT: 68 IN | WEIGHT: 207 LBS

## 2024-10-01 DIAGNOSIS — Z12.31 SCREENING MAMMOGRAM FOR BREAST CANCER: ICD-10-CM

## 2024-10-01 PROCEDURE — 77067 SCR MAMMO BI INCL CAD: CPT | Mod: TC,PO

## 2024-10-01 PROCEDURE — 77063 BREAST TOMOSYNTHESIS BI: CPT | Mod: 26,,, | Performed by: RADIOLOGY

## 2024-10-01 PROCEDURE — 77067 SCR MAMMO BI INCL CAD: CPT | Mod: 26,,, | Performed by: RADIOLOGY

## 2024-10-28 DIAGNOSIS — E55.9 VITAMIN D DEFICIENCY: ICD-10-CM

## 2024-10-28 RX ORDER — ERGOCALCIFEROL 1.25 MG/1
50000 CAPSULE ORAL
Qty: 12 CAPSULE | Refills: 1 | Status: SHIPPED | OUTPATIENT
Start: 2024-10-28 | End: 2024-10-31 | Stop reason: SDUPTHER

## 2024-10-31 ENCOUNTER — PATIENT MESSAGE (OUTPATIENT)
Dept: INTERNAL MEDICINE | Facility: CLINIC | Age: 54
End: 2024-10-31
Payer: MEDICAID

## 2024-10-31 DIAGNOSIS — E55.9 VITAMIN D DEFICIENCY: ICD-10-CM

## 2024-10-31 RX ORDER — ERGOCALCIFEROL 1.25 MG/1
50000 CAPSULE ORAL
Qty: 12 CAPSULE | Refills: 3 | Status: SHIPPED | OUTPATIENT
Start: 2024-10-31 | End: 2025-10-31

## 2024-11-20 ENCOUNTER — PATIENT MESSAGE (OUTPATIENT)
Dept: INTERNAL MEDICINE | Facility: CLINIC | Age: 54
End: 2024-11-20
Payer: MEDICAID

## 2024-11-20 DIAGNOSIS — K05.10 GINGIVITIS: ICD-10-CM

## 2024-11-20 RX ORDER — CHLORHEXIDINE GLUCONATE ORAL RINSE 1.2 MG/ML
SOLUTION DENTAL
Qty: 473 ML | Refills: 1 | Status: SHIPPED | OUTPATIENT
Start: 2024-11-20

## 2024-11-20 NOTE — TELEPHONE ENCOUNTER
Refill Routing Note   Medication(s) are not appropriate for processing by [facility] for the following reason(s):        Outside of protocol    [facility] action(s):  Route               Appointments  past 12m or future 3m with PCP    Date Provider   Last Visit   9/17/2024 Tom Yeung MD   Next Visit   3/17/2025 Tom Yeung MD   ED visits in past 90 days: 0        Note composed:12:14 PM 11/20/2024

## 2025-01-04 DIAGNOSIS — E11.9 TYPE 2 DIABETES MELLITUS WITHOUT COMPLICATION, WITHOUT LONG-TERM CURRENT USE OF INSULIN: ICD-10-CM

## 2025-01-04 NOTE — TELEPHONE ENCOUNTER
Care Due:                  Date            Visit Type   Department     Provider  --------------------------------------------------------------------------------                                EP -                              PRIMARY      [facility] INTERNAL  Last Visit: 09-      CARE ([facility])   MEDICINE       Tom  Anjana                              EP -                              PRIMARY      [facility] INTERNAL  Next Visit: 03-      CARE ([facility])   MEDICINE       [facility]  Anjana                                                            Last  Test          Frequency    Reason                     Performed    Due Date  --------------------------------------------------------------------------------    HBA1C.......  6 months...  semaglutide..............  09- 03-    Vitamin D...  12 months..  ergocalciferol...........  Not Found    Overdue    [employer] Embedded Care Due Messages. Reference number: [id number].   1/04/2025 4:13:24 AM CST

## 2025-01-05 RX ORDER — SEMAGLUTIDE 2.68 MG/ML
2 INJECTION, SOLUTION SUBCUTANEOUS
Qty: 9 ML | Refills: 0 | Status: SHIPPED | OUTPATIENT
Start: 2025-01-05

## 2025-01-05 NOTE — TELEPHONE ENCOUNTER
Provider Staff:  Action required for this patient     Please see care gap opportunities below in Care Due Message.    Thanks!  [facility]     Appointments      Date Provider   Last Visit   9/17/2024 Tom Yeung MD   Next Visit   3/17/2025 Tom Yeung MD      Refill Decision Note   Marcie Geiger  is requesting a refill authorization.  Brief Assessment and Rationale for Refill:  Approve     Medication Therapy Plan:         Comments:     Note composed:2:03 PM 01/05/2025

## 2025-01-13 ENCOUNTER — TELEPHONE (OUTPATIENT)
Dept: INTERNAL MEDICINE | Facility: CLINIC | Age: 55
End: 2025-01-13
Payer: MEDICAID

## 2025-01-13 NOTE — TELEPHONE ENCOUNTER
Called and spoke to pt who was requesting refill. Informed her that her ozempic was denied because she no longer qualifies because her A1C is down to 5.1. Pt voiced understanding.

## 2025-01-16 ENCOUNTER — OFFICE VISIT (OUTPATIENT)
Dept: INTERNAL MEDICINE | Facility: CLINIC | Age: 55
End: 2025-01-16
Payer: MEDICAID

## 2025-01-16 ENCOUNTER — LAB VISIT (OUTPATIENT)
Dept: LAB | Facility: HOSPITAL | Age: 55
End: 2025-01-16
Attending: STUDENT IN AN ORGANIZED HEALTH CARE EDUCATION/TRAINING PROGRAM
Payer: MEDICAID

## 2025-01-16 VITALS
OXYGEN SATURATION: 98 % | DIASTOLIC BLOOD PRESSURE: 88 MMHG | HEART RATE: 85 BPM | SYSTOLIC BLOOD PRESSURE: 140 MMHG | TEMPERATURE: 98 F | HEIGHT: 68 IN | BODY MASS INDEX: 30.71 KG/M2 | WEIGHT: 202.63 LBS

## 2025-01-16 DIAGNOSIS — Z90.3 H/O LAPAROSCOPIC PARTIAL GASTRECTOMY: ICD-10-CM

## 2025-01-16 DIAGNOSIS — E66.09 CLASS 1 OBESITY DUE TO EXCESS CALORIES WITH SERIOUS COMORBIDITY AND BODY MASS INDEX (BMI) OF 34.0 TO 34.9 IN ADULT: ICD-10-CM

## 2025-01-16 DIAGNOSIS — E11.9 TYPE 2 DIABETES MELLITUS WITHOUT COMPLICATION, WITHOUT LONG-TERM CURRENT USE OF INSULIN: ICD-10-CM

## 2025-01-16 DIAGNOSIS — I10 ESSENTIAL HYPERTENSION: ICD-10-CM

## 2025-01-16 DIAGNOSIS — E78.49 OTHER HYPERLIPIDEMIA: ICD-10-CM

## 2025-01-16 DIAGNOSIS — E66.811 CLASS 1 OBESITY DUE TO EXCESS CALORIES WITH SERIOUS COMORBIDITY AND BODY MASS INDEX (BMI) OF 34.0 TO 34.9 IN ADULT: ICD-10-CM

## 2025-01-16 DIAGNOSIS — E11.9 TYPE 2 DIABETES MELLITUS WITHOUT COMPLICATION, WITHOUT LONG-TERM CURRENT USE OF INSULIN: Primary | ICD-10-CM

## 2025-01-16 LAB
ALBUMIN SERPL BCP-MCNC: 3.9 G/DL (ref 3.5–5.2)
ALP SERPL-CCNC: 76 U/L (ref 40–150)
ALT SERPL W/O P-5'-P-CCNC: 13 U/L (ref 10–44)
ANION GAP SERPL CALC-SCNC: 9 MMOL/L (ref 8–16)
AST SERPL-CCNC: 18 U/L (ref 10–40)
BILIRUB SERPL-MCNC: 0.5 MG/DL (ref 0.1–1)
BUN SERPL-MCNC: 7 MG/DL (ref 6–20)
CALCIUM SERPL-MCNC: 9.3 MG/DL (ref 8.7–10.5)
CHLORIDE SERPL-SCNC: 107 MMOL/L (ref 95–110)
CO2 SERPL-SCNC: 28 MMOL/L (ref 23–29)
CREAT SERPL-MCNC: 0.7 MG/DL (ref 0.5–1.4)
EST. GFR  (NO RACE VARIABLE): >60 ML/MIN/1.73 M^2
ESTIMATED AVG GLUCOSE: 97 MG/DL (ref 68–131)
GLUCOSE SERPL-MCNC: 89 MG/DL (ref 70–110)
HBA1C MFR BLD: 5 % (ref 4–5.6)
POTASSIUM SERPL-SCNC: 3.8 MMOL/L (ref 3.5–5.1)
PROT SERPL-MCNC: 8 G/DL (ref 6–8.4)
SODIUM SERPL-SCNC: 144 MMOL/L (ref 136–145)

## 2025-01-16 PROCEDURE — 99214 OFFICE O/P EST MOD 30 MIN: CPT | Mod: S$PBB,,, | Performed by: STUDENT IN AN ORGANIZED HEALTH CARE EDUCATION/TRAINING PROGRAM

## 2025-01-16 PROCEDURE — 99999 PR PBB SHADOW E&M-EST. PATIENT-LVL V: CPT | Mod: PBBFAC,,, | Performed by: STUDENT IN AN ORGANIZED HEALTH CARE EDUCATION/TRAINING PROGRAM

## 2025-01-16 PROCEDURE — 3077F SYST BP >= 140 MM HG: CPT | Mod: CPTII,,, | Performed by: STUDENT IN AN ORGANIZED HEALTH CARE EDUCATION/TRAINING PROGRAM

## 2025-01-16 PROCEDURE — 3079F DIAST BP 80-89 MM HG: CPT | Mod: CPTII,,, | Performed by: STUDENT IN AN ORGANIZED HEALTH CARE EDUCATION/TRAINING PROGRAM

## 2025-01-16 PROCEDURE — 83036 HEMOGLOBIN GLYCOSYLATED A1C: CPT | Performed by: STUDENT IN AN ORGANIZED HEALTH CARE EDUCATION/TRAINING PROGRAM

## 2025-01-16 PROCEDURE — 1159F MED LIST DOCD IN RCRD: CPT | Mod: CPTII,,, | Performed by: STUDENT IN AN ORGANIZED HEALTH CARE EDUCATION/TRAINING PROGRAM

## 2025-01-16 PROCEDURE — 1160F RVW MEDS BY RX/DR IN RCRD: CPT | Mod: CPTII,,, | Performed by: STUDENT IN AN ORGANIZED HEALTH CARE EDUCATION/TRAINING PROGRAM

## 2025-01-16 PROCEDURE — 3008F BODY MASS INDEX DOCD: CPT | Mod: CPTII,,, | Performed by: STUDENT IN AN ORGANIZED HEALTH CARE EDUCATION/TRAINING PROGRAM

## 2025-01-16 PROCEDURE — 80053 COMPREHEN METABOLIC PANEL: CPT | Mod: PO | Performed by: STUDENT IN AN ORGANIZED HEALTH CARE EDUCATION/TRAINING PROGRAM

## 2025-01-16 PROCEDURE — G2211 COMPLEX E/M VISIT ADD ON: HCPCS | Mod: S$PBB,,, | Performed by: STUDENT IN AN ORGANIZED HEALTH CARE EDUCATION/TRAINING PROGRAM

## 2025-01-16 PROCEDURE — 36415 COLL VENOUS BLD VENIPUNCTURE: CPT | Mod: PO | Performed by: STUDENT IN AN ORGANIZED HEALTH CARE EDUCATION/TRAINING PROGRAM

## 2025-01-16 PROCEDURE — 99215 OFFICE O/P EST HI 40 MIN: CPT | Mod: PBBFAC,PO | Performed by: STUDENT IN AN ORGANIZED HEALTH CARE EDUCATION/TRAINING PROGRAM

## 2025-01-16 RX ORDER — SEMAGLUTIDE 1 MG/.5ML
1 INJECTION, SOLUTION SUBCUTANEOUS WEEKLY
Qty: 2 ML | Refills: 0 | Status: SHIPPED | OUTPATIENT
Start: 2025-03-19 | End: 2025-04-18

## 2025-01-16 RX ORDER — SEMAGLUTIDE 0.5 MG/.5ML
0.5 INJECTION, SOLUTION SUBCUTANEOUS WEEKLY
Qty: 2 ML | Refills: 0 | Status: SHIPPED | OUTPATIENT
Start: 2025-02-16 | End: 2025-03-18

## 2025-01-16 RX ORDER — SEMAGLUTIDE 0.25 MG/.5ML
0.25 INJECTION, SOLUTION SUBCUTANEOUS WEEKLY
Qty: 2 ML | Refills: 0 | Status: SHIPPED | OUTPATIENT
Start: 2025-01-16 | End: 2025-02-15

## 2025-01-16 RX ORDER — SEMAGLUTIDE 1.7 MG/.75ML
1.7 INJECTION, SOLUTION SUBCUTANEOUS WEEKLY
Qty: 3 ML | Refills: 0 | Status: SHIPPED | OUTPATIENT
Start: 2025-04-19 | End: 2025-05-19

## 2025-01-16 RX ORDER — SEMAGLUTIDE 2.4 MG/.75ML
2.4 INJECTION, SOLUTION SUBCUTANEOUS WEEKLY
Qty: 3 ML | Refills: 0 | Status: SHIPPED | OUTPATIENT
Start: 2025-05-20 | End: 2025-06-19

## 2025-01-16 NOTE — PROGRESS NOTES
Chief Complaint   Patient presents with    Weight Loss     HPI: Marcie Geiger is a 54 y.o. female  with Pmhx listed below who presents to clinic for    History of Present Illness    CHIEF COMPLAINT:  - Ms. Geiger presents with multiple concerns including medication change for weight management, allergy-like symptoms, and a toe injury.    HPI:  Ms. Geiger expresses concern about discontinuing Ozempic, which has been effective for weight loss and appetite control. She is seeking alternatives to maintain her progress, mentioning a history of bariatric surgery approximately 3 years ago.    Ms. Geiger reports allergy-like symptoms that began Friday night and were noticed upon waking Saturday morning. Symptoms include an itchy throat, runny eyes, sneezing, and a low-grade fever, which have improved slightly over time. She took Nyquil for 1 day to manage these symptoms. Ms. Geiger mentions being in close contact with a friend who had the flu, but does not believe she has contracted it herself.    Ms. Geiger reports an injury to her toe, which has turned black after dropping an object on it. The toe remains swollen, and she is concerned about potential trauma. She previously sought care at a clinic for this issue, but the toe was not properly examined. Ms. Geiger is now seeking a referral to a podiatrist for further evaluation.    Ms. Geiger also requests a face wash or cream, recalling a previous prescription for a liquid facial product that was effective for her skin condition.    Ms. Geiger denies severe coughing, shortness of breath, wheezing, vomiting, and diarrhea.           Problem List:  Patient Active Problem List   Diagnosis    Type 2 diabetes mellitus without complication, without long-term current use of insulin    Gastroesophageal reflux disease without esophagitis    Atopic dermatitis    Essential hypertension    Class 1 obesity due to excess calories with serious comorbidity and body mass index (BMI) of  34.0 to 34.9 in adult    Vitamin D deficiency       ROS: Negative except as noted above.       Current Meds:  Current Outpatient Medications   Medication Sig Dispense Refill    atorvastatin (LIPITOR) 20 MG tablet Take 1 tablet (20 mg total) by mouth once daily. 90 tablet 3    azelastine (OPTIVAR) 0.05 % ophthalmic solution Place 1 drop into both eyes 2 (two) times daily. 6 mL 6    blood pressure monitor (BLOOD PRESSURE KIT) Kit 1 kit by Misc.(Non-Drug; Combo Route) route once daily. 1 each 0    blood-glucose meter (TRUE METRIX GLUCOSE METER) Misc USE TWO TIMES A DAY 1 each 0    cetirizine (ZYRTEC) 10 MG tablet TAKE 1 TABLET BY MOUTH ONCE DAILY 90 tablet 3    chlorhexidine (PERIDEX) 0.12 % solution RINSE AND GARGLE 15 ML BY MOUTH OR THROAT TWICE DAILY AS DIRECTED 473 mL 1    ergocalciferol (ERGOCALCIFEROL) 50,000 unit Cap Take 1 capsule (50,000 Units total) by mouth every 7 days. 12 capsule 3    hydrocortisone 1 % cream Apply twice a day 26 g 0    lancets (ONETOUCH DELICA PLUS LANCET) 30 gauge Misc USE AS DIRECTED 100 each 11    NASCOBAL 500 mcg/spray nasal spray 500 mcg by Nasal route every 7 days. 25 each 0    olmesartan (BENICAR) 20 MG tablet Take 1 tablet (20 mg total) by mouth once daily. 90 tablet 1    omeprazole (PRILOSEC) 20 MG capsule Take 1 capsule (20 mg total) by mouth once daily. 30 capsule 5    RESTASIS 0.05 % ophthalmic emulsion Place 1 drop into both eyes 2 (two) times daily. 60 each 2    TRUE METRIX GLUCOSE TEST STRIP Strp USE TO TEST TWICE DAILY. 100 strip 11    TRUEPLUS LANCETS 33 gauge Misc Apply topically 2 (two) times daily.      semaglutide, weight loss, (WEGOVY) 0.25 mg/0.5 mL PnIj Inject 0.25 mg into the skin once a week. 2 mL 0    [START ON 2/16/2025] semaglutide, weight loss, (WEGOVY) 0.5 mg/0.5 mL PnIj Inject 0.5 mg into the skin once a week. 2 mL 0    [START ON 3/19/2025] semaglutide, weight loss, (WEGOVY) 1 mg/0.5 mL PnIj Inject 1 mg into the skin once a week. 2 mL 0    [START ON  4/19/2025] semaglutide, weight loss, (WEGOVY) 1.7 mg/0.75 mL PnIj Inject 1.7 mg into the skin once a week. 3 mL 0    [START ON 5/20/2025] semaglutide, weight loss, (WEGOVY) 2.4 mg/0.75 mL PnIj Inject 2.4 mg into the skin once a week. 3 mL 0    semaglutide, weight loss, 0.25 mg/0.5 mL PnIj Inject 0.25 mg into the skin every 7 days. 2 mL 0     No current facility-administered medications for this visit.      PE:  BP: (!) 140/88  Pulse: 85     Temp: 98.3 °F (36.8 °C)  Weight: 91.9 kg (202 lb 9.6 oz) Body mass index is 30.81 kg/m².    Wt Readings from Last 5 Encounters:   01/16/25 91.9 kg (202 lb 9.6 oz)   10/01/24 93.9 kg (207 lb)   09/17/24 94 kg (207 lb 3.7 oz)   08/20/24 94.9 kg (209 lb 3.5 oz)   07/22/24 96.5 kg (212 lb 11.9 oz)     General appearance: alert and cooperative, not in acute distress  Head: normocephalic, without obvious abnormality, atraumatic  Eyes: conjunctivae/corneas clear. PERRL, EOM's intact.  Ears: clear tympanic membranes   Neck: no adenopathy, supple, symmetrical, trachea midline and thyroid not enlarged, symmetric, no tenderness/mass/nodules, no JVD  Throat: lips, mucosa, and tongue normal; teeth and gums normal; no thrush  Chest: no reproducible chest pain   Heart: regular rate and rhythm, S1, S2 normal, no murmur, click, rub or gallop  Lungs: unlabored respiration, bilateral equal air entry, normal vesicular breath sound heard, no wheezing, rhonchi   Abdomen: soft, non-tender, non-distended; bowel sounds +; no masses,  no organomegaly, no ascites   Extremities: normal, atraumatic, no cyanosis or edema noted B/L upper and lower extremities.  Skin: skin color, texture, turgor normal. No rashes or lesions noted.  Neurologic: grossly intact      Lab:  Lab Results   Component Value Date    WBC 5.92 07/22/2024    HGB 12.9 07/22/2024    HCT 38.8 07/22/2024    MCV 81 (L) 07/22/2024     07/22/2024     01/16/2025    K 3.8 01/16/2025     01/16/2025    CO2 28 01/16/2025    BUN 7  01/16/2025    GLU 89 01/16/2025    CALCIUM 9.3 01/16/2025    MG 2.1 09/05/2018    PHOS 2.9 09/05/2018    PHOS 2.9 09/05/2018    AST 18 01/16/2025    ALT 13 01/16/2025    CHOL 153 07/22/2024    HDL 41 07/22/2024    LDLCALC 98.4 07/22/2024    TRIG 68 07/22/2024    TSH 1.007 09/27/2023       Impression:    ICD-10-CM ICD-9-CM    1. Type 2 diabetes mellitus without complication, without long-term current use of insulin  E11.9 250.00 semaglutide, weight loss, 0.25 mg/0.5 mL PnIj      Ambulatory referral/consult to Podiatry      semaglutide, weight loss, (WEGOVY) 0.25 mg/0.5 mL PnIj      semaglutide, weight loss, (WEGOVY) 0.5 mg/0.5 mL PnIj      semaglutide, weight loss, (WEGOVY) 1 mg/0.5 mL PnIj      semaglutide, weight loss, (WEGOVY) 1.7 mg/0.75 mL PnIj      semaglutide, weight loss, (WEGOVY) 2.4 mg/0.75 mL PnIj      HEMOGLOBIN A1C      COMPREHENSIVE METABOLIC PANEL      2. Class 1 obesity due to excess calories with serious comorbidity and body mass index (BMI) of 34.0 to 34.9 in adult  E66.811 278.00     E66.09 V85.34     Z68.34        3. Essential hypertension  I10 401.9       4. Other hyperlipidemia  E78.49 272.4       5. H/O laparoscopic partial gastrectomy  Z90.3 V15.29           Assessment & Plan    WEIGHT MANAGEMENT:  - Evaluated the patient's bariatric surgery history from approximately 3 years ago and current weight management needs.  - Considered alternative GLP-1 analog medications, such as wegovy, to replace Ozempic due to insurance coverage issues.  - Explained the process of prior authorization for GLP-1 analog medications, including the criteria of diabetes status and recent A1C levels.  - Discussed the similarity of GLP-1 analog medications as being from the same drug family.  - Instructed the patient to contact the insurance company to inquire about coverage for alternative GLP-1 analog medications.  - Advised the patient to report any concerns about weight control following Ozempic  discontinuation.    ALLERGY SYMPTOMS:  - Assessed possible allergy symptoms including pruritic pharynx, lacrimation, rhinorrhea, and low-grade pyrexia, which started Friday night and improved slightly over the weekend.  - Considered differential diagnoses including influenza exposure.  - Ruled out severe respiratory issues.  - Continued cetirizine daily for allergy symptoms.  - Noted the patient took Nyquil for 1 day to manage symptoms.    TOE INJURY:  - Noted the patient's concern about toe injury resulting from dropping an object, causing ecchymosis and edema.  - Acknowledged the need for further evaluation of toe injury, as previous exam at [facility] was inconclusive due to covered toe.  - Referred the patient to a podiatrist at the [facility] for evaluation of toe injury.  - Suggested referral to podiatry at [facility] as a potential alternative option.        1. Type 2 diabetes mellitus without complication, without long-term current use of insulin (Primary)   Latest Reference Range & Units 09/27/23 09:12 04/01/24 09:18 07/22/24 08:04 09/17/24 08:23   Hemoglobin A1C External 4.0 - 5.6 % 5.2 5.4 5.2 5.1   Estimated Avg Glucose 68 - 131 mg/dL 103 108 103 100     - semaglutide, weight loss, 0.25 mg/0.5 mL PnIj; Inject 0.25 mg into the skin every 7 days.  Dispense: 2 mL; Refill: 0  - Ambulatory referral/consult to Podiatry; Future  - semaglutide, weight loss, (WEGOVY) 0.25 mg/0.5 mL PnIj; Inject 0.25 mg into the skin once a week.  Dispense: 2 mL; Refill: 0  - semaglutide, weight loss, (WEGOVY) 0.5 mg/0.5 mL PnIj; Inject 0.5 mg into the skin once a week.  Dispense: 2 mL; Refill: 0  - semaglutide, weight loss, (WEGOVY) 1 mg/0.5 mL PnIj; Inject 1 mg into the skin once a week.  Dispense: 2 mL; Refill: 0  - semaglutide, weight loss, (WEGOVY) 1.7 mg/0.75 mL PnIj; Inject 1.7 mg into the skin once a week.  Dispense: 3 mL; Refill: 0  - semaglutide, weight loss, (WEGOVY) 2.4 mg/0.75 mL PnIj; Inject 2.4 mg into the skin once  a week.  Dispense: 3 mL; Refill: 0  - HEMOGLOBIN A1C; Future  - COMPREHENSIVE METABOLIC PANEL; Future    2. Class 1 obesity due to excess calories with serious comorbidity and body mass index (BMI) of 34.0 to 34.9 in adult  Restarting wegovy     3. Essential hypertension  Low salt diet.  Enouraged to increase in physical activity at least 30 minutes of brisk walking/day , 5 days a week  Advised weight loss    Advised for DASH diet - The Dietary Approaches to Stop Hypertension (DASH) is high in vegetables, fruits, low-fat dairy products, whole grains, poultry, fish, and nuts and low in sweets, sugar-sweetened beverages, and red meats   Stop smoking.  Limit caffeine intake  Limit alcohol intake <3/day for men and <2/day for women.  Advised to be compliant with medication.  Please monitor your blood pressure regularly at home   Return precaution provided  On losartan to be continued.    4. Other hyperlipidemia  On statin to be continued.    5. H/O laparoscopic partial gastrectomy  Nascobal spray to be continued.    Future Appointments   Date Time Provider Department Center   3/17/2025  7:00 AM [facility] spent a total of 32 minutes on the day of the visit.This includes face to face time and non-face to face time preparing to see the patient (eg, review of tests), obtaining and/or reviewing separately obtained history, documenting clinical information in the electronic or other health record, independently interpreting results and communicating results to the patient/family/caregiver, or care coordinator.  Visit today included increased complexity associated with the care of the episodic problem   addressed and managing the longitudinal care of the patient due to the serious and/or complex managed problem(s) .     Tom Yeung MD    This note was generated with the assistance of ambient listening technology. Verbal consent was obtained by the patient and accompanying visitor(s)  for the recording of patient appointment to facilitate this note. I attest to having reviewed and edited the generated note for accuracy, though some syntax or spelling errors may persist. Please contact the author of this note for any clarification.

## 2025-02-28 ENCOUNTER — HOSPITAL ENCOUNTER (EMERGENCY)
Facility: HOSPITAL | Age: 55
Discharge: HOME | End: 2025-02-28
Attending: EMERGENCY MEDICINE
Payer: MEDICAID

## 2025-02-28 VITALS
HEART RATE: 98 BPM | DIASTOLIC BLOOD PRESSURE: 60 MMHG | SYSTOLIC BLOOD PRESSURE: 123 MMHG | BODY MASS INDEX: 29.94 KG/M2 | TEMPERATURE: 98 F | HEIGHT: 68 IN | OXYGEN SATURATION: 100 % | WEIGHT: 197.56 LBS | RESPIRATION RATE: 16 BRPM

## 2025-02-28 DIAGNOSIS — R11.2 NAUSEA AND VOMITING, UNSPECIFIED VOMITING TYPE: ICD-10-CM

## 2025-02-28 DIAGNOSIS — N94.89 PELVIC CYST IN FEMALE: ICD-10-CM

## 2025-02-28 DIAGNOSIS — R10.13 EPIGASTRIC PAIN: ICD-10-CM

## 2025-02-28 DIAGNOSIS — N12 PYELONEPHRITIS: Primary | ICD-10-CM

## 2025-02-28 LAB
ALBUMIN SERPL BCP-MCNC: 3.1 G/DL (ref 3.5–5.2)
ALP SERPL-CCNC: 85 U/L (ref 40–150)
ALT SERPL W/O P-5'-P-CCNC: 14 U/L (ref 10–44)
ANION GAP SERPL CALC-SCNC: 10 MMOL/L (ref 8–16)
AST SERPL-CCNC: 12 U/L (ref 10–40)
BACTERIA #/AREA URNS AUTO: ABNORMAL /HPF
BASOPHILS # BLD AUTO: 0.04 K/UL (ref 0–0.2)
BASOPHILS NFR BLD: 0.2 % (ref 0–1.9)
BILIRUB SERPL-MCNC: 0.6 MG/DL (ref 0.1–1)
BILIRUB UR QL STRIP: NEGATIVE
BUN SERPL-MCNC: 8 MG/DL (ref 6–20)
CALCIUM SERPL-MCNC: 9.1 MG/DL (ref 8.7–10.5)
CHLORIDE SERPL-SCNC: 105 MMOL/L (ref 95–110)
CLARITY UR REFRACT.AUTO: CLEAR
CO2 SERPL-SCNC: 27 MMOL/L (ref 23–29)
COLOR UR AUTO: YELLOW
CREAT SERPL-MCNC: 1 MG/DL (ref 0.5–1.4)
DIFFERENTIAL METHOD BLD: ABNORMAL
EOSINOPHIL # BLD AUTO: 0 K/UL (ref 0–0.5)
EOSINOPHIL NFR BLD: 0 % (ref 0–8)
ERYTHROCYTE [DISTWIDTH] IN BLOOD BY AUTOMATED COUNT: 13.8 % (ref 11.5–14.5)
EST. GFR  (NO RACE VARIABLE): >60 ML/MIN/1.73 M^2
GLUCOSE SERPL-MCNC: 137 MG/DL (ref 70–110)
GLUCOSE UR QL STRIP: NEGATIVE
HCT VFR BLD AUTO: 35.4 % (ref 37–48.5)
HCV AB SERPL QL IA: NEGATIVE
HEP C VIRUS HOLD SPECIMEN: NORMAL
HGB BLD-MCNC: 11.9 G/DL (ref 12–16)
HGB UR QL STRIP: ABNORMAL
HIV 1+2 AB+HIV1 P24 AG SERPL QL IA: NEGATIVE
HYALINE CASTS UR QL AUTO: 0 /LPF
IMM GRANULOCYTES # BLD AUTO: 0.17 K/UL (ref 0–0.04)
IMM GRANULOCYTES NFR BLD AUTO: 0.7 % (ref 0–0.5)
KETONES UR QL STRIP: NEGATIVE
LEUKOCYTE ESTERASE UR QL STRIP: ABNORMAL
LIPASE SERPL-CCNC: 15 U/L (ref 4–60)
LYMPHOCYTES # BLD AUTO: 1.2 K/UL (ref 1–4.8)
LYMPHOCYTES NFR BLD: 5.2 % (ref 18–48)
MCH RBC QN AUTO: 27.1 PG (ref 27–31)
MCHC RBC AUTO-ENTMCNC: 33.6 G/DL (ref 32–36)
MCV RBC AUTO: 81 FL (ref 82–98)
MICROSCOPIC COMMENT: ABNORMAL
MONOCYTES # BLD AUTO: 1.9 K/UL (ref 0.3–1)
MONOCYTES NFR BLD: 8.3 % (ref 4–15)
NEUTROPHILS # BLD AUTO: 19.8 K/UL (ref 1.8–7.7)
NEUTROPHILS NFR BLD: 85.6 % (ref 38–73)
NITRITE UR QL STRIP: POSITIVE
NON-SQ EPI CELLS #/AREA URNS AUTO: 4 /HPF
NRBC BLD-RTO: 0 /100 WBC
OHS QRS DURATION: 94 MS
OHS QTC CALCULATION: 403 MS
PH UR STRIP: 6 [PH] (ref 5–8)
PLATELET # BLD AUTO: 224 K/UL (ref 150–450)
PMV BLD AUTO: 12 FL (ref 9.2–12.9)
POTASSIUM SERPL-SCNC: 3.6 MMOL/L (ref 3.5–5.1)
PROT SERPL-MCNC: 7.7 G/DL (ref 6–8.4)
PROT UR QL STRIP: ABNORMAL
RBC # BLD AUTO: 4.39 M/UL (ref 4–5.4)
RBC #/AREA URNS AUTO: 10 /HPF (ref 0–4)
SODIUM SERPL-SCNC: 142 MMOL/L (ref 136–145)
SP GR UR STRIP: 1.01 (ref 1–1.03)
SQUAMOUS #/AREA URNS AUTO: 2 /HPF
URN SPEC COLLECT METH UR: ABNORMAL
UROBILINOGEN UR STRIP-ACNC: 1 EU/DL
WBC # BLD AUTO: 23.13 K/UL (ref 3.9–12.7)
WBC #/AREA URNS AUTO: >100 /HPF (ref 0–5)

## 2025-02-28 PROCEDURE — 86803 HEPATITIS C AB TEST: CPT | Performed by: EMERGENCY MEDICINE

## 2025-02-28 PROCEDURE — 87088 URINE BACTERIA CULTURE: CPT | Performed by: EMERGENCY MEDICINE

## 2025-02-28 PROCEDURE — 96374 THER/PROPH/DIAG INJ IV PUSH: CPT | Mod: ER

## 2025-02-28 PROCEDURE — 81000 URINALYSIS NONAUTO W/SCOPE: CPT | Mod: ER | Performed by: EMERGENCY MEDICINE

## 2025-02-28 PROCEDURE — 96375 TX/PRO/DX INJ NEW DRUG ADDON: CPT | Mod: ER

## 2025-02-28 PROCEDURE — 63600175 PHARM REV CODE 636 W HCPCS: Mod: ER | Performed by: EMERGENCY MEDICINE

## 2025-02-28 PROCEDURE — 80053 COMPREHEN METABOLIC PANEL: CPT | Mod: ER | Performed by: EMERGENCY MEDICINE

## 2025-02-28 PROCEDURE — 87389 HIV-1 AG W/HIV-1&-2 AB AG IA: CPT | Performed by: EMERGENCY MEDICINE

## 2025-02-28 PROCEDURE — 96361 HYDRATE IV INFUSION ADD-ON: CPT | Mod: ER

## 2025-02-28 PROCEDURE — 96372 THER/PROPH/DIAG INJ SC/IM: CPT | Performed by: EMERGENCY MEDICINE

## 2025-02-28 PROCEDURE — 93010 ELECTROCARDIOGRAM REPORT: CPT | Mod: ,,, | Performed by: INTERNAL MEDICINE

## 2025-02-28 PROCEDURE — 85025 COMPLETE CBC W/AUTO DIFF WBC: CPT | Mod: ER | Performed by: EMERGENCY MEDICINE

## 2025-02-28 PROCEDURE — 87086 URINE CULTURE/COLONY COUNT: CPT | Performed by: EMERGENCY MEDICINE

## 2025-02-28 PROCEDURE — 93005 ELECTROCARDIOGRAM TRACING: CPT | Mod: ER

## 2025-02-28 PROCEDURE — 87186 SC STD MICRODIL/AGAR DIL: CPT | Performed by: EMERGENCY MEDICINE

## 2025-02-28 PROCEDURE — 25500020 PHARM REV CODE 255: Mod: ER | Performed by: EMERGENCY MEDICINE

## 2025-02-28 PROCEDURE — 99285 EMERGENCY DEPT VISIT HI MDM: CPT | Mod: 25,ER

## 2025-02-28 PROCEDURE — 83690 ASSAY OF LIPASE: CPT | Mod: ER | Performed by: EMERGENCY MEDICINE

## 2025-02-28 RX ORDER — ONDANSETRON HYDROCHLORIDE 2 MG/ML
4 INJECTION, SOLUTION INTRAVENOUS ONCE
Status: COMPLETED | OUTPATIENT
Start: 2025-02-28 | End: 2025-02-28

## 2025-02-28 RX ORDER — PROMETHAZINE HYDROCHLORIDE 25 MG/1
25 TABLET ORAL EVERY 6 HOURS PRN
Qty: 15 TABLET | Refills: 0 | Status: SHIPPED | OUTPATIENT
Start: 2025-02-28

## 2025-02-28 RX ORDER — CEFTRIAXONE 1 G/1
1 INJECTION, POWDER, FOR SOLUTION INTRAMUSCULAR; INTRAVENOUS
Status: COMPLETED | OUTPATIENT
Start: 2025-02-28 | End: 2025-02-28

## 2025-02-28 RX ORDER — CEFDINIR 300 MG/1
300 CAPSULE ORAL 2 TIMES DAILY
Qty: 20 CAPSULE | Refills: 0 | Status: SHIPPED | OUTPATIENT
Start: 2025-02-28 | End: 2025-03-10

## 2025-02-28 RX ORDER — DICYCLOMINE HYDROCHLORIDE 10 MG/ML
20 INJECTION INTRAMUSCULAR
Status: COMPLETED | OUTPATIENT
Start: 2025-02-28 | End: 2025-02-28

## 2025-02-28 RX ADMIN — SODIUM CHLORIDE, POTASSIUM CHLORIDE, SODIUM LACTATE AND CALCIUM CHLORIDE 1000 ML: 600; 310; 30; 20 INJECTION, SOLUTION INTRAVENOUS at 06:02

## 2025-02-28 RX ADMIN — ONDANSETRON 4 MG: 2 INJECTION INTRAMUSCULAR; INTRAVENOUS at 06:02

## 2025-02-28 RX ADMIN — IOHEXOL 75 ML: 350 INJECTION, SOLUTION INTRAVENOUS at 07:02

## 2025-02-28 RX ADMIN — DICYCLOMINE HYDROCHLORIDE 20 MG: 20 INJECTION, SOLUTION INTRAMUSCULAR at 06:02

## 2025-02-28 RX ADMIN — CEFTRIAXONE 1 G: 1 INJECTION, POWDER, FOR SOLUTION INTRAMUSCULAR; INTRAVENOUS at 08:02

## 2025-02-28 NOTE — ED PROVIDER NOTES
Emergency Medicine Provider Note - 2/28/2025       History     Chief Complaint   Patient presents with    Nausea     Pt c/o NVD w/ abd discomfort x3days & congestion       Allergies:  Review of patient's allergies indicates:  No Known Allergies     History of Present Illness   HPI    2/28/2025, 6:21 AM  The history is provided by the  old chart and patient    Marcieleland Geiger is a 54 y.o. female presenting to the ED for nausea/vomiting/diarrhea and abdominal pain. . Patient has past medical history of gastroesophageal reflux disease, atopic dermatitis, hypertension, class 1 obesity, and diet controlled diabetes.  Patient reports that she started feeling bad on Tuesday, February 25, 2025.  It started with I feel like I have a stomach virus.  Patient has been having subjective fever, nausea, vomiting, and diarrhea.  Patient reports that it started with feeling like mucus in the throat.  Then she started having burning in the epigastric region as well as right lower quadrant.  Yesterday, patient had emesis x5, and loose stool x4.  It was not associated with any melena, hematochezia, or hematemesis.  Patient denies any chest pain, chest pressure, shortness of breath, rhinorrhea, or hematuria.  Patient does note painful urination.  Past surgical history:  Gastric bypass.  Patient denies international travel, routine alcohol use, NSAID  use pr antibiotic use.      Arrival mode: Private Vehicle     PCP: Tom Yeung MD     Past Medical History:  Past Medical History:   Diagnosis Date    Diabetes mellitus     Diabetes mellitus, type 2     GERD (gastroesophageal reflux disease)     HTN (hypertension)     Hyperlipidemia        Past Surgical History:  Past Surgical History:   Procedure Laterality Date    bariatic surgery           Family History:  Family History   Problem Relation Name Age of Onset    Diabetes Mother      Heart disease Mother      Heart attack Father      Heart disease Father      Diabetes Brother       Hypertension Brother      Breast cancer Paternal Aunt      Colon cancer Neg Hx      Ovarian cancer Neg Hx         Social History:  Social History     Tobacco Use    Smoking status: Former     Current packs/day: 0.00     Average packs/day: 0.5 packs/day for 2.0 years (1.0 ttl pk-yrs)     Types: Cigarettes     Start date:      Quit date:      Years since quittin.1    Smokeless tobacco: Never   Substance and Sexual Activity    Alcohol use: Yes     Comment: socially    Drug use: No    Sexual activity: Not Currently     Partners: Male       The Past Medical History, Social History, Surgical History and Family History was reviewed as documented above.     Review of Systems   Review of Systems   Constitutional:  Positive for fatigue and fever (Subjective).   HENT:  Negative for sore throat.    Respiratory:  Negative for shortness of breath.    Cardiovascular:  Negative for chest pain.   Gastrointestinal:  Positive for abdominal pain (Epigastric and RLQ), diarrhea (x 5 ()), nausea (x 4  ()) and vomiting.   Genitourinary:  Positive for dysuria.   Musculoskeletal:  Negative for back pain.   Skin:  Negative for rash.   Neurological:  Negative for weakness.   Hematological:  Does not bruise/bleed easily.          Physical Exam     Initial Vitals [25 0612]   BP Pulse Resp Temp SpO2   113/60 102 17 98.2 °F (36.8 °C) 99 %      MAP       --          Physical Exam    Nursing Notes and Vital Signs Reviewed.  Constitutional: Patient is in no apparent distress. Well-developed and well-nourished.  Head: Atraumatic. Normocephalic.  Eyes: PERRL. EOM intact. Conjunctivae are not pale. No scleral icterus.  ENT: Mucous membranes are dry. Oropharynx is clear and symmetric.  Uvula midline.  No exudate.  Neck: Supple. Full ROM. No lymphadenopathy.  Cardiovascular: Regular rate. Regular rhythm. No murmurs, rubs, or gallops. Distal pulses are 2+ and symmetric.  Pulmonary/Chest: No respiratory distress. Clear to  "auscultation bilaterally. No wheezing or rales.  Abdominal: Soft and non-distended.  There is no tenderness.  No rebound, guarding, or rigidity. Good bowel sounds.  Musculoskeletal: Moves all extremities. No obvious deformities. No edema. No calf tenderness.  Genitourinary: No CVA tenderness bilaterally  Skin: Warm and dry.  Neurological:  Alert, awake, and appropriate.  Normal speech.  No acute focal neurological deficits are appreciated.  Psychiatric: Normal affect. Good eye contact. Appropriate in content.     ED Course   ED Procedures:  Procedures    ED Vital Signs:  Vitals:    02/28/25 0612 02/28/25 0805   BP: 113/60 123/60   Pulse: 102 98   Resp: 17 16   Temp: 98.2 °F (36.8 °C)    TempSrc: Oral    SpO2: 99% 100%   Weight: 89.6 kg (197 lb 8.5 oz)    Height: 5' 8" (1.727 m)        All Lab Results:  Results for orders placed or performed during the hospital encounter of 02/28/25   Urinalysis, Reflex to Urine Culture Urine, Clean Catch    Collection Time: 02/28/25  6:20 AM    Specimen: Urine   Result Value Ref Range    Specimen UA Urine, Clean Catch     Color, UA Yellow Yellow, Straw, July    Appearance, UA Clear Clear    pH, UA 6.0 5.0 - 8.0    Specific Gravity, UA 1.010 1.005 - 1.030    Protein, UA 2+ (A) Negative    Glucose, UA Negative Negative    Ketones, UA Negative Negative    Bilirubin (UA) Negative Negative    Occult Blood UA 3+ (A) Negative    Nitrite, UA Positive (A) Negative    Urobilinogen, UA 1.0 <2.0 EU/dL    Leukocytes, UA 3+ (A) Negative   Urinalysis Microscopic    Collection Time: 02/28/25  6:20 AM   Result Value Ref Range    RBC, UA 10 (H) 0 - 4 /hpf    WBC, UA >100 (H) 0 - 5 /hpf    Bacteria Moderate (A) None-Occ /hpf    Squam Epithel, UA 2 /hpf    Non-Squam Epith 4 (A) <1/hpf /hpf    Hyaline Casts, UA 0 0-1/lpf /lpf    Microscopic Comment SEE COMMENT    CBC W/ AUTO DIFFERENTIAL    Collection Time: 02/28/25  6:38 AM   Result Value Ref Range    WBC 23.13 (H) 3.90 - 12.70 K/uL    RBC 4.39 4.00 - " 5.40 M/uL    Hemoglobin 11.9 (L) 12.0 - 16.0 g/dL    Hematocrit 35.4 (L) 37.0 - 48.5 %    MCV 81 (L) 82 - 98 fL    MCH 27.1 27.0 - 31.0 pg    MCHC 33.6 32.0 - 36.0 g/dL    RDW 13.8 11.5 - 14.5 %    Platelets 224 150 - 450 K/uL    MPV 12.0 9.2 - 12.9 fL    Immature Granulocytes 0.7 (H) 0.0 - 0.5 %    Gran # (ANC) 19.8 (H) 1.8 - 7.7 K/uL    Immature Grans (Abs) 0.17 (H) 0.00 - 0.04 K/uL    Lymph # 1.2 1.0 - 4.8 K/uL    Mono # 1.9 (H) 0.3 - 1.0 K/uL    Eos # 0.0 0.0 - 0.5 K/uL    Baso # 0.04 0.00 - 0.20 K/uL    nRBC 0 0 /100 WBC    Gran % 85.6 (H) 38.0 - 73.0 %    Lymph % 5.2 (L) 18.0 - 48.0 %    Mono % 8.3 4.0 - 15.0 %    Eosinophil % 0.0 0.0 - 8.0 %    Basophil % 0.2 0.0 - 1.9 %    Differential Method Automated    Comp. Metabolic Panel    Collection Time: 02/28/25  6:38 AM   Result Value Ref Range    Sodium 142 136 - 145 mmol/L    Potassium 3.6 3.5 - 5.1 mmol/L    Chloride 105 95 - 110 mmol/L    CO2 27 23 - 29 mmol/L    Glucose 137 (H) 70 - 110 mg/dL    BUN 8 6 - 20 mg/dL    Creatinine 1.0 0.5 - 1.4 mg/dL    Calcium 9.1 8.7 - 10.5 mg/dL    Total Protein 7.7 6.0 - 8.4 g/dL    Albumin 3.1 (L) 3.5 - 5.2 g/dL    Total Bilirubin 0.6 0.1 - 1.0 mg/dL    Alkaline Phosphatase 85 40 - 150 U/L    AST 12 10 - 40 U/L    ALT 14 10 - 44 U/L    eGFR >60.0 >60 mL/min/1.73 m^2    Anion Gap 10 8 - 16 mmol/L   Lipase    Collection Time: 02/28/25  6:38 AM   Result Value Ref Range    Lipase 15 4 - 60 U/L           ECG Results              EKG 12-lead (Preliminary result)  Result time 02/28/25 06:59:48      Wet Read by Alivia Miller DO (02/28/25 06:59:48, [facility], Emergency Medicine)    Rate of 91 beats per minute.  Normal sinus rhythm.  Normal axis.  No ST segment elevation.  No STEMI.                                    Imaging Results:  Imaging Results              CT Abdomen Pelvis With IV Contrast NO Oral Contrast (Final result)  Result time 02/28/25 08:22:50      Final result by Jose Bishop MD (02/28/25  08:22:50)                   Impression:      1.  There are areas of subtle decreased enhancement within both kidneys, concerning for early areas of infection such as pyelonephritis or focal nephronia.  There is mild enhancement of the renal collecting system walls, without hydronephrosis, which could represent urinary tract infection.  Mild perinephric stranding densities.    2. There is fluid throughout multiple normal appearing small bowel loops.  Nonspecific finding.  Gastroenteritis or other diarrheal disease is difficult to exclude in the right clinical setting.    3.  Negative for acute process otherwise.  Normal appendix.  Negative for renal stone disease or hydronephrosis.  Negative for free air.    4.  Multiple bilateral peripelvic cysts measuring up to 3.6 cm.  Multiple hepatic cysts measuring up to 1.3 cm.  Gastric sleeve postoperative changes.  Laxity of the linea alba.  Diverticulosis.  Other nonemergent findings as described above.    All CT scans at this facility are performed  using dose modulation techniques as appropriate to performed exam including the following:  automated exposure control; adjustment of mA and/or kV according to the patients size (this includes techniques or standardized protocols for targeted exams where dose is matched to indication/reason for exam: i.e. extremities or head);  iterative reconstruction technique.      Electronically signed by: Jose Bishop MD  Date:    02/28/2025  Time:    08:22               Narrative:    EXAMINATION:  CT ABDOMEN PELVIS WITH IV CONTRAST, multiplanar reconstructions    CLINICAL HISTORY:  Abdominal pain, acute, nonlocalized;    TECHNIQUE:  Axial images through the abdomen and pelvis were obtained with the use of IV contrast.  Sagittal and coronal reconstructions are provided for review.  Oral contrast was not utilized.    COMPARISON:  Abdominal ultrasound from October 4, 2022    FINDINGS:  LUNG BASES: Dependent atelectasis in the lung bases.   Lung bases are otherwise clear.  Negative for pleural or pericardial effusions. The distal esophagus is normal.  Negative for coronary artery calcifications.    ABDOMEN: Focal fatty infiltration medial segment left hepatic lobe along the falciform ligament.  Too numerous to count cystic areas throughout the liver measuring up to 1.3 cm in the left hepatic lobe.  Phrygian cap of the gallbladder.  The there are multiple bilateral parapelvic cysts measuring up to 3.6 cm.  There is enhancement of the bilateral renal collecting system walls, with mild perinephric stranding densities noted.  Slightly heterogeneous enhancement to the right lower pole renal cortex noted.  The liver, spleen and gallbladder otherwise appear normal.  The pancreas is unremarkable.  Kidneys and adrenal glands are otherwise normal.    Negative for adenopathy, ascites or inflammatory change noted within the abdomen or pelvis.  Negative for vascular abnormalities.  Portal vein is patent.    Normal appendix.  There is fluid throughout multiple normal appearing small bowel loops.  Appropriate stool.  Gastric sleeve postoperative changes noted.  Moderate diverticulosis.  Negative for free air.    PELVIS: The urinary bladder is unremarkable.    The female pelvic organs are normal. There are pelvic phleboliths.    Laxity of the linea alba.  The abdominal wall is otherwise intact.    No significant osseous abnormality is identified.  Negative for significant spinal canal stenosis. Multilevel marginal spondylosis.    Negative for groin adenopathy.                                            The Emergency Provider reviewed the vital signs and test results, which are outlined above.     ED Discussion   ED Medication(s):  Medications   lactated ringers bolus 1,000 mL (0 mLs Intravenous Stopped 2/28/25 0740)   ondansetron injection 4 mg (4 mg Intravenous Given 2/28/25 0640)   dicyclomine injection 20 mg (20 mg Intramuscular Given 2/28/25 0641)   cefTRIAXone  injection 1 g (1 g Intravenous Given 2/28/25 0804)   iohexoL (OMNIPAQUE 350) injection 75 mL (75 mLs Intravenous Given 2/28/25 0752)       ED Course as of 02/28/25 1104   Fri Feb 28, 2025   0641 I discussed with patient and/or family/caretaker that evaluation in the ED does not suggest any emergent or life threatening medical conditions requiring immediate intervention beyond what was provided in the ED, and I believe patient is safe for discharge.  Regardless, an unremarkable evaluation in the ED does not preclude the development or presence of a serious of life threatening condition. As such, patient was instructed to return immediately for any worsening or change in current symptoms.   [LB]   0649 WBC, UA(!): >100 [LB]   0649 Bacteria, UA(!): Moderate [LB]   0649 NITRITE UA(!): Positive [LB]   0649 Leukocyte Esterase, UA(!): 3+ [LB]   0715 Glucose(!): 137 [LB]   0730 WBC(!): 23.13 [LB]   0730 Hemoglobin(!): 11.9 [LB]   0730 Hematocrit(!): 35.4 [LB]   0845 Secure chat with Urology (Dr. Shipley):  no abscess, okay for outpatient followup.  [LB]   0850 Results of tests were discussed with patient including suspicions for pyelonephritis as well as pelvic cyst.  Discussed importance of outpatient pelvic ultrasound.  Patient verbalized understanding. [LB]      ED Course User Index  [LB] Alivia Miller, DO                    08:57  Reassessment: Dr. Miller reassessed the pt.  The pt is resting comfortably and is NAD.  Pt states their sx have improved. Discussed test results, shared treatment plan, specific conditions for return, and the need for f/u.  Answered their questions at this time.  Pt understands and agrees to the plan.  The pt has remained hemodynamically stable through ED course and is stable for discharge.    I discussed with patient and/or family/caretaker that evaluation in the ED does not suggest any emergent or life threatening medical conditions requiring immediate intervention beyond what was  provided in the ED, and I believe patient is safe for discharge.  Regardless, an unremarkable evaluation in the ED does not preclude the development or presence of a serious of life threatening condition. As such, patient was instructed to return immediately for any worsening or change in current symptoms.     MIPS Measures     Smoker? No     Hypertension: Patient has a known history of hypertension.     Medical Decision Making                 Medical Decision Making  Differential Diagnoses: Based on the available information and the initial assessment, the working DIFFERENTIAL DIAGNOSIS considered during this evaluation included, but not limited to: Acute Coronary Syndrome, Cholecystitis, Choledocholithiasis, Diverticulosis/Diverticulitis, Dyspepsia/Gerd, Gastroenteritis, Gastroparesis, Small/Large Bowel Obstruction, and Strep throat    ED course:  White cell count 23.13.  Mild anemia.  H/H 11.9/35.4.  Positive left shift.  No bandemia.  BUN 10.  Creatinine 1.0.  Liver enzymes normal.  Lipase 15.  Nitrite positive.  Leukocyte esterase positive.  Greater than 100 UA  WBCs.  Moderate bacteria.  RBC UA 10.  Urine culture in process.  Patient given 1 g of Rocephin IV piggyback.  CT abdomen pelvis:  Subtle decreased enhancement with in both kidneys concerning for early areas of infection such as pyelonephritis or focal nephroma.  Mild enhancement of the renal collecting system walls, without hydronephrosis which could represent urinary tract infection.  Urology was consulted using secure chat:  As there is no abscess, recommend outpatient follow up.  Patient given 1 L lactated Ringer's as well as 1 g of Rocephin.  Urine culture pending.  Discussed these results with patient.  Also recommended outpatient ultrasound of pelvis to evaluate peripelvic cyst.  Return precautions given.  Patient is nontoxic appearing. Patient able to tolerate PO.        Amount and/or Complexity of Data Reviewed  Labs: ordered. Decision-making  details documented in ED Course.  Radiology: ordered. Decision-making details documented in ED Course.  ECG/medicine tests: ordered and independent interpretation performed. Decision-making details documented in ED Course.    Risk  Prescription drug management.  Risk Details: Discharge Medication List as of 2/28/2025  8:57 AM    START taking these medications    cefdinir (OMNICEF) 300 MG capsule  Take 1 capsule (300 mg total) by mouth 2 (two) times daily. for 10 days, Starting Fri 2/28/2025, Until Mon 3/10/2025, Normal    promethazine (PHENERGAN) 25 MG tablet  Take 1 tablet (25 mg total) by mouth every 6 (six) hours as needed for Nausea. Sedation warning, Starting Fri 2/28/2025, Normal                Coding    Referrals:  No orders of the defined types were placed in this encounter.      Prescription Management: I performed a review of the patient's current Rx medication list as input by nursing staff.    Discharge Medication List as of 2/28/2025  8:57 AM        START taking these medications    Details   cefdinir (OMNICEF) 300 MG capsule Take 1 capsule (300 mg total) by mouth 2 (two) times daily. for 10 days, Starting Fri 2/28/2025, Until Mon 3/10/2025, Normal      promethazine (PHENERGAN) 25 MG tablet Take 1 tablet (25 mg total) by mouth every 6 (six) hours as needed for Nausea. Sedation warning, Starting Fri 2/28/2025, Normal           CONTINUE these medications which have NOT CHANGED    Details   atorvastatin (LIPITOR) 20 MG tablet Take 1 tablet (20 mg total) by mouth once daily., Starting Mon 7/22/2024, Normal      azelastine (OPTIVAR) 0.05 % ophthalmic solution Place 1 drop into both eyes 2 (two) times daily., Starting Wed 8/28/2024, Until Thu 8/28/2025, Normal      blood pressure monitor (BLOOD PRESSURE KIT) Kit 1 kit by Misc.(Non-Drug; Combo Route) route once daily., Starting Mon 3/27/2023, Normal      cetirizine (ZYRTEC) 10 MG tablet TAKE 1 TABLET BY MOUTH ONCE DAILY, Starting Sun 10/13/2024, Normal     "  chlorhexidine (PERIDEX) 0.12 % solution RINSE AND GARGLE 15 ML BY MOUTH OR THROAT TWICE DAILY AS DIRECTED, Normal      ergocalciferol (ERGOCALCIFEROL) 50,000 unit Cap Take 1 capsule (50,000 Units total) by mouth every 7 days., Starting Thu 10/31/2024, Until Fri 10/31/2025, Normal      hydrocortisone 1 % cream Apply twice a day, Normal      omeprazole (PRILOSEC) 20 MG capsule Take 1 capsule (20 mg total) by mouth once daily., Starting Mon 7/22/2024, Normal      RESTASIS 0.05 % ophthalmic emulsion Place 1 drop into both eyes 2 (two) times daily., Starting Mon 7/22/2024, Normal      semaglutide, weight loss, (WEGOVY) 0.5 mg/0.5 mL PnIj Inject 0.5 mg into the skin once a week., Starting Sun 2/16/2025, Until Tue 3/18/2025, Normal      blood-glucose meter (TRUE METRIX GLUCOSE METER) Misc USE TWO TIMES A DAY, Normal      !! lancets (ONETOUCH DELICA PLUS LANCET) 30 gauge Misc USE AS DIRECTED, Normal      olmesartan (BENICAR) 20 MG tablet Take 1 tablet (20 mg total) by mouth once daily., Starting Mon 7/22/2024, Until Sat 1/18/2025, Normal      semaglutide, weight loss, (WEGOVY) 1 mg/0.5 mL PnIj Inject 1 mg into the skin once a week., Starting Wed 3/19/2025, Until Fri 4/18/2025, Normal      semaglutide, weight loss, (WEGOVY) 1.7 mg/0.75 mL PnIj Inject 1.7 mg into the skin once a week., Starting Sat 4/19/2025, Until Mon 5/19/2025, Normal      semaglutide, weight loss, (WEGOVY) 2.4 mg/0.75 mL PnIj Inject 2.4 mg into the skin once a week., Starting Tue 5/20/2025, Until Thu 6/19/2025, Normal      TRUE METRIX GLUCOSE TEST STRIP Strp USE TO TEST TWICE DAILY., Normal      !! TRUEPLUS LANCETS 33 gauge Misc Apply topically 2 (two) times daily., Starting Sun 6/20/2021, Historical Med       !! - Potential duplicate medications found. Please discuss with provider.           Discussed case verbally with: N/A      Portions of this note may have been created with voice recognition software. Occasional "wrong-word" or "sound-a-like" " substitutions may have occurred due to the inherent limitations of voice recognition software. Please, read the note carefully and recognize, using context, where substitutions have occurred.          Clinical Impression       ICD-10-CM ICD-9-CM   1. Pyelonephritis  N12 590.80   2. Epigastric pain  R10.13 789.06   3. Pelvic cyst in female  N94.89 625.8   4. Nausea and vomiting, unspecified vomiting type  R11.2 787.01        Disposition        Disposition: Discharge to home  Patient condition: Stable    ED Follow-up      Follow-up Information       Tom Yeung MD In 2 days.    Specialties: Internal Medicine, Family Medicine  Why: Return to emergency department for persistent high fever despite being on antibiotics for 48 hours, vomiting, severe stomach pain, confusion, passing out, or other concerns  Contact information:  [address] [address] [id number] [phone]                                        Alivia Miller, DO  02/28/25 9422

## 2025-03-03 LAB — BACTERIA UR CULT: ABNORMAL

## 2025-03-17 ENCOUNTER — RESULTS FOLLOW-UP (OUTPATIENT)
Dept: INTERNAL MEDICINE | Facility: CLINIC | Age: 55
End: 2025-03-17

## 2025-03-17 ENCOUNTER — OFFICE VISIT (OUTPATIENT)
Dept: INTERNAL MEDICINE | Facility: CLINIC | Age: 55
End: 2025-03-17
Payer: MEDICAID

## 2025-03-17 VITALS
BODY MASS INDEX: 30.27 KG/M2 | OXYGEN SATURATION: 98 % | HEIGHT: 68 IN | DIASTOLIC BLOOD PRESSURE: 76 MMHG | HEART RATE: 83 BPM | TEMPERATURE: 99 F | WEIGHT: 199.75 LBS | SYSTOLIC BLOOD PRESSURE: 120 MMHG

## 2025-03-17 DIAGNOSIS — N30.00 ACUTE CYSTITIS WITHOUT HEMATURIA: Primary | ICD-10-CM

## 2025-03-17 DIAGNOSIS — E78.49 OTHER HYPERLIPIDEMIA: ICD-10-CM

## 2025-03-17 DIAGNOSIS — I10 ESSENTIAL HYPERTENSION: ICD-10-CM

## 2025-03-17 DIAGNOSIS — R35.0 URINARY FREQUENCY: Primary | ICD-10-CM

## 2025-03-17 DIAGNOSIS — E55.9 VITAMIN D DEFICIENCY: ICD-10-CM

## 2025-03-17 DIAGNOSIS — E11.9 TYPE 2 DIABETES MELLITUS WITHOUT COMPLICATION, WITHOUT LONG-TERM CURRENT USE OF INSULIN: ICD-10-CM

## 2025-03-17 LAB
BACTERIA #/AREA URNS AUTO: ABNORMAL /HPF
BILIRUB UR QL STRIP: NEGATIVE
CLARITY UR REFRACT.AUTO: ABNORMAL
COLOR UR AUTO: YELLOW
GLUCOSE UR QL STRIP: NEGATIVE
HGB UR QL STRIP: ABNORMAL
HYALINE CASTS UR QL AUTO: 0 /LPF
KETONES UR QL STRIP: NEGATIVE
LEUKOCYTE ESTERASE UR QL STRIP: ABNORMAL
MICROSCOPIC COMMENT: ABNORMAL
NITRITE UR QL STRIP: POSITIVE
PH UR STRIP: 7 [PH] (ref 5–8)
PROT UR QL STRIP: ABNORMAL
RBC #/AREA URNS AUTO: 5 /HPF (ref 0–4)
SP GR UR STRIP: 1.01 (ref 1–1.03)
SQUAMOUS #/AREA URNS AUTO: 3 /HPF
URN SPEC COLLECT METH UR: ABNORMAL
UROBILINOGEN UR STRIP-ACNC: NEGATIVE EU/DL
WBC #/AREA URNS AUTO: >100 /HPF (ref 0–5)
WBC CLUMPS UR QL AUTO: ABNORMAL

## 2025-03-17 PROCEDURE — G2211 COMPLEX E/M VISIT ADD ON: HCPCS | Mod: S$PBB,,, | Performed by: STUDENT IN AN ORGANIZED HEALTH CARE EDUCATION/TRAINING PROGRAM

## 2025-03-17 PROCEDURE — 1159F MED LIST DOCD IN RCRD: CPT | Mod: CPTII,,, | Performed by: STUDENT IN AN ORGANIZED HEALTH CARE EDUCATION/TRAINING PROGRAM

## 2025-03-17 PROCEDURE — 3008F BODY MASS INDEX DOCD: CPT | Mod: CPTII,,, | Performed by: STUDENT IN AN ORGANIZED HEALTH CARE EDUCATION/TRAINING PROGRAM

## 2025-03-17 PROCEDURE — 87661 TRICHOMONAS VAGINALIS AMPLIF: CPT | Performed by: STUDENT IN AN ORGANIZED HEALTH CARE EDUCATION/TRAINING PROGRAM

## 2025-03-17 PROCEDURE — 81000 URINALYSIS NONAUTO W/SCOPE: CPT | Mod: PO | Performed by: STUDENT IN AN ORGANIZED HEALTH CARE EDUCATION/TRAINING PROGRAM

## 2025-03-17 PROCEDURE — 87186 SC STD MICRODIL/AGAR DIL: CPT | Performed by: STUDENT IN AN ORGANIZED HEALTH CARE EDUCATION/TRAINING PROGRAM

## 2025-03-17 PROCEDURE — 87088 URINE BACTERIA CULTURE: CPT | Performed by: STUDENT IN AN ORGANIZED HEALTH CARE EDUCATION/TRAINING PROGRAM

## 2025-03-17 PROCEDURE — 99214 OFFICE O/P EST MOD 30 MIN: CPT | Mod: S$PBB,,, | Performed by: STUDENT IN AN ORGANIZED HEALTH CARE EDUCATION/TRAINING PROGRAM

## 2025-03-17 PROCEDURE — 99214 OFFICE O/P EST MOD 30 MIN: CPT | Mod: PBBFAC,PO | Performed by: STUDENT IN AN ORGANIZED HEALTH CARE EDUCATION/TRAINING PROGRAM

## 2025-03-17 PROCEDURE — 4010F ACE/ARB THERAPY RXD/TAKEN: CPT | Mod: CPTII,,, | Performed by: STUDENT IN AN ORGANIZED HEALTH CARE EDUCATION/TRAINING PROGRAM

## 2025-03-17 PROCEDURE — 99999 PR PBB SHADOW E&M-EST. PATIENT-LVL IV: CPT | Mod: PBBFAC,,, | Performed by: STUDENT IN AN ORGANIZED HEALTH CARE EDUCATION/TRAINING PROGRAM

## 2025-03-17 PROCEDURE — 1160F RVW MEDS BY RX/DR IN RCRD: CPT | Mod: CPTII,,, | Performed by: STUDENT IN AN ORGANIZED HEALTH CARE EDUCATION/TRAINING PROGRAM

## 2025-03-17 PROCEDURE — 3074F SYST BP LT 130 MM HG: CPT | Mod: CPTII,,, | Performed by: STUDENT IN AN ORGANIZED HEALTH CARE EDUCATION/TRAINING PROGRAM

## 2025-03-17 PROCEDURE — 3044F HG A1C LEVEL LT 7.0%: CPT | Mod: CPTII,,, | Performed by: STUDENT IN AN ORGANIZED HEALTH CARE EDUCATION/TRAINING PROGRAM

## 2025-03-17 PROCEDURE — 87086 URINE CULTURE/COLONY COUNT: CPT | Performed by: STUDENT IN AN ORGANIZED HEALTH CARE EDUCATION/TRAINING PROGRAM

## 2025-03-17 PROCEDURE — 3078F DIAST BP <80 MM HG: CPT | Mod: CPTII,,, | Performed by: STUDENT IN AN ORGANIZED HEALTH CARE EDUCATION/TRAINING PROGRAM

## 2025-03-17 RX ORDER — OLMESARTAN MEDOXOMIL 20 MG/1
20 TABLET ORAL DAILY
Qty: 90 TABLET | Refills: 1 | Status: SHIPPED | OUTPATIENT
Start: 2025-03-17 | End: 2025-09-13

## 2025-03-17 RX ORDER — OLOPATADINE HYDROCHLORIDE 1 MG/ML
SOLUTION/ DROPS OPHTHALMIC
COMMUNITY

## 2025-03-17 RX ORDER — CYANOCOBALAMIN 500 UG/1
SPRAY, METERED NASAL
COMMUNITY

## 2025-03-17 RX ORDER — ERGOCALCIFEROL 1.25 MG/1
50000 CAPSULE ORAL
Qty: 12 CAPSULE | Refills: 3 | Status: SHIPPED | OUTPATIENT
Start: 2025-03-17 | End: 2026-03-17

## 2025-03-17 RX ORDER — LEVOFLOXACIN 500 MG/1
500 TABLET, FILM COATED ORAL DAILY
Qty: 7 TABLET | Refills: 0 | Status: SHIPPED | OUTPATIENT
Start: 2025-03-17 | End: 2025-03-24

## 2025-03-17 NOTE — PROGRESS NOTES
Chief Complaint   Patient presents with    Medication Problem    Urinary Tract Infection     HPI: Marcie Geiger is a 54 y.o. female  with Pmhx listed below who presents to clinic for    History of Present Illness    CHIEF COMPLAINT:  - Ms. Geiger presents with persistent urinary symptoms following treatment for a UTI two weeks ago.    HPI:  Ms. Geiger was diagnosed with a severe UTI in the emergency room approximately 2 weeks ago. She was prescribed antibiotics, which she completed about a week ago. Her symptoms have not fully resolved. She continues to have a burning sensation when urinating, particularly at the end of urination. She describes it as a tingling, burning feeling and states she can feel urine passing through her urethra, which she finds abnormal.    She reports increased urinary frequency, significantly different from her pre-UTI state. She mentions having occasional fever, though it is unclear if this is ongoing or was only present during the initial infection. She also complains of foul-smelling and irritating vaginal discharge. She denies seeing blood in her urine.    During her ER evaluation on February 28th, she had abdominal pain along with her urinary symptoms. She received a CT, which revealed a periodic pelvic cyst, though the exact nature and location of the cyst are not specified. The ER physician prescribed Cefdinir capsules to be taken twice daily for 10 days.    She expresses concern that the antibiotics may not have been effective enough and believes she needs additional antibiotic treatment. She is sexually active and reports genital irritation.    She denies blood in urine, nausea, and vomiting.               Problem List:  Problem List[1]    ROS: Negative except as noted above.       Current Meds:  Current Medications[2]   PE:  BP: 120/76  Pulse: 83     Temp: 98.8 °F (37.1 °C)  Weight: 90.6 kg (199 lb 11.8 oz) Body mass index is 30.37 kg/m².    Wt Readings from Last 5  Encounters:   03/17/25 90.6 kg (199 lb 11.8 oz)   02/28/25 89.6 kg (197 lb 8.5 oz)   01/16/25 91.9 kg (202 lb 9.6 oz)   10/01/24 93.9 kg (207 lb)   09/17/24 94 kg (207 lb 3.7 oz)     General appearance: alert and cooperative, not in acute distress  Head: normocephalic, without obvious abnormality, atraumatic  Eyes: conjunctivae/corneas clear. PERRL, EOM's intact.  Ears: clear tympanic membranes   Neck: no adenopathy, supple, symmetrical, trachea midline and thyroid not enlarged, symmetric, no tenderness/mass/nodules, no JVD  Throat: lips, mucosa, and tongue normal; teeth and gums normal; no thrush  Chest: no reproducible chest pain   Heart: regular rate and rhythm, S1, S2 normal, no murmur, click, rub or gallop  Lungs: unlabored respiration, bilateral equal air entry, normal vesicular breath sound heard, no wheezing, rhonchi   Abdomen: soft, non-tender, non-distended; bowel sounds +; no masses,  no organomegaly, no ascites   Extremities: normal, atraumatic, no cyanosis or edema noted B/L upper and lower extremities.  Skin: skin color, texture, turgor normal. No rashes or lesions noted.  Neurologic: grossly intact      Lab:  Lab Results   Component Value Date    WBC 23.13 (H) 02/28/2025    HGB 11.9 (L) 02/28/2025    HCT 35.4 (L) 02/28/2025    MCV 81 (L) 02/28/2025     02/28/2025     02/28/2025    K 3.6 02/28/2025     02/28/2025    CO2 27 02/28/2025    BUN 8 02/28/2025     (H) 02/28/2025    CALCIUM 9.1 02/28/2025    MG 2.1 09/05/2018    PHOS 2.9 09/05/2018    PHOS 2.9 09/05/2018    AST 12 02/28/2025    ALT 14 02/28/2025    CHOL 153 07/22/2024    HDL 41 07/22/2024    LDLCALC 98.4 07/22/2024    TRIG 68 07/22/2024    TSH 1.007 09/27/2023       Impression:    ICD-10-CM ICD-9-CM    1. Urinary frequency  R35.0 788.41 Urinalysis, Reflex to Urine Culture Urine, Clean Catch      Trichomonas vaginalis, RNA, Qual, Urine      C. trachomatis/N. gonorrhoeae by AMP DNA      2. Vitamin D deficiency  E55.9  268.9 ergocalciferol (ERGOCALCIFEROL) 50,000 unit Cap      3. Essential hypertension  I10 401.9 olmesartan (BENICAR) 20 MG tablet          Assessment & Plan    URINARY TRACT INFECTION:  - Evaluated persistent UTI symptoms despite previous ER treatment with Septinir.  - Ms. Geiger reports ongoing symptoms of UTI including frequent urination, burning sensation while urinating, and intermittent fever, despite completing a course of antibiotics.  - Considered possibility of inadequate antibiotic treatment or resistant infection.  - Ordered urine test to confirm presence of ongoing UTI and guide further treatment.  - Instructed the patient to contact the office for urine test results.  - Further treatment plan to be determined based on test results.  - Documented the patient's recent history of UTI treated in the emergency room.    ACUTE VAGINITIS:  - Assessed potential vaginal infection due to reported discharge and irritation.  - Ms. Geiger reports vaginal discharge and irritation.    OVARIAN CYST:  - Noted the patient's previous diagnosis of ovarian cysts from CT.    GENERAL EXAMINATION:  - Performed abdominal exam to rule out other sources of discomfort.          1. Vitamin D deficiency  - ergocalciferol (ERGOCALCIFEROL) 50,000 unit Cap; Take 1 capsule (50,000 Units total) by mouth every 7 days.  Dispense: 12 capsule; Refill: 3    2. Essential hypertension  Low salt diet.  Enouraged to increase in physical activity at least 30 minutes of brisk walking/day , 5 days a week  Advised weight loss    Advised for DASH diet - The Dietary Approaches to Stop Hypertension (DASH) is high in vegetables, fruits, low-fat dairy products, whole grains, poultry, fish, and nuts and low in sweets, sugar-sweetened beverages, and red meats   Stop smoking.  Limit caffeine intake  Limit alcohol intake <3/day for men and <2/day for women.  Advised to be compliant with medication.  Please monitor your blood pressure regularly at home   Return  precaution provided    - olmesartan (BENICAR) 20 MG tablet; Take 1 tablet (20 mg total) by mouth once daily.  Dispense: 90 tablet; Refill: 1    3. Urinary frequency (Primary)  - Urinalysis, Reflex to Urine Culture Urine, Clean Catch  - Trichomonas vaginalis, RNA, Qual, Urine  - C. trachomatis/N. gonorrhoeae by AMP DNA; Future    4. Type 2 diabetes mellitus without complication, without long-term current use of insulin   Latest Reference Range & Units 09/27/23 09:12 04/01/24 09:18 07/22/24 08:04 09/17/24 08:23 01/16/25 08:39   Hemoglobin A1C External 4.0 - 5.6 % 5.2 5.4 5.2 5.1 5.0   Estimated Avg Glucose 68 - 131 mg/dL 103 108 103 100 97   Stable  Was on wegovy initially. Not using it anymore due to insurance coverage issues.    5. Other hyperlipidemia  On statin to be continued.       No future appointments.    I spent a total of 32 minutes on the day of the visit.This includes face to face time and non-face to face time preparing to see the patient (eg, review of tests), obtaining and/or reviewing separately obtained history, documenting clinical information in the electronic or other health record, independently interpreting results and communicating results to the patient/family/caregiver, or care coordinator.  Visit today included increased complexity associated with the care of the episodic problem   addressed and managing the longitudinal care of the patient due to the serious and/or complex managed problem(s) .     Tom Yeung MD    This note was generated with the assistance of ambient listening technology. Verbal consent was obtained by the patient and accompanying visitor(s) for the recording of patient appointment to facilitate this note. I attest to having reviewed and edited the generated note for accuracy, though some syntax or spelling errors may persist. Please contact the author of this note for any clarification.          [1]   Patient Active Problem List  Diagnosis    Type 2 diabetes  mellitus without complication, without long-term current use of insulin    Gastroesophageal reflux disease without esophagitis    Atopic dermatitis    Essential hypertension    Class 1 obesity due to excess calories with serious comorbidity and body mass index (BMI) of 34.0 to 34.9 in adult    Vitamin D deficiency   [2]   Current Outpatient Medications   Medication Sig Dispense Refill    atorvastatin (LIPITOR) 20 MG tablet Take 1 tablet (20 mg total) by mouth once daily. 90 tablet 3    azelastine (OPTIVAR) 0.05 % ophthalmic solution Place 1 drop into both eyes 2 (two) times daily. 6 mL 6    blood pressure monitor (BLOOD PRESSURE KIT) Kit 1 kit by Misc.(Non-Drug; Combo Route) route once daily. 1 each 0    blood-glucose meter (TRUE METRIX GLUCOSE METER) Misc USE TWO TIMES A DAY 1 each 0    cetirizine (ZYRTEC) 10 MG tablet TAKE 1 TABLET BY MOUTH ONCE DAILY 90 tablet 3    chlorhexidine (PERIDEX) 0.12 % solution RINSE AND GARGLE 15 ML BY MOUTH OR THROAT TWICE DAILY AS DIRECTED 473 mL 1    hydrocortisone 1 % cream Apply twice a day 26 g 0    lancets (ONETOUCH DELICA PLUS LANCET) 30 gauge Misc USE AS DIRECTED 100 each 11    omeprazole (PRILOSEC) 20 MG capsule Take 1 capsule (20 mg total) by mouth once daily. 30 capsule 5    promethazine (PHENERGAN) 25 MG tablet Take 1 tablet (25 mg total) by mouth every 6 (six) hours as needed for Nausea. Sedation warning 15 tablet 0    RESTASIS 0.05 % ophthalmic emulsion Place 1 drop into both eyes 2 (two) times daily. 60 each 2    TRUE METRIX GLUCOSE TEST STRIP Strp USE TO TEST TWICE DAILY. 100 strip 11    TRUEPLUS LANCETS 33 gauge Misc Apply topically 2 (two) times daily.      cyanocobalamin, vitamin B-12, (NASCOBAL) 500 mcg/spray nasal spray INHALE 1 SPRAY INTO ONE NOSTRIL ONCE A WEEK. ALTERNATE NOSTRILS WEEKLY. AVOID HOT/SPICYFOOD FOR 1 HOUR PRIOR TO AND FOLLOWING SPRAY.      ergocalciferol (ERGOCALCIFEROL) 50,000 unit Cap Take 1 capsule (50,000 Units total) by mouth every 7 days. 12  capsule 3    olmesartan (BENICAR) 20 MG tablet Take 1 tablet (20 mg total) by mouth once daily. 90 tablet 1    olopatadine (PATANOL) 0.1 % ophthalmic solution INSTILL 1 DROP INTO THE LEFT EYE TWICE DAILY      semaglutide, weight loss, (WEGOVY) 0.5 mg/0.5 mL PnIj Inject 0.5 mg into the skin once a week. (Patient not taking: Reported on 3/17/2025) 2 mL 0    [START ON 3/19/2025] semaglutide, weight loss, (WEGOVY) 1 mg/0.5 mL PnIj Inject 1 mg into the skin once a week. (Patient not taking: Reported on 3/17/2025) 2 mL 0    [START ON 4/19/2025] semaglutide, weight loss, (WEGOVY) 1.7 mg/0.75 mL PnIj Inject 1.7 mg into the skin once a week. (Patient not taking: Reported on 3/17/2025) 3 mL 0    [START ON 5/20/2025] semaglutide, weight loss, (WEGOVY) 2.4 mg/0.75 mL PnIj Inject 2.4 mg into the skin once a week. (Patient not taking: Reported on 3/17/2025) 3 mL 0     No current facility-administered medications for this visit.

## 2025-03-19 LAB
SPECIMEN SOURCE: NORMAL
T VAGINALIS RRNA SPEC QL NAA+PROBE: NEGATIVE

## 2025-03-20 LAB — BACTERIA UR CULT: ABNORMAL

## 2025-04-04 ENCOUNTER — PATIENT MESSAGE (OUTPATIENT)
Dept: INTERNAL MEDICINE | Facility: CLINIC | Age: 55
End: 2025-04-04
Payer: MEDICAID

## 2025-04-04 DIAGNOSIS — E55.9 VITAMIN D DEFICIENCY: ICD-10-CM

## 2025-04-04 NOTE — TELEPHONE ENCOUNTER
Care Due:                  Date            Visit Type   Department     Provider  --------------------------------------------------------------------------------                                EP -                              PRIMARY      [facility]  Last Visit: 03-      CARE ([facility])   MEDICINE       Tmo  Anjana                              EP -                              PRIMARY      [facility]  Next Visit: 09-      CARE ([facility])   MEDICINE       [facility]  Anjana                                                            Last  Test          Frequency    Reason                     Performed    Due Date  --------------------------------------------------------------------------------    Vitamin D...  12 months..  ergocalciferol...........  Not Found    Overdue    [employer] Embedded Care Due Messages. Reference number: [id number].   4/04/2025 2:56:55 PM CDT

## 2025-04-05 RX ORDER — ERGOCALCIFEROL 1.25 MG/1
50000 CAPSULE ORAL
Qty: 12 CAPSULE | Refills: 3 | Status: SHIPPED | OUTPATIENT
Start: 2025-04-05 | End: 2026-04-05

## 2025-05-06 DIAGNOSIS — K05.10 GINGIVITIS: ICD-10-CM

## 2025-05-06 RX ORDER — CHLORHEXIDINE GLUCONATE ORAL RINSE 1.2 MG/ML
SOLUTION DENTAL
Qty: 473 ML | Refills: 1 | Status: SHIPPED | OUTPATIENT
Start: 2025-05-06

## 2025-05-06 NOTE — TELEPHONE ENCOUNTER
Refill Routing Note   Medication(s) are not appropriate for processing by [facility] for the following reason(s):        Outside of protocol    [facility] action(s):  Route               Appointments  past 12m or future 3m with PCP    Date Provider   Last Visit   3/17/2025 Tom Yeung MD   Next Visit   9/17/2025 Tom Yeung MD   ED visits in past 90 days: 1        Note composed:10:17 AM 05/06/2025

## 2025-05-18 DIAGNOSIS — H10.12 ACUTE ATOPIC CONJUNCTIVITIS OF LEFT EYE: ICD-10-CM

## 2025-05-19 RX ORDER — AZELASTINE HYDROCHLORIDE 0.5 MG/ML
1 SOLUTION/ DROPS OPHTHALMIC 2 TIMES DAILY
Qty: 6 ML | Refills: 6 | Status: SHIPPED | OUTPATIENT
Start: 2025-05-19

## 2025-06-02 ENCOUNTER — PATIENT OUTREACH (OUTPATIENT)
Dept: ADMINISTRATIVE | Facility: HOSPITAL | Age: 55
End: 2025-06-02
Payer: MEDICAID

## 2025-07-23 DIAGNOSIS — E11.9 TYPE 2 DIABETES MELLITUS WITHOUT COMPLICATION: ICD-10-CM

## 2025-07-30 DIAGNOSIS — E11.9 TYPE 2 DIABETES MELLITUS WITHOUT COMPLICATION: ICD-10-CM

## 2025-08-04 ENCOUNTER — TELEPHONE (OUTPATIENT)
Dept: OBSTETRICS AND GYNECOLOGY | Facility: CLINIC | Age: 55
End: 2025-08-04
Payer: MEDICAID

## 2025-08-04 ENCOUNTER — OFFICE VISIT (OUTPATIENT)
Dept: OBSTETRICS AND GYNECOLOGY | Facility: CLINIC | Age: 55
End: 2025-08-04
Payer: MEDICAID

## 2025-08-04 VITALS
BODY MASS INDEX: 30.74 KG/M2 | HEIGHT: 68 IN | DIASTOLIC BLOOD PRESSURE: 76 MMHG | WEIGHT: 202.81 LBS | SYSTOLIC BLOOD PRESSURE: 116 MMHG

## 2025-08-04 DIAGNOSIS — Z01.419 ENCOUNTER FOR GYNECOLOGICAL EXAMINATION WITHOUT ABNORMAL FINDING: Primary | ICD-10-CM

## 2025-08-04 DIAGNOSIS — Z12.31 ENCOUNTER FOR SCREENING MAMMOGRAM FOR BREAST CANCER: ICD-10-CM

## 2025-08-04 PROCEDURE — 3074F SYST BP LT 130 MM HG: CPT | Mod: CPTII,,, | Performed by: NURSE PRACTITIONER

## 2025-08-04 PROCEDURE — 88175 CYTOPATH C/V AUTO FLUID REDO: CPT | Mod: TC | Performed by: NURSE PRACTITIONER

## 2025-08-04 PROCEDURE — 99386 PREV VISIT NEW AGE 40-64: CPT | Mod: S$PBB,,, | Performed by: NURSE PRACTITIONER

## 2025-08-04 PROCEDURE — 3008F BODY MASS INDEX DOCD: CPT | Mod: CPTII,,, | Performed by: NURSE PRACTITIONER

## 2025-08-04 PROCEDURE — 99999 PR PBB SHADOW E&M-EST. PATIENT-LVL IV: CPT | Mod: PBBFAC,,, | Performed by: NURSE PRACTITIONER

## 2025-08-04 PROCEDURE — 99214 OFFICE O/P EST MOD 30 MIN: CPT | Mod: PBBFAC | Performed by: NURSE PRACTITIONER

## 2025-08-04 PROCEDURE — 4010F ACE/ARB THERAPY RXD/TAKEN: CPT | Mod: CPTII,,, | Performed by: NURSE PRACTITIONER

## 2025-08-04 PROCEDURE — 3078F DIAST BP <80 MM HG: CPT | Mod: CPTII,,, | Performed by: NURSE PRACTITIONER

## 2025-08-04 PROCEDURE — 3044F HG A1C LEVEL LT 7.0%: CPT | Mod: CPTII,,, | Performed by: NURSE PRACTITIONER

## 2025-08-04 PROCEDURE — 1160F RVW MEDS BY RX/DR IN RCRD: CPT | Mod: CPTII,,, | Performed by: NURSE PRACTITIONER

## 2025-08-04 PROCEDURE — 1159F MED LIST DOCD IN RCRD: CPT | Mod: CPTII,,, | Performed by: NURSE PRACTITIONER

## 2025-08-04 RX ORDER — TERBINAFINE HYDROCHLORIDE 250 MG/1
250 TABLET ORAL
COMMUNITY
Start: 2025-06-24

## 2025-08-04 NOTE — PROGRESS NOTES
"CC: Well woman exam    Marcie Geiger is a 54 y.o. female  presents for well woman exam.  LMP: No LMP recorded (lmp unknown). Patient is postmenopausal..      Health Maintenance Topics with due status: Not Due       Topic Last Completion Date    TETANUS VACCINE 2015    Cervical Cancer Screening 2022    Diabetes Urine Screening 2024    Colorectal Cancer Screening 2024    Influenza Vaccine 2024    Low Dose Statin 2025    RSV Vaccine (Age 60+ and Pregnant patients) Not Due     Past Medical History:   Diagnosis Date    Bilateral cataracts     Diabetes mellitus     Diabetes mellitus, type 2     GERD (gastroesophageal reflux disease)     HTN (hypertension)     Hyperlipidemia      Past Surgical History:   Procedure Laterality Date    bariatic surgery       Social History[1]  Family History   Problem Relation Name Age of Onset    Diabetes Mother      Heart disease Mother      Heart attack Father      Heart disease Father      Diabetes Brother      Hypertension Brother      Breast cancer Paternal Aunt      Colon cancer Neg Hx      Ovarian cancer Neg Hx       OB History          0    Para   0    Term   0       0    AB   0    Living   0         SAB   0    IAB   0    Ectopic   0    Multiple   0    Live Births   0                 /76   Ht 5' 8" (1.727 m)   Wt 92 kg (202 lb 13.2 oz)   LMP  (LMP Unknown)   BMI 30.84 kg/m²       ROS:  Per hpi    PHYSICAL EXAM:  APPEARANCE: Well nourished, well developed, in no acute distress.  AFFECT: WNL, alert and oriented x 3  SKIN: No acne or hirsutism  NECK: Neck symmetric without masses or thyromegaly  NODES: No inguinal, cervical, axillary, or femoral lymph node enlargement  CHEST: Good respiratory effect  ABDOMEN: Soft.  No tenderness or masses.  No hepatosplenomegaly.  No hernias.  BREASTS: Symmetrical, no skin changes or visible lesions.  No palpable masses, nipple discharge bilaterally.  PELVIC: Normal external " genitalia without lesions.  Normal hair distribution.  Adequate perineal body, normal urethral meatus.  Vagina moist and well rugated without lesions or discharge.  Cervix pink, without lesions, discharge or tenderness.  No significant cystocele or rectocele.  Bimanual exam shows uterus to be normal size, regular, mobile and nontender.  Adnexa without masses or tenderness.    EXTREMITIES: No edema.  Physical Exam    1. Encounter for gynecological examination without abnormal finding  Liquid-Based Pap Smear, Screening      2. Encounter for screening mammogram for breast cancer  Mammo Digital Screening Bilat w/ Richy (XPD)       AND PLAN:    Marcie was seen today for well woman.    Diagnoses and all orders for this visit:    Encounter for gynecological examination without abnormal finding  -     Liquid-Based Pap Smear, Screening    Encounter for screening mammogram for breast cancer  -     Mammo Digital Screening Bilat w/ Richy (XPD); Future         Patient was counseled today on A.C.S. Pap guidelines and recommendations for yearly pelvic exams, mammograms and monthly self breast exams; to see her PCP for other health maintenance.                          [1]   Social History  Socioeconomic History    Marital status: Single   Tobacco Use    Smoking status: Former     Current packs/day: 0.00     Average packs/day: 0.5 packs/day for 2.0 years (1.0 ttl pk-yrs)     Types: Cigarettes     Start date:      Quit date: 2000     Years since quittin.6    Smokeless tobacco: Never   Substance and Sexual Activity    Alcohol use: Yes     Comment: socially    Drug use: No    Sexual activity: Yes     Partners: Male     Birth control/protection: Condom     Social Drivers of Health     Financial Resource Strain: Low Risk  (2025)    Overall Financial Resource Strain (CARDIA)     Difficulty of Paying Living Expenses: Not hard at all   Food Insecurity: No Food Insecurity (2025)    Hunger Vital Sign     Worried About  Running Out of Food in the Last Year: Never true     Ran Out of Food in the Last Year: Never true   Transportation Needs: No Transportation Needs (8/4/2025)    PRAPARE - Transportation     Lack of Transportation (Medical): No     Lack of Transportation (Non-Medical): No   Physical Activity: Insufficiently Active (8/4/2025)    Exercise Vital Sign     Days of Exercise per Week: 4 days     Minutes of Exercise per Session: 30 min   Stress: No Stress Concern Present (8/4/2025)    [employer] - Occupational Stress Questionnaire     Feeling of Stress : Not at all   Housing Stability: Low Risk  (8/4/2025)    Housing Stability Vital Sign     Unable to Pay for Housing in the Last Year: No     Number of Times Moved in the Last Year: 0     Homeless in the Last Year: No

## 2025-08-04 NOTE — TELEPHONE ENCOUNTER
Copied from CRM #[id number]. Topic: Appointments - Appointment Access  >> Aug 4, 2025  8:24 AM Viktoria wrote:  Type:  Patient Requesting a call back     Who Called:Marcie   What is the call back request regarding?:*pt stuck in traffic, ETA:10AM*IOS  Would the patient rather a call back or a response via [facility]?call  Best Call Back Number:[phone]    Additional Information:

## 2025-08-04 NOTE — TELEPHONE ENCOUNTER
Spoke with pt regarding late appointment arrival on 8/4/2025.     Pt is able to be seen when she arrives per Sandra.

## 2025-08-08 LAB
INSULIN SERPL-ACNC: NORMAL U[IU]/ML
LAB AP BETHESDA CATEGORY: NORMAL
LAB AP CLINICAL FINDINGS: NORMAL
LAB AP CONTRACEPTIVES: NORMAL
LAB AP LMP DATE: NORMAL
LAB AP OCHS PAP SPECIMEN ADEQUACY: NORMAL
LAB AP OHS PAP INTERPRETATION: NORMAL
LAB AP PAP DISCLAIMER COMMENTS: NORMAL
LAB AP PAP ESTROGEN REPLACEMENT THERAPY: NORMAL
LAB AP PAP PMP: NORMAL
LAB AP PAP PREVIOUS BX: NORMAL
LAB AP PAP PRIOR TREATMENT: NORMAL
LAB AP PERFORMING LOCATION(S): NORMAL

## 2025-08-19 DIAGNOSIS — K05.10 GINGIVITIS: ICD-10-CM

## 2025-08-19 RX ORDER — CHLORHEXIDINE GLUCONATE ORAL RINSE 1.2 MG/ML
SOLUTION DENTAL
Qty: 473 ML | Refills: 1 | Status: SHIPPED | OUTPATIENT
Start: 2025-08-19

## 2025-08-20 ENCOUNTER — TELEPHONE (OUTPATIENT)
Dept: INTERNAL MEDICINE | Facility: CLINIC | Age: 55
End: 2025-08-20
Payer: MEDICAID

## 2025-08-21 ENCOUNTER — OFFICE VISIT (OUTPATIENT)
Dept: INTERNAL MEDICINE | Facility: CLINIC | Age: 55
End: 2025-08-21
Payer: MEDICAID

## 2025-08-21 VITALS
SYSTOLIC BLOOD PRESSURE: 126 MMHG | OXYGEN SATURATION: 97 % | DIASTOLIC BLOOD PRESSURE: 82 MMHG | HEIGHT: 68 IN | RESPIRATION RATE: 16 BRPM | BODY MASS INDEX: 31.85 KG/M2 | HEART RATE: 100 BPM | TEMPERATURE: 99 F | WEIGHT: 210.13 LBS

## 2025-08-21 DIAGNOSIS — E78.49 OTHER HYPERLIPIDEMIA: ICD-10-CM

## 2025-08-21 DIAGNOSIS — L21.9 SEBORRHEIC DERMATITIS: ICD-10-CM

## 2025-08-21 DIAGNOSIS — I10 ESSENTIAL HYPERTENSION: Primary | ICD-10-CM

## 2025-08-21 DIAGNOSIS — E11.9 TYPE 2 DIABETES MELLITUS WITHOUT COMPLICATION, WITHOUT LONG-TERM CURRENT USE OF INSULIN: ICD-10-CM

## 2025-08-21 DIAGNOSIS — Z90.3 H/O LAPAROSCOPIC PARTIAL GASTRECTOMY: ICD-10-CM

## 2025-08-21 DIAGNOSIS — B35.1 ONYCHOMYCOSIS: ICD-10-CM

## 2025-08-21 DIAGNOSIS — E66.09 CLASS 1 OBESITY DUE TO EXCESS CALORIES WITH SERIOUS COMORBIDITY AND BODY MASS INDEX (BMI) OF 34.0 TO 34.9 IN ADULT: ICD-10-CM

## 2025-08-21 DIAGNOSIS — E66.811 CLASS 1 OBESITY DUE TO EXCESS CALORIES WITH SERIOUS COMORBIDITY AND BODY MASS INDEX (BMI) OF 34.0 TO 34.9 IN ADULT: ICD-10-CM

## 2025-08-21 DIAGNOSIS — K21.9 GASTROESOPHAGEAL REFLUX DISEASE WITHOUT ESOPHAGITIS: ICD-10-CM

## 2025-08-21 PROCEDURE — G2211 COMPLEX E/M VISIT ADD ON: HCPCS | Mod: ,,, | Performed by: STUDENT IN AN ORGANIZED HEALTH CARE EDUCATION/TRAINING PROGRAM

## 2025-08-21 PROCEDURE — 4010F ACE/ARB THERAPY RXD/TAKEN: CPT | Mod: CPTII,,, | Performed by: STUDENT IN AN ORGANIZED HEALTH CARE EDUCATION/TRAINING PROGRAM

## 2025-08-21 PROCEDURE — 1159F MED LIST DOCD IN RCRD: CPT | Mod: CPTII,,, | Performed by: STUDENT IN AN ORGANIZED HEALTH CARE EDUCATION/TRAINING PROGRAM

## 2025-08-21 PROCEDURE — 3079F DIAST BP 80-89 MM HG: CPT | Mod: CPTII,,, | Performed by: STUDENT IN AN ORGANIZED HEALTH CARE EDUCATION/TRAINING PROGRAM

## 2025-08-21 PROCEDURE — 3074F SYST BP LT 130 MM HG: CPT | Mod: CPTII,,, | Performed by: STUDENT IN AN ORGANIZED HEALTH CARE EDUCATION/TRAINING PROGRAM

## 2025-08-21 PROCEDURE — 99999 PR PBB SHADOW E&M-EST. PATIENT-LVL IV: CPT | Mod: PBBFAC,,, | Performed by: STUDENT IN AN ORGANIZED HEALTH CARE EDUCATION/TRAINING PROGRAM

## 2025-08-21 PROCEDURE — 99215 OFFICE O/P EST HI 40 MIN: CPT | Mod: S$PBB,,, | Performed by: STUDENT IN AN ORGANIZED HEALTH CARE EDUCATION/TRAINING PROGRAM

## 2025-08-21 PROCEDURE — 3008F BODY MASS INDEX DOCD: CPT | Mod: CPTII,,, | Performed by: STUDENT IN AN ORGANIZED HEALTH CARE EDUCATION/TRAINING PROGRAM

## 2025-08-21 PROCEDURE — 99214 OFFICE O/P EST MOD 30 MIN: CPT | Mod: PBBFAC,PO | Performed by: STUDENT IN AN ORGANIZED HEALTH CARE EDUCATION/TRAINING PROGRAM

## 2025-08-21 PROCEDURE — 3044F HG A1C LEVEL LT 7.0%: CPT | Mod: CPTII,,, | Performed by: STUDENT IN AN ORGANIZED HEALTH CARE EDUCATION/TRAINING PROGRAM

## 2025-08-21 RX ORDER — KETOCONAZOLE 20 MG/G
CREAM TOPICAL DAILY
Qty: 60 G | Refills: 0 | Status: SHIPPED | OUTPATIENT
Start: 2025-08-21 | End: 2025-11-19

## 2025-08-21 RX ORDER — TIRZEPATIDE 2.5 MG/.5ML
2.5 INJECTION, SOLUTION SUBCUTANEOUS
Qty: 2 ML | Refills: 0 | Status: SHIPPED | OUTPATIENT
Start: 2025-08-21

## 2025-08-21 RX ORDER — SEMAGLUTIDE 0.5 MG/.5ML
0.5 INJECTION, SOLUTION SUBCUTANEOUS
Qty: 2 ML | Refills: 0 | Status: SHIPPED | OUTPATIENT
Start: 2025-08-21

## 2025-08-21 RX ORDER — KETOCONAZOLE 20 MG/ML
SHAMPOO, SUSPENSION TOPICAL
Qty: 120 ML | Refills: 0 | Status: SHIPPED | OUTPATIENT
Start: 2025-08-21

## 2025-08-21 RX ORDER — ATORVASTATIN CALCIUM 20 MG/1
20 TABLET, FILM COATED ORAL DAILY
Qty: 90 TABLET | Refills: 0 | Status: SHIPPED | OUTPATIENT
Start: 2025-08-21